# Patient Record
Sex: MALE | Race: BLACK OR AFRICAN AMERICAN | NOT HISPANIC OR LATINO | Employment: OTHER | URBAN - METROPOLITAN AREA
[De-identification: names, ages, dates, MRNs, and addresses within clinical notes are randomized per-mention and may not be internally consistent; named-entity substitution may affect disease eponyms.]

---

## 2017-08-09 ENCOUNTER — OFFICE VISIT (OUTPATIENT)
Dept: FAMILY MEDICINE CLINIC | Facility: CLINIC | Age: 62
End: 2017-08-09

## 2017-08-09 ENCOUNTER — TELEPHONE (OUTPATIENT)
Dept: FAMILY MEDICINE CLINIC | Facility: CLINIC | Age: 62
End: 2017-08-09

## 2017-08-09 VITALS
HEART RATE: 110 BPM | BODY MASS INDEX: 25.41 KG/M2 | SYSTOLIC BLOOD PRESSURE: 122 MMHG | TEMPERATURE: 98 F | OXYGEN SATURATION: 90 % | DIASTOLIC BLOOD PRESSURE: 78 MMHG | HEIGHT: 63 IN | WEIGHT: 143.4 LBS

## 2017-08-09 DIAGNOSIS — M54.50 CHRONIC BILATERAL LOW BACK PAIN WITHOUT SCIATICA: ICD-10-CM

## 2017-08-09 DIAGNOSIS — G89.29 CHRONIC NECK PAIN: ICD-10-CM

## 2017-08-09 DIAGNOSIS — N40.0 BENIGN NON-NODULAR PROSTATIC HYPERPLASIA WITHOUT LOWER URINARY TRACT SYMPTOMS: ICD-10-CM

## 2017-08-09 DIAGNOSIS — K27.9 PUD (PEPTIC ULCER DISEASE): ICD-10-CM

## 2017-08-09 DIAGNOSIS — G89.29 CHRONIC BILATERAL LOW BACK PAIN WITHOUT SCIATICA: ICD-10-CM

## 2017-08-09 DIAGNOSIS — L29.8 ITCHING DUE TO DRUG: Primary | ICD-10-CM

## 2017-08-09 DIAGNOSIS — M54.2 CHRONIC NECK PAIN: ICD-10-CM

## 2017-08-09 DIAGNOSIS — T50.905A ITCHING DUE TO DRUG: Primary | ICD-10-CM

## 2017-08-09 PROCEDURE — 96372 THER/PROPH/DIAG INJ SC/IM: CPT | Performed by: NURSE PRACTITIONER

## 2017-08-09 PROCEDURE — 99213 OFFICE O/P EST LOW 20 MIN: CPT | Performed by: NURSE PRACTITIONER

## 2017-08-09 RX ORDER — OMEPRAZOLE 40 MG/1
40 CAPSULE, DELAYED RELEASE ORAL DAILY
COMMUNITY
End: 2017-09-18 | Stop reason: SDUPTHER

## 2017-08-09 RX ORDER — LORATADINE 10 MG/1
10 TABLET ORAL EVERY MORNING
Qty: 30 TABLET | Refills: 0 | Status: SHIPPED | OUTPATIENT
Start: 2017-08-09 | End: 2018-01-01 | Stop reason: HOSPADM

## 2017-08-09 RX ORDER — ASPIRIN 81 MG/1
81 TABLET ORAL DAILY
COMMUNITY
End: 2018-09-05

## 2017-08-09 RX ORDER — SACCHAROMYCES BOULARDII 250 MG
250 CAPSULE ORAL 2 TIMES DAILY
Qty: 60 CAPSULE | Refills: 1 | Status: SHIPPED | OUTPATIENT
Start: 2017-08-09 | End: 2018-02-08 | Stop reason: HOSPADM

## 2017-08-09 RX ORDER — DIPHENHYDRAMINE HCL 25 MG
25 CAPSULE ORAL EVERY 6 HOURS PRN
Qty: 30 CAPSULE | Refills: 0 | Status: SHIPPED | OUTPATIENT
Start: 2017-08-09 | End: 2018-09-05

## 2017-08-09 RX ORDER — TAMSULOSIN HYDROCHLORIDE 0.4 MG/1
1 CAPSULE ORAL NIGHTLY
COMMUNITY
End: 2017-10-16 | Stop reason: SDUPTHER

## 2017-08-09 RX ORDER — METHYLPREDNISOLONE ACETATE 80 MG/ML
80 INJECTION, SUSPENSION INTRA-ARTICULAR; INTRALESIONAL; INTRAMUSCULAR; SOFT TISSUE ONCE
Status: COMPLETED | OUTPATIENT
Start: 2017-08-09 | End: 2017-08-09

## 2017-08-09 RX ORDER — SULFAMETHOXAZOLE AND TRIMETHOPRIM 800; 160 MG/1; MG/1
1 TABLET ORAL 2 TIMES DAILY
COMMUNITY
End: 2017-08-29

## 2017-08-09 RX ADMIN — METHYLPREDNISOLONE ACETATE 80 MG: 80 INJECTION, SUSPENSION INTRA-ARTICULAR; INTRALESIONAL; INTRAMUSCULAR; SOFT TISSUE at 14:50

## 2017-08-09 NOTE — TELEPHONE ENCOUNTER
MED IS BENADRYL, TAKE 1 CAP EVERY 6 HRS AND ALSO SAYS EVERY DAY AT BED TIME. WHICH DIRECTION IS IT?

## 2017-08-09 NOTE — PATIENT INSTRUCTIONS
Drug Allergy  A drug allergy is when the body's disease-fighting system (immune system) reacts badly to a medicine. Drug allergies range from mild to severe, and they can be life-threatening in some cases.  Some allergic reactions occur one week or more after you are exposed to a medicine (delayed reaction). A sudden (acute), severe allergic reaction that affects multiple areas of the body is called an anaphylactic reaction (anaphylaxis). Anaphylaxis can be life-threatening. All allergic reactions to a medicine require medical evaluation, even if an allergic reaction appears to be mild (minor).  CAUSES  Drug allergies happen when the immune system wrongly identifies a medicine as being harmful. When this happens, the body releases proteins (antibodies) and other compounds, such as histamine, into the bloodstream. This causes swelling in certain tissues and loss of blood pressure to important areas, such as the heart and lungs.  Almost any medicine can cause an allergic reaction. Medicines that commonly cause allergic reactions (are common allergens) include:  · Penicillin.  · Sulfa medicines (sulfonamides).  · Medicines that numb certain areas of the body (local anesthetics).  · X-ray dyes that contain iodine.  SYMPTOMS  Mild Allergic Reaction  · Nasal congestion.  · Tingling in the mouth.  · An itchy, red rash.  Severe Allergic Reaction  · Swelling of the eyes, lips, face, or tongue.  · Swelling of the back of the mouth and the throat.  · Wheezing.  · A hoarse voice.  · Itchy, red, swollen areas of skin (hives).  · Dizziness or light-headedness.  · Fainting.  · Anxiety or confusion.  · Abdominal pain.  · Difficulty breathing, speaking, or swallowing.  · Chest tightness.  · Fast or irregular heartbeats (palpitations).  · Vomiting.  · Diarrhea.  DIAGNOSIS  This condition is diagnosed from a physical exam and your history of recent exposure to one or more medicines. You may be referred for follow-up testing by a  "health care provider who specializes in allergies. This testing can confirm the diagnosis of a drug allergy and determine which medicines you are allergic to. Testing may include:  · Skin tests. These may involve:    Injecting a small amount of the possible allergen between layers of your skin (intradermal injection).    Applying patches to your skin.  · Blood tests.  · Drug challenge. For this test, a health care provider gives you a small amount of a medicine in gradual doses while watching for an allergic reaction.  If you are unsure of what caused your allergic reaction, your health care provider may ask you for:  · Information about all medicines that you take on a regular basis, including:    The name of each medicine.    How much (dosage) and how many times you take each medicine per day.    The form of each medicine, such as pill, liquid, or injection.  · The date and time of your reaction.  TREATMENT  There is no cure for allergies. However, an allergic reaction can be treated with:  · Medicines that help:    To reduce pain and swelling (NSAIDs).    To relieve itching and hives (antihistamines).    To reduce swelling (corticosteroids).  · Respiratory inhalers. These are inhaled medicines that help to widen (dilate) the airways in your lungs.  · Injections of medicine that helps to relax the muscles in your airways and tighten your blood vessels (epinephrine).  Severe allergic reactions, such as anaphylaxis, require immediate treatment in a hospital. If you have an anaphylactic reaction, you may need to be hospitalized for observation. You may be prescribed rescue medicines, such as epinephrine. Epinephrine comes in many forms, including what is commonly called an auto-injector \"pen\" (pre-filled automatic epinephrine injection device).   HOME CARE INSTRUCTIONS  If You Have a Severe Allergy  · Always keep an auto-injector pen or your anaphylaxis kit near you. These can be lifesaving if you have a severe " reaction. Use your auto-injector pen or anaphylaxis kit as told by your health care provider.  · Make sure that you, the members of your household, and your employer know:    How to use an anaphylaxis kit.    How to use an auto-injector pen to give you an epinephrine injection.  · Replace your epinephrine immediately after you use your auto-injector pen, in case you have another reaction.  · Wear a medical alert bracelet or necklace that states your drug allergy, if told by your health care provider.  General Instructions  · Avoid medicines that you are allergic to.  · Take over-the-counter and prescription medicines only as told by your health care provider.  · If you were given medicines to treat your reaction, do not drive until your health care provider approves.  · If you have hives or a rash:    Use an over-the-counter antihistamine as told by your health care provider.    Apply cold, wet cloths (cold compresses) to your skin or take baths or showers in cool water. Avoid hot water.  · If you had tests done, it is your responsibility to get your test results. Ask your health care provider when your results will be ready.  · Tell any health care providers who care for you that you have a drug allergy.  · Keep all follow-up visits as told by your health care provider. This is important.  SEEK MEDICAL CARE IF:  · You think that you are having an allergic reaction. Symptoms of an allergic reaction usually start within 30 minutes after you are exposed to a medicine.  · You have symptoms that last more than 2 days after your reaction.  · Your symptoms get worse.  · You develop new symptoms.  SEEK IMMEDIATE MEDICAL CARE IF:  · You needed to use epinephrine.    An epinephrine injection helps to manage life-threatening allergic reactions, but you still need to go to the emergency room even if epinephrine seems to work. This is important because anaphylaxis may happen again within 72 hours (rebound anaphylaxis).    If  you used epinephrine to treat anaphylaxis outside of the hospital, you need additional medical care. This may include more doses of epinephrine.  · You develop symptoms of a severe allergic reaction.  These symptoms may represent a serious problem that is an emergency. Do not wait to see if the symptoms will go away. Use your auto-injector pen or anaphylaxis kit as you have been instructed, and get medical help right away. Call your local emergency services (911 in the [place] Do not drive yourself to the hospital.     This information is not intended to replace advice given to you by your health care provider. Make sure you discuss any questions you have with your health care provider.     Document Released: 12/18/2006 Document Revised: 04/10/2017 Document Reviewed: 07/19/2016  [employer] Interactive Patient Education ©2017 [employer]

## 2017-08-09 NOTE — PROGRESS NOTES
Chief Complaint   Patient presents with   • Facial Swelling       Subjective   Sadi Stone is a 62 y.o. male    Facial Swelling   This is a new problem. The current episode started yesterday (yesterday started with swelling and itching in right hand, bilat feet, and now with swelling of lips and  genalized itching, ). The problem occurs constantly. The problem has been gradually worsening. Associated symptoms include a change in bowel habit (constipation since on antibiotics) and fatigue. Pertinent negatives include no fever, joint swelling, myalgias, numbness, rash (with itching), vomiting or weakness. Exacerbated by: took Cipro for 3 wks, then switched to Bactrim  DS for 3 wks, Stopped yesterday, and then itching developed. Treatments tried: Benadryl last night and this AM, not help. The treatment provided no relief.       The following portions of the patient's history were reviewed and updated as appropriate: allergies, current medications, past social history and problem list    No Known Allergies  Past Medical History:   Diagnosis Date   • Arthritis    • Glaucoma    • Peptic ulceration       Past Surgical History:   Procedure Laterality Date   • KIDNEY SURGERY Left     removed a spot    • KNEE SURGERY     • SHOULDER SURGERY       Social History     Social History   • Marital status:      Spouse name: N/A   • Number of children: N/A   • Years of education: N/A     Occupational History   • Not on file.     Social History Main Topics   • Smoking status: Never Smoker   • Smokeless tobacco: Never Used   • Alcohol use No   • Drug use: No   • Sexual activity: Not Currently     Other Topics Concern   • Not on file     Social History Narrative   • No narrative on file        Current Outpatient Prescriptions:   •  aspirin 81 MG EC tablet, Take 81 mg by mouth Daily., Disp: , Rfl:   •  omeprazole (priLOSEC) 40 MG capsule, Take 40 mg by mouth Daily., Disp: , Rfl:   •  Prenatal Vit-Fe Fumarate-FA (M-VIT) tablet,  Take 1 tablet by mouth Daily., Disp: , Rfl:   •  sulfamethoxazole-trimethoprim (BACTRIM DS,SEPTRA DS) 800-160 MG per tablet, Take 1 tablet by mouth 2 (Two) Times a Day., Disp: , Rfl:   •  tamsulosin (FLOMAX) 0.4 MG capsule 24 hr capsule, Take 1 capsule by mouth Every Night., Disp: , Rfl:   •  diphenhydrAMINE (BENADRYL) 25 mg capsule, Take 1 capsule by mouth Every 6 (Six) Hours As Needed for Itching (qhs for itching)., Disp: 30 capsule, Rfl: 0  •  loratadine (CLARITIN) 10 MG tablet, Take 1 tablet by mouth Every Morning., Disp: 30 tablet, Rfl: 0  •  saccharomyces boulardii (FLORASTOR) 250 MG capsule, Take 1 capsule by mouth 2 (Two) Times a Day., Disp: 60 capsule, Rfl: 1    Current Facility-Administered Medications:   •  methylPREDNISolone acetate (DEPO-medrol) injection 80 mg, 80 mg, Intramuscular, Once, BENI Hernandez     Review of Systems   Constitutional: Positive for fatigue. Negative for fever.   HENT: Positive for facial swelling.    Gastrointestinal: Positive for change in bowel habit (constipation since on antibiotics). Negative for vomiting.   Musculoskeletal: Negative for joint swelling and myalgias.   Skin: Negative for rash (with itching).   Neurological: Negative for weakness and numbness.       Objective     Vitals:    08/09/17 1303   BP: 122/78   Pulse: 110   Temp: 98 °F (36.7 °C)   SpO2: 90%       No results found for this or any previous visit.    Physical Exam   Constitutional: He is oriented to person, place, and time. He appears well-developed and well-nourished.   Cardiovascular: Normal rate, regular rhythm and normal heart sounds.    Pulmonary/Chest: Effort normal and breath sounds normal.   Neurological: He is alert and oriented to person, place, and time.   Skin: Skin is warm and dry. Rash (red raised areas on arms and legs, patch like, ) noted.   Psychiatric: He has a normal mood and affect. His behavior is normal.   Vitals reviewed.      Assessment/Plan   Problem List Items  Addressed This Visit        Digestive    PUD (peptic ulcer disease)    Relevant Medications    omeprazole (priLOSEC) 40 MG capsule       Nervous and Auditory    Itching due to drug - Primary    Relevant Medications    diphenhydrAMINE (BENADRYL) 25 mg capsule    loratadine (CLARITIN) 10 MG tablet    saccharomyces boulardii (FLORASTOR) 250 MG capsule    methylPREDNISolone acetate (DEPO-medrol) injection 80 mg (Start on 8/9/2017  2:45 PM)    Chronic neck pain       Genitourinary    Benign non-nodular prostatic hyperplasia without lower urinary tract symptoms       Other    Chronic bilateral low back pain without sciatica      stop Bactrim.     Counseling provided on diet and nutrition.

## 2017-08-29 ENCOUNTER — OFFICE VISIT (OUTPATIENT)
Dept: FAMILY MEDICINE CLINIC | Facility: CLINIC | Age: 62
End: 2017-08-29

## 2017-08-29 ENCOUNTER — LAB (OUTPATIENT)
Dept: LAB | Facility: HOSPITAL | Age: 62
End: 2017-08-29

## 2017-08-29 VITALS
HEART RATE: 60 BPM | WEIGHT: 148 LBS | HEIGHT: 63 IN | BODY MASS INDEX: 26.22 KG/M2 | TEMPERATURE: 97.2 F | OXYGEN SATURATION: 99 % | SYSTOLIC BLOOD PRESSURE: 130 MMHG | DIASTOLIC BLOOD PRESSURE: 84 MMHG

## 2017-08-29 DIAGNOSIS — Z00.00 WELLNESS EXAMINATION: Primary | ICD-10-CM

## 2017-08-29 DIAGNOSIS — Z02.4 ENCOUNTER FOR DEPARTMENT OF TRANSPORTATION (DOT) EXAMINATION FOR DRIVING LICENSE RENEWAL: ICD-10-CM

## 2017-08-29 DIAGNOSIS — N40.0 BENIGN NON-NODULAR PROSTATIC HYPERPLASIA WITHOUT LOWER URINARY TRACT SYMPTOMS: ICD-10-CM

## 2017-08-29 DIAGNOSIS — Z00.00 WELLNESS EXAMINATION: ICD-10-CM

## 2017-08-29 DIAGNOSIS — G47.33 OSA (OBSTRUCTIVE SLEEP APNEA): ICD-10-CM

## 2017-08-29 DIAGNOSIS — M54.50 CHRONIC BILATERAL LOW BACK PAIN WITHOUT SCIATICA: ICD-10-CM

## 2017-08-29 DIAGNOSIS — G89.29 CHRONIC BILATERAL LOW BACK PAIN WITHOUT SCIATICA: ICD-10-CM

## 2017-08-29 LAB
25(OH)D3 SERPL-MCNC: 21.7 NG/ML
ALBUMIN SERPL-MCNC: 3.7 G/DL (ref 3.2–4.8)
ALBUMIN/GLOB SERPL: 1 G/DL (ref 1.5–2.5)
ALP SERPL-CCNC: 103 U/L (ref 25–100)
ALT SERPL W P-5'-P-CCNC: 23 U/L (ref 7–40)
ANION GAP SERPL CALCULATED.3IONS-SCNC: 3 MMOL/L (ref 3–11)
ARTICHOKE IGE QN: 30 MG/DL (ref 0–130)
AST SERPL-CCNC: 36 U/L (ref 0–33)
BILIRUB BLD-MCNC: NEGATIVE MG/DL
BILIRUB SERPL-MCNC: 1.2 MG/DL (ref 0.3–1.2)
BUN BLD-MCNC: 20 MG/DL (ref 9–23)
BUN/CREAT SERPL: 15.4 (ref 7–25)
CALCIUM SPEC-SCNC: 9.1 MG/DL (ref 8.7–10.4)
CHLORIDE SERPL-SCNC: 107 MMOL/L (ref 99–109)
CHOLEST SERPL-MCNC: 133 MG/DL (ref 0–200)
CLARITY, POC: CLEAR
CO2 SERPL-SCNC: 31 MMOL/L (ref 20–31)
COLOR UR: YELLOW
CREAT BLD-MCNC: 1.3 MG/DL (ref 0.6–1.3)
GFR SERPL CREATININE-BSD FRML MDRD: 67 ML/MIN/1.73
GLOBULIN UR ELPH-MCNC: 3.6 GM/DL
GLUCOSE BLD-MCNC: 86 MG/DL (ref 70–100)
GLUCOSE UR STRIP-MCNC: NEGATIVE MG/DL
HCV AB SER DONR QL: NORMAL
HDLC SERPL-MCNC: 70 MG/DL (ref 40–60)
KETONES UR QL: NEGATIVE
LEUKOCYTE EST, POC: NEGATIVE
NITRITE UR-MCNC: NEGATIVE MG/ML
PH UR: 6.5 [PH] (ref 5–8)
POTASSIUM BLD-SCNC: 4.5 MMOL/L (ref 3.5–5.5)
PROT SERPL-MCNC: 7.3 G/DL (ref 5.7–8.2)
PROT UR STRIP-MCNC: ABNORMAL MG/DL
PSA SERPL-MCNC: 4.41 NG/ML (ref 0–4)
RBC # UR STRIP: NEGATIVE /UL
SODIUM BLD-SCNC: 141 MMOL/L (ref 132–146)
SP GR UR: 1.02 (ref 1–1.03)
TRIGL SERPL-MCNC: 63 MG/DL (ref 0–150)
UROBILINOGEN UR QL: NORMAL

## 2017-08-29 PROCEDURE — 80061 LIPID PANEL: CPT | Performed by: NURSE PRACTITIONER

## 2017-08-29 PROCEDURE — 82306 VITAMIN D 25 HYDROXY: CPT | Performed by: NURSE PRACTITIONER

## 2017-08-29 PROCEDURE — 84153 ASSAY OF PSA TOTAL: CPT | Performed by: NURSE PRACTITIONER

## 2017-08-29 PROCEDURE — 81003 URINALYSIS AUTO W/O SCOPE: CPT | Performed by: NURSE PRACTITIONER

## 2017-08-29 PROCEDURE — 99396 PREV VISIT EST AGE 40-64: CPT | Performed by: NURSE PRACTITIONER

## 2017-08-29 PROCEDURE — 80053 COMPREHEN METABOLIC PANEL: CPT | Performed by: NURSE PRACTITIONER

## 2017-08-29 PROCEDURE — 36415 COLL VENOUS BLD VENIPUNCTURE: CPT

## 2017-08-29 PROCEDURE — 86803 HEPATITIS C AB TEST: CPT | Performed by: NURSE PRACTITIONER

## 2017-08-29 NOTE — PATIENT INSTRUCTIONS
Back Exercises  If you have pain in your back, do these exercises 2-3 times each day or as told by your doctor. When the pain goes away, do the exercises once each day, but repeat the steps more times for each exercise (do more repetitions). If you do not have pain in your back, do these exercises once each day or as told by your doctor.  EXERCISES  Single Knee to Chest  Do these steps 3-5 times in a row for each leg:  Lie on your back on a firm bed or the floor with your legs stretched out.  Bring one knee to your chest.  Hold your knee to your chest by grabbing your knee or thigh.  Pull on your knee until you feel a gentle stretch in your lower back.  Keep doing the stretch for 10-30 seconds.  Slowly let go of your leg and straighten it.  Pelvic Tilt  Do these steps 5-10 times in a row:  Lie on your back on a firm bed or the floor with your legs stretched out.  Bend your knees so they point up to the ceiling. Your feet should be flat on the floor.  Tighten your lower belly (abdomen) muscles to press your lower back against the floor. This will make your tailbone point up to the ceiling instead of pointing down to your feet or the floor.  Stay in this position for 5-10 seconds while you gently tighten your muscles and breathe evenly.  Cat-Cow  Do these steps until your lower back bends more easily:  Get on your hands and knees on a firm surface. Keep your hands under your shoulders, and keep your knees under your hips. You may put padding under your knees.  Let your head hang down, and make your tailbone point down to the floor so your lower back is round like the back of a cat.  Stay in this position for 5 seconds.  Slowly lift your head and make your tailbone point up to the ceiling so your back hangs low (sags) like the back of a cow.  Stay in this position for 5 seconds.  Press-Ups  Do these steps 5-10 times in a row:  Lie on your belly (face-down) on the floor.  Place your hands near your head, about  shoulder-width apart.  While you keep your back relaxed and keep your hips on the floor, slowly straighten your arms to raise the top half of your body and lift your shoulders. Do not use your back muscles. To make yourself more comfortable, you may change where you place your hands.  Stay in this position for 5 seconds.  Slowly return to lying flat on the floor.  Bridges  Do these steps 10 times in a row:  Lie on your back on a firm surface.  Bend your knees so they point up to the ceiling. Your feet should be flat on the floor.  Tighten your butt muscles and lift your butt off of the floor until your waist is almost as high as your knees. If you do not feel the muscles working in your butt and the back of your thighs, slide your feet 1-2 inches farther away from your butt.  Stay in this position for 3-5 seconds.  Slowly lower your butt to the floor, and let your butt muscles relax.  If this exercise is too easy, try doing it with your arms crossed over your chest.  Belly Crunches  Do these steps 5-10 times in a row:  Lie on your back on a firm bed or the floor with your legs stretched out.  Bend your knees so they point up to the ceiling. Your feet should be flat on the floor.  Cross your arms over your chest.  Tip your chin a little bit toward your chest but do not bend your neck.  Tighten your belly muscles and slowly raise your chest just enough to lift your shoulder blades a tiny bit off of the floor.  Slowly lower your chest and your head to the floor.  Back Lifts  Do these steps 5-10 times in a row:  Lie on your belly (face-down) with your arms at your sides, and rest your forehead on the floor.  Tighten the muscles in your legs and your butt.  Slowly lift your chest off of the floor while you keep your hips on the floor. Keep the back of your head in line with the curve in your back. Look at the floor while you do this.  Stay in this position for 3-5 seconds.  Slowly lower your chest and your face to the  floor.  GET HELP IF:  Your back pain gets a lot worse when you do an exercise.  Your back pain does not lessen 2 hours after you exercise.  If you have any of these problems, stop doing the exercises. Do not do them again unless your doctor says it is okay.  GET HELP RIGHT AWAY IF:  You have sudden, very bad back pain. If this happens, stop doing the exercises. Do not do them again unless your doctor says it is okay.     This information is not intended to replace advice given to you by your health care provider. Make sure you discuss any questions you have with your health care provider.     Document Released: 01/20/2012 Document Revised: 04/10/2017 Document Reviewed: 02/11/2016  [employer] Interactive Patient Education ©2017 [employer]  Back Pain, Adult  Back pain is very common in adults. The cause of back pain is rarely dangerous and the pain often gets better over time. The cause of your back pain may not be known. Some common causes of back pain include:  · Strain of the muscles or ligaments supporting the spine.  · Wear and tear (degeneration) of the spinal disks.  · Arthritis.  · Direct injury to the back.  For many people, back pain may return. Since back pain is rarely dangerous, most people can learn to manage this condition on their own.  HOME CARE INSTRUCTIONS  Watch your back pain for any changes. The following actions may help to lessen any discomfort you are feeling:  · Remain active. It is stressful on your back to sit or  one place for long periods of time. Do not sit, drive, or  one place for more than 30 minutes at a time. Take short walks on even surfaces as soon as you are able. Try to increase the length of time you walk each day.  · Exercise regularly as directed by your health care provider. Exercise helps your back heal faster. It also helps avoid future injury by keeping your muscles strong and flexible.  · Do not stay in bed. Resting more than 1-2 days can delay your  recovery.  · Pay attention to your body when you bend and lift. The most comfortable positions are those that put less stress on your recovering back. Always use proper lifting techniques, including:    Bending your knees.    Keeping the load close to your body.    Avoiding twisting.  · Find a comfortable position to sleep. Use a firm mattress and lie on your side with your knees slightly bent. If you lie on your back, put a pillow under your knees.  · Avoid feeling anxious or stressed. Stress increases muscle tension and can worsen back pain. It is important to recognize when you are anxious or stressed and learn ways to manage it, such as with exercise.  · Take medicines only as directed by your health care provider. Over-the-counter medicines to reduce pain and inflammation are often the most helpful. Your health care provider may prescribe muscle relaxant drugs. These medicines help dull your pain so you can more quickly return to your normal activities and healthy exercise.  · Apply ice to the injured area:    Put ice in a plastic bag.    Place a towel between your skin and the bag.    Leave the ice on for 20 minutes, 2-3 times a day for the first 2-3 days. After that, ice and heat may be alternated to reduce pain and spasms.  · Maintain a healthy weight. Excess weight puts extra stress on your back and makes it difficult to maintain good posture.  SEEK MEDICAL CARE IF:  · You have pain that is not relieved with rest or medicine.  · You have increasing pain going down into the legs or buttocks.  · You have pain that does not improve in one week.  · You have night pain.  · You lose weight.  · You have a fever or chills.  SEEK IMMEDIATE MEDICAL CARE IF:   · You develop new bowel or bladder control problems.  · You have unusual weakness or numbness in your arms or legs.  · You develop nausea or vomiting.  · You develop abdominal pain.  · You feel faint.     This information is not intended to replace advice given  to you by your health care provider. Make sure you discuss any questions you have with your health care provider.     Document Released: 12/18/2006 Document Revised: 01/08/2016 Document Reviewed: 04/21/2015  [email or web address] Interactive Patient Education ©2017 [email or web address]  Insomnia  Insomnia is a sleep disorder that makes it difficult to fall asleep or to stay asleep. Insomnia can cause tiredness (fatigue), low energy, difficulty concentrating, mood swings, and poor performance at work or school.   There are three different ways to classify insomnia:   · Difficulty falling asleep.  · Difficulty staying asleep.  · Waking up too early in the morning.  Any type of insomnia can be long-term (chronic) or short-term (acute). Both are common. Short-term insomnia usually lasts for three months or less. Chronic insomnia occurs at least three times a week for longer than three months.  CAUSES   Insomnia may be caused by another condition, situation, or substance, such as:  · Anxiety.  · Certain medicines.  · Gastroesophageal reflux disease (GERD) or other gastrointestinal conditions.  · Asthma or other breathing conditions.  · Restless legs syndrome, sleep apnea, or other sleep disorders.  · Chronic pain.  · Menopause. This may include hot flashes.  · Stroke.  · Abuse of alcohol, tobacco, or illegal drugs.  · Depression.  · Caffeine.    · Neurological disorders, such as Alzheimer disease.  · An overactive thyroid (hyperthyroidism).  The cause of insomnia may not be known.  RISK FACTORS  Risk factors for insomnia include:  · Gender. Women are more commonly affected than men.  · Age. Insomnia is more common as you get older.  · Stress. This may involve your professional or personal life.  · Income. Insomnia is more common in people with lower income.  · Lack of exercise.    · Irregular work schedule or night shifts.  · Traveling between different time zones.  SIGNS AND SYMPTOMS  If you have insomnia, trouble falling asleep or trouble  staying asleep is the main symptom. This may lead to other symptoms, such as:  · Feeling fatigued.  · Feeling nervous about going to sleep.  · Not feeling rested in the morning.  · Having trouble concentrating.  · Feeling irritable, anxious, or depressed.  TREATMENT   Treatment for insomnia depends on the cause. If your insomnia is caused by an underlying condition, treatment will focus on addressing the condition. Treatment may also include:   · Medicines to help you sleep.  · Counseling or therapy.  · Lifestyle adjustments.  HOME CARE INSTRUCTIONS   · Take medicines only as directed by your health care provider.  · Keep regular sleeping and waking hours. Avoid naps.  · Keep a sleep diary to help you and your health care provider figure out what could be causing your insomnia. Include:      When you sleep.    When you wake up during the night.    How well you sleep.      How rested you feel the next day.    Any side effects of medicines you are taking.    What you eat and drink.    · Make your bedroom a comfortable place where it is easy to fall asleep:    Put up shades or special blackout curtains to block light from outside.    Use a white noise machine to block noise.    Keep the temperature cool.    · Exercise regularly as directed by your health care provider. Avoid exercising right before bedtime.  · Use relaxation techniques to manage stress. Ask your health care provider to suggest some techniques that may work well for you. These may include:    Breathing exercises.    Routines to release muscle tension.    Visualizing peaceful scenes.  · Cut back on alcohol, caffeinated beverages, and cigarettes, especially close to bedtime. These can disrupt your sleep.  · Do not overeat or eat spicy foods right before bedtime. This can lead to digestive discomfort that can make it hard for you to sleep.  · Limit screen use before bedtime. This includes:    Watching TV.    Using your smartphone, tablet, and  computer.  · Stick to a routine. This can help you fall asleep faster. Try to do a quiet activity, brush your teeth, and go to bed at the same time each night.  · Get out of bed if you are still awake after 15 minutes of trying to sleep. Keep the lights down, but try reading or doing a quiet activity. When you feel sleepy, go back to bed.  · Make sure that you drive carefully. Avoid driving if you feel very sleepy.  · Keep all follow-up appointments as directed by your health care provider. This is important.  SEEK MEDICAL CARE IF:   · You are tired throughout the day or have trouble in your daily routine due to sleepiness.  · You continue to have sleep problems or your sleep problems get worse.  SEEK IMMEDIATE MEDICAL CARE IF:   · You have serious thoughts about hurting yourself or someone else.     This information is not intended to replace advice given to you by your health care provider. Make sure you discuss any questions you have with your health care provider.     Document Released: 12/15/2001 Document Revised: 09/07/2016 Document Reviewed: 09/18/2015  [employer] Interactive Patient Education ©2017 [employer]

## 2017-08-29 NOTE — PROGRESS NOTES
Patient Care Team:  Maco Ordoñez MD as PCP - General  Maco Ordoñez MD as PCP - Family Medicine     Chief complaint: Patient is in today for a physical     Patient is a 62 y.o. male who presents for his yearly physical exam.     Pain   This is a new problem. The current episode started more than 1 month ago (1-2 months). The problem occurs intermittently (seems to hurt with weather change). Associated symptoms include arthralgias (hurts when lift mower up.) and chest pain (left ant CP, constant for 2-3 days then resolved, sharp. ). Pertinent negatives include no abdominal pain, anorexia, change in bowel habit, chills, congestion, coughing, diaphoresis, fatigue, fever, headaches, joint swelling, myalgias, nausea, neck pain, numbness, rash, sore throat, swollen glands, urinary symptoms, vertigo, visual change, vomiting or weakness. The symptoms are aggravated by exertion. He has tried NSAIDs (asa, helps some, Advil helps better, Tylenol slow to help) for the symptoms. The treatment provided moderate relief.   Insomnia   This is a chronic problem. The current episode started more than 1 year ago (I am too busy to go to sleep early, My wife goes to bed at 9 pm, PT usually goes to bed at 12 mn. gets up at 4 am. Does have episode of falling asleep watching TV and has fallen asleep at long traffic lights. ). The problem occurs constantly. The problem has been unchanged. Associated symptoms include arthralgias (hurts when lift mower up.) and chest pain (left ant CP, constant for 2-3 days then resolved, sharp. ). Pertinent negatives include no abdominal pain, anorexia, change in bowel habit, chills, congestion, coughing, diaphoresis, fatigue, fever, headaches, joint swelling, myalgias, nausea, neck pain, numbness, rash, sore throat, swollen glands, urinary symptoms, vertigo, visual change, vomiting or weakness. Nothing (seems to be able to sleep, just keeps self busy and not go to sleep until after 11 pm news.)  aggravates the symptoms. He has tried nothing for the symptoms. The treatment provided no relief.      Weight- Patient feels appetite decreased, WT usually 155-160. 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 Negative for chills, diaphoresis, fatigue and fever. Unexpected weight change: wt up 5 lbs.   HENT: Negative for congestion, ear pain, hearing loss, rhinorrhea, sinus pressure and sore throat.    Eyes: Negative for itching and visual disturbance.   Respiratory: Negative for cough, shortness of breath and wheezing.    Cardiovascular: Positive for chest pain (left ant CP, constant for 2-3 days then resolved, sharp. ). Negative for palpitations and leg swelling.   Gastrointestinal: Negative for abdominal pain, anorexia, change in bowel habit, constipation, diarrhea, nausea and vomiting.   Endocrine: Negative for cold intolerance and heat intolerance.   Genitourinary: Positive for difficulty urinating (weak stream, frequency). Negative for dysuria and hematuria.   Musculoskeletal: Positive for arthralgias (hurts when lift mower up.) and back pain. Negative for joint swelling, myalgias and neck pain.   Skin: Negative for rash and wound.   Allergic/Immunologic: Negative for environmental allergies and food allergies.   Neurological: Negative for dizziness, vertigo, weakness, numbness and headaches.   Hematological: Negative for adenopathy. Does not bruise/bleed easily.   Psychiatric/Behavioral: Positive for sleep disturbance. The patient has insomnia. The patient is not nervous/anxious.       History  Past Medical History:   Diagnosis Date   • Arthritis    • Glaucoma    • Peptic ulceration       Past Surgical History:   Procedure Laterality Date   • KIDNEY SURGERY Left     removed a spot    • KNEE SURGERY     • SHOULDER SURGERY        Allergies   Allergen Reactions   • Bactrim  [Sulfamethoxazole-Trimethoprim] Anaphylaxis      Family History   Problem Relation Age of Onset   • Cancer Mother    • Cancer Father    • Cancer Sister    • No Known Problems Brother    • No Known Problems Daughter    • No Known Problems Brother    • No Known Problems Sister      Social History     Social History   • Marital status:      Spouse name: N/A   • Number of children: N/A   • Years of education: N/A     Occupational History   • Not on file.     Social History Main Topics   • Smoking status: Never Smoker   • Smokeless tobacco: Never Used   • Alcohol use No   • Drug use: No   • Sexual activity: Not Currently     Other Topics Concern   • Not on file     Social History Narrative        Current Outpatient Prescriptions:   •  aspirin 81 MG EC tablet, Take 81 mg by mouth Daily., Disp: , Rfl:   •  loratadine (CLARITIN) 10 MG tablet, Take 1 tablet by mouth Every Morning., Disp: 30 tablet, Rfl: 0  •  Multiple Vitamins-Minerals (MULTIVITAMIN ADULT PO), Take  by mouth., Disp: , Rfl:   •  omeprazole (priLOSEC) 40 MG capsule, Take 40 mg by mouth Daily., Disp: , Rfl:   •  saccharomyces boulardii (FLORASTOR) 250 MG capsule, Take 1 capsule by mouth 2 (Two) Times a Day., Disp: 60 capsule, Rfl: 1  •  tamsulosin (FLOMAX) 0.4 MG capsule 24 hr capsule, Take 1 capsule by mouth Every Night., Disp: , Rfl:   •  diphenhydrAMINE (BENADRYL) 25 mg capsule, Take 1 capsule by mouth Every 6 (Six) Hours As Needed for Itching (qhs for itching)., Disp: 30 capsule, Rfl: 0       Immunizations   N/A   Prescribed/Refused   Date     Td/Tdap  (Booster Q 10 yrs)   [x]           Prescribed    []     Refused        []           Flu  (Yearly)   [x]        Prescribed    []     Refused        []           Pneumovax  (1 yr after Prevnar)   [x]        Prescribed    []     Refused        []           Prevnar 13  (1 yr after Pneumo)   [x]        Prescribed    []     Refused        []           Hep B     [x]        Prescribed    []     Refused         []           Zostavax  (Age 60 and older)   [x]        Prescribed    []     Refused        []           Immunization History   Administered Date(s) Administered   • Influenza Whole 10/01/2016   • Pneumococcal Polysaccharide 2015   • Tdap 10/01/2016     Health Maintenance   Topic Date Due   • INFLUENZA VACCINE  2017   • TDAP/TD VACCINES (2 - Td) 10/01/2026   • COLONOSCOPY  2026   • HEPATITIS C SCREENING  Completed   • ZOSTER VACCINE  Completed        Diabetes  [] Yes  [x] No   N/A      Date     Eye Exam     []            []   Complete         Date:  Where:       Foot Exam     []         []   Complete          Obesity Counseling     []       []   Complete     No results found for: HGBA1C, MICROALBUR    Additional Testing      Date     Colorectal Screening       [x]   N/A   []   Complete         Date:    Where:       Pap      [x]   N/A   []   Complete   Date:    Where:       Mammogram        [x]   N/A   []   Complete Date:    Where:     PSA  (Over age 50)    []   N/A   [x]   Complete   Date:    Where:     US Aorta  (For male smokers, age 65)     [x]   N/A   []   Complete   Date:    Where:     CT for Smoker  (Age 55-75, 30 pk yr)    [x]   N/A   []   Complete   Date:    Where:     Bone Density/DEXA      [x]   N/A   []   Complete   Date:    Follow-up:     Hep. C  ( [id number])      [x]   N/A   []   Complete   Date:    Where:     Results for orders placed or performed in visit on 17   POCT urinalysis dipstick, automated   Result Value Ref Range    Color Yellow Yellow, Straw, Dark Yellow, Renae    Clarity, UA Clear Clear    Glucose, UA Negative Negative, 1000 mg/dL (3+) mg/dL    Bilirubin Negative Negative    Ketones, UA Negative Negative    Specific Gravity  1.020 1.005 - 1.030    Blood, UA Negative Negative    pH, Urine 6.5 5.0 - 8.0    Protein, POC Trace (A) Negative mg/dL    Urobilinogen, UA Normal Normal    Leukocytes Negative Negative    Nitrite, UA Negative Negative            /84 (BP  "Location: Left arm, Patient Position: Sitting, Cuff Size: Large Adult)  Pulse 60  Temp 97.2 °F (36.2 °C) (Temporal Artery )   Ht 63\" (160 cm)  Wt 148 lb (67.1 kg)  SpO2 99%  BMI 26.22 kg/m2      Physical Exam   Constitutional: He is oriented to person, place, and time. He appears well-developed and well-nourished. No distress.   HENT:   Head: Normocephalic and atraumatic.   Right Ear: Hearing, tympanic membrane, external ear and ear canal normal.   Left Ear: Hearing, tympanic membrane, external ear and ear canal normal.   Nose: Nose normal. Right sinus exhibits no maxillary sinus tenderness and no frontal sinus tenderness. Left sinus exhibits no maxillary sinus tenderness and no frontal sinus tenderness.   Mouth/Throat: Oropharynx is clear and moist.   Eyes: Conjunctivae and EOM are normal. Pupils are equal, round, and reactive to light. Right eye exhibits no discharge. Left eye exhibits no discharge. No scleral icterus.   Vision 20/25 in both eyes and each eye   Neck: Normal range of motion. Neck supple. No JVD (no bruits) present. No tracheal deviation present. No thyromegaly present.   Cardiovascular: Normal rate, regular rhythm and intact distal pulses.  Exam reveals no gallop and no friction rub.    No murmur heard.  Pulmonary/Chest: Effort normal and breath sounds normal. No respiratory distress. He has no wheezes. He has no rales. He exhibits no tenderness. Right breast exhibits no inverted nipple, no mass, no nipple discharge, no skin change and no tenderness. Left breast exhibits no inverted nipple, no mass, no nipple discharge, no skin change and no tenderness. Breasts are symmetrical.   Abdominal: Soft. Normal appearance and bowel sounds are normal. He exhibits no distension and no mass. There is no hepatosplenomegaly. There is no tenderness. There is no rebound and no guarding. No hernia.   Genitourinary:   Genitourinary Comments: Tender left groin area from recent surgery, no hernia noted "   Musculoskeletal: Normal range of motion. He exhibits no edema, tenderness or deformity.   Lymphadenopathy:     He has no cervical adenopathy.   Neurological: He is alert and oriented to person, place, and time. He has normal reflexes. He displays normal reflexes.   Skin: Skin is warm and dry. No rash noted. He is not diaphoretic. No erythema. No pallor.   Psychiatric: He has a normal mood and affect. His behavior is normal. Judgment and thought content normal.   Nursing note and vitals reviewed.     Pt advised to eat a heart healthy diet and get regular aerobic exercise.Plan of care discussed with pt. They verbalized understanding and agreement.         Counseling provided on insomnia and Back pain.    Diagnoses and all orders for this visit:    Wellness examination  -     Comprehensive Metabolic Panel; Future  -     Hepatitis C Antibody; Future  -     Lipid Panel; Future  -     Vitamin D 25 Hydroxy; Future  -     PSA; Future    Benign non-nodular prostatic hyperplasia without lower urinary tract symptoms  -     PSA; Future    Chronic bilateral low back pain without sciatica    Encounter for Department of Transportation (DOT) examination for driving license renewal  -     POCT urinalysis dipstick, automated    BHAVANI (obstructive sleep apnea)  -     Ambulatory Referral to Sleep Medicine    Other orders  -     Multiple Vitamins-Minerals (MULTIVITAMIN ADULT PO); Take  by mouth.     Filed out DOT forms. Patient certified for 2 years.    Back pain- do exercises given.    Patient was encouraged to keep me informed of any acute changes, lack of improvement, or any new concerning symptoms.Pt advised to eat a heart healthy diet and get regular aerobic exercise.Plan of care discussed with pt. They verbalized understanding and agreement.     Jh Danielle, APRN   8/29/2017   11:59 AM

## 2017-09-01 ENCOUNTER — TELEPHONE (OUTPATIENT)
Dept: FAMILY MEDICINE CLINIC | Facility: CLINIC | Age: 62
End: 2017-09-01

## 2017-09-01 NOTE — TELEPHONE ENCOUNTER
----- Message from BENI Hernandez sent at 9/1/2017  3:04 PM EDT -----  PSA is barely elevated. Lets repeat in 3 months. Some of the LFTs are elevated slightly, will repeat these also.        Called and left patient a message to call the office back in regards to his recent labs. Also tried calling the patient's cell phone but got disconnected because of phone issues.

## 2017-09-05 ENCOUNTER — TELEPHONE (OUTPATIENT)
Dept: FAMILY MEDICINE CLINIC | Facility: CLINIC | Age: 62
End: 2017-09-05

## 2017-09-05 NOTE — TELEPHONE ENCOUNTER
----- Message from BENI Hernandez sent at 9/1/2017  3:04 PM EDT -----  PSA is barely elevated. Lets repeat in 3 months. Some of the LFTs are elevated slightly, will repeat these also.        Called and spoke with patient and informed him that his PSA is barely elevated and some of his LFTs were slightly elevated, told him that we need to repeat his labs in 3 months. Per Jh. Patient understood.

## 2017-09-18 RX ORDER — OMEPRAZOLE 40 MG/1
40 CAPSULE, DELAYED RELEASE ORAL DAILY
Qty: 30 CAPSULE | Refills: 2 | Status: SHIPPED | OUTPATIENT
Start: 2017-09-18 | End: 2018-01-04 | Stop reason: SDUPTHER

## 2017-10-06 ENCOUNTER — OFFICE VISIT (OUTPATIENT)
Dept: FAMILY MEDICINE CLINIC | Facility: CLINIC | Age: 62
End: 2017-10-06

## 2017-10-06 ENCOUNTER — LAB (OUTPATIENT)
Dept: LAB | Facility: HOSPITAL | Age: 62
End: 2017-10-06

## 2017-10-06 ENCOUNTER — HOSPITAL ENCOUNTER (OUTPATIENT)
Dept: CARDIOLOGY | Facility: HOSPITAL | Age: 62
Discharge: HOME | End: 2017-10-06
Admitting: NURSE PRACTITIONER

## 2017-10-06 VITALS
SYSTOLIC BLOOD PRESSURE: 140 MMHG | DIASTOLIC BLOOD PRESSURE: 80 MMHG | BODY MASS INDEX: 25.16 KG/M2 | HEIGHT: 63 IN | HEART RATE: 68 BPM | TEMPERATURE: 97.2 F | OXYGEN SATURATION: 97 % | WEIGHT: 142 LBS

## 2017-10-06 DIAGNOSIS — R53.83 FATIGUE, UNSPECIFIED TYPE: ICD-10-CM

## 2017-10-06 DIAGNOSIS — R79.89 ELEVATED LFTS: ICD-10-CM

## 2017-10-06 DIAGNOSIS — M79.89 LEFT LEG SWELLING: ICD-10-CM

## 2017-10-06 DIAGNOSIS — R97.20 ELEVATED PSA MEASUREMENT: Primary | ICD-10-CM

## 2017-10-06 DIAGNOSIS — R97.20 ELEVATED PSA MEASUREMENT: ICD-10-CM

## 2017-10-06 LAB
ALBUMIN SERPL-MCNC: 3.8 G/DL (ref 3.2–4.8)
ALBUMIN/GLOB SERPL: 1 G/DL (ref 1.5–2.5)
ALP SERPL-CCNC: 98 U/L (ref 25–100)
ALT SERPL W P-5'-P-CCNC: 16 U/L (ref 7–40)
ANION GAP SERPL CALCULATED.3IONS-SCNC: 1 MMOL/L (ref 3–11)
AST SERPL-CCNC: 35 U/L (ref 0–33)
BH CV LOWER VASCULAR LEFT COMMON FEMORAL AUGMENT: NORMAL
BH CV LOWER VASCULAR LEFT COMMON FEMORAL COMPETENT: NORMAL
BH CV LOWER VASCULAR LEFT COMMON FEMORAL COMPRESS: NORMAL
BH CV LOWER VASCULAR LEFT COMMON FEMORAL PHASIC: NORMAL
BH CV LOWER VASCULAR LEFT COMMON FEMORAL SPONT: NORMAL
BH CV LOWER VASCULAR LEFT DISTAL FEMORAL COMPRESS: NORMAL
BH CV LOWER VASCULAR LEFT GASTRONEMIUS COMPRESS: NORMAL
BH CV LOWER VASCULAR LEFT GREATER SAPH AK COMPRESS: NORMAL
BH CV LOWER VASCULAR LEFT GREATER SAPH BK COMPRESS: NORMAL
BH CV LOWER VASCULAR LEFT MID FEMORAL AUGMENT: NORMAL
BH CV LOWER VASCULAR LEFT MID FEMORAL COMPETENT: NORMAL
BH CV LOWER VASCULAR LEFT MID FEMORAL COMPRESS: NORMAL
BH CV LOWER VASCULAR LEFT MID FEMORAL PHASIC: NORMAL
BH CV LOWER VASCULAR LEFT MID FEMORAL SPONT: NORMAL
BH CV LOWER VASCULAR LEFT PERONEAL COMPRESS: NORMAL
BH CV LOWER VASCULAR LEFT POPLITEAL AUGMENT: NORMAL
BH CV LOWER VASCULAR LEFT POPLITEAL COMPETENT: NORMAL
BH CV LOWER VASCULAR LEFT POPLITEAL COMPRESS: NORMAL
BH CV LOWER VASCULAR LEFT POPLITEAL PHASIC: NORMAL
BH CV LOWER VASCULAR LEFT POPLITEAL SPONT: NORMAL
BH CV LOWER VASCULAR LEFT POSTERIOR TIBIAL COMPRESS: NORMAL
BH CV LOWER VASCULAR LEFT PROXIMAL FEMORAL COMPRESS: NORMAL
BH CV LOWER VASCULAR LEFT SAPHENOFEMORAL JUNCTION AUGMENT: NORMAL
BH CV LOWER VASCULAR LEFT SAPHENOFEMORAL JUNCTION COMPETENT: NORMAL
BH CV LOWER VASCULAR LEFT SAPHENOFEMORAL JUNCTION COMPRESS: NORMAL
BH CV LOWER VASCULAR LEFT SAPHENOFEMORAL JUNCTION PHASIC: NORMAL
BH CV LOWER VASCULAR LEFT SAPHENOFEMORAL JUNCTION SPONT: NORMAL
BH CV LOWER VASCULAR RIGHT COMMON FEMORAL AUGMENT: NORMAL
BH CV LOWER VASCULAR RIGHT COMMON FEMORAL COMPETENT: NORMAL
BH CV LOWER VASCULAR RIGHT COMMON FEMORAL COMPRESS: NORMAL
BH CV LOWER VASCULAR RIGHT COMMON FEMORAL PHASIC: NORMAL
BH CV LOWER VASCULAR RIGHT COMMON FEMORAL SPONT: NORMAL
BILIRUB SERPL-MCNC: 0.8 MG/DL (ref 0.3–1.2)
BUN BLD-MCNC: 23 MG/DL (ref 9–23)
BUN/CREAT SERPL: 15.3 (ref 7–25)
CALCIUM SPEC-SCNC: 9.3 MG/DL (ref 8.7–10.4)
CHLORIDE SERPL-SCNC: 104 MMOL/L (ref 99–109)
CO2 SERPL-SCNC: 33 MMOL/L (ref 20–31)
CREAT BLD-MCNC: 1.5 MG/DL (ref 0.6–1.3)
GFR SERPL CREATININE-BSD FRML MDRD: 57 ML/MIN/1.73
GLOBULIN UR ELPH-MCNC: 4 GM/DL
GLUCOSE BLD-MCNC: 86 MG/DL (ref 70–100)
POTASSIUM BLD-SCNC: 4.9 MMOL/L (ref 3.5–5.5)
PROT SERPL-MCNC: 7.8 G/DL (ref 5.7–8.2)
PSA SERPL-MCNC: 4.85 NG/ML (ref 0–4)
SODIUM BLD-SCNC: 138 MMOL/L (ref 132–146)
TSH SERPL DL<=0.05 MIU/L-ACNC: 2.15 MIU/ML (ref 0.35–5.35)

## 2017-10-06 PROCEDURE — 36415 COLL VENOUS BLD VENIPUNCTURE: CPT

## 2017-10-06 PROCEDURE — 84153 ASSAY OF PSA TOTAL: CPT | Performed by: NURSE PRACTITIONER

## 2017-10-06 PROCEDURE — 80053 COMPREHEN METABOLIC PANEL: CPT | Performed by: NURSE PRACTITIONER

## 2017-10-06 PROCEDURE — 93971 EXTREMITY STUDY: CPT

## 2017-10-06 PROCEDURE — 99214 OFFICE O/P EST MOD 30 MIN: CPT | Performed by: NURSE PRACTITIONER

## 2017-10-06 PROCEDURE — 84443 ASSAY THYROID STIM HORMONE: CPT | Performed by: NURSE PRACTITIONER

## 2017-10-06 PROCEDURE — 93971 EXTREMITY STUDY: CPT | Performed by: INTERNAL MEDICINE

## 2017-10-06 NOTE — PATIENT INSTRUCTIONS
Fatigue  Fatigue is feeling tired all of the time, a lack of energy, or a lack of motivation. Occasional or mild fatigue is often a normal response to activity or life in general. However, long-lasting (chronic) or extreme fatigue may indicate an underlying medical condition.  HOME CARE INSTRUCTIONS   Watch your fatigue for any changes. The following actions may help to lessen any discomfort you are feeling:  · Talk to your health care provider about how much sleep you need each night. Try to get the required amount every night.  · Take medicines only as directed by your health care provider.  · Eat a healthy and nutritious diet. Ask your health care provider if you need help changing your diet.  · Drink enough fluid to keep your urine clear or pale yellow.  · Practice ways of relaxing, such as yoga, meditation, massage therapy, or acupuncture.  · Exercise regularly.    · Change situations that cause you stress. Try to keep your work and personal routine reasonable.  · Do not abuse illegal drugs.  · Limit alcohol intake to no more than 1 drink per day for nonpregnant women and 2 drinks per day for men. One drink equals 12 ounces of beer, 5 ounces of wine, or 1½ ounces of hard liquor.  · Take a multivitamin, if directed by your health care provider.  SEEK MEDICAL CARE IF:   · Your fatigue does not get better.  · You have a fever.    · You have unintentional weight loss or gain.  · You have headaches.    · You have difficulty:      Falling asleep.    Sleeping throughout the night.  · You feel angry, guilty, anxious, or sad.     · You are unable to have a bowel movement (constipation).    · You skin is dry.     · Your legs or another part of your body is swollen.    SEEK IMMEDIATE MEDICAL CARE IF:   · You feel confused.    · Your vision is blurry.  · You feel faint or pass out.    · You have a severe headache.    · You have severe abdominal, pelvic, or back pain.    · You have chest pain, shortness of breath, or an  irregular or fast heartbeat.    · You are unable to urinate or you urinate less than normal.    · You develop abnormal bleeding, such as bleeding from the rectum, vagina, nose, lungs, or nipples.  · You vomit blood.     · You have thoughts about harming yourself or committing suicide.    · You are worried that you might harm someone else.       This information is not intended to replace advice given to you by your health care provider. Make sure you discuss any questions you have with your health care provider.     Document Released: 10/14/2008 Document Revised: 04/10/2017 Document Reviewed: 04/21/2015  [employer] Interactive Patient Education ©2017 [employer]

## 2017-10-09 ENCOUNTER — TELEPHONE (OUTPATIENT)
Dept: FAMILY MEDICINE CLINIC | Facility: CLINIC | Age: 62
End: 2017-10-09

## 2017-10-09 DIAGNOSIS — R97.20 ELEVATED PSA: Primary | ICD-10-CM

## 2017-10-09 NOTE — TELEPHONE ENCOUNTER
Spoke with patient and let him know that is PSA is more elevated than last time, explained to him that his labs showed that he has stage 3 CKD and that the US of lower ext showed incidental enlarged lymph system, R/T elevated PSA. Told him that we would like to refer him to Urology. Per Jh. Patient understood. Patient then stated that he has an appointment with Dr. Lam this coming Wednesday.

## 2017-10-16 ENCOUNTER — OFFICE VISIT (OUTPATIENT)
Dept: FAMILY MEDICINE CLINIC | Facility: CLINIC | Age: 62
End: 2017-10-16

## 2017-10-16 VITALS
TEMPERATURE: 97 F | HEIGHT: 63 IN | BODY MASS INDEX: 25.69 KG/M2 | WEIGHT: 145 LBS | HEART RATE: 91 BPM | OXYGEN SATURATION: 99 % | SYSTOLIC BLOOD PRESSURE: 120 MMHG | DIASTOLIC BLOOD PRESSURE: 84 MMHG

## 2017-10-16 DIAGNOSIS — M79.89 LEFT LEG SWELLING: ICD-10-CM

## 2017-10-16 DIAGNOSIS — R97.20 ELEVATED PSA MEASUREMENT: Primary | ICD-10-CM

## 2017-10-16 PROCEDURE — 99214 OFFICE O/P EST MOD 30 MIN: CPT | Performed by: NURSE PRACTITIONER

## 2017-10-16 RX ORDER — TAMSULOSIN HYDROCHLORIDE 0.4 MG/1
1 CAPSULE ORAL NIGHTLY
Qty: 30 CAPSULE | Refills: 5 | Status: SHIPPED | OUTPATIENT
Start: 2017-10-16 | End: 2018-03-14 | Stop reason: SDUPTHER

## 2017-10-16 NOTE — PATIENT INSTRUCTIONS
Prostate-Specific Antigen Test  WHY AM I HAVING THIS TEST?  The prostate-specific antigen (PSA) test is performed to determine how much PSA you have in your blood. PSA is a type of protein that is normally present in the prostate gland. Certain conditions can cause PSA blood levels to increase, such as:  · Infection in the prostate (prostatitis).  · Enlargement of the prostate (hypertrophy).  · Prostate cancer.  Because PSA levels increase greatly from prostate cancer, this test can be used to confirm a diagnosis of prostate cancer. It may also be used to monitor treatment for prostate cancer and to watch for a return of prostate cancer after treatment has finished.   This test has a very high false-positive rate. Therefore, routine PSA screening for all men is no longer recommended. A false-positive result is incorrect because it indicates a condition or finding is present when it is not.   WHAT KIND OF SAMPLE IS TAKEN?   A blood sample is required for this test. It is usually collected by inserting a needle into a vein or by sticking a finger with a small needle.  HOW DO I PREPARE FOR THE TEST?  There is no preparation required for this test. However, there are factors that can affect the results of a PSA test. To get the most accurate results:  · Avoid having a rectal exam within several hours before having your blood drawn for this test.    · Avoid having any procedures performed on the prostate gland within 6 weeks of having this test.    · Avoid ejaculating within 24 hours of having this test.    · Tell your health care provider if you had a recent urinary tract infection (UTI).  · Tell your health care provider if you are taking medicines to assist with hair growth, such as finasteride.  · Tell your health care provider if you have been exposed to a medicine called diethylstilbestrol.  Let your health care provider know if any of these factors apply to you. You may be asked to reschedule the test.  WHAT ARE  THE REFERENCE RANGES?  Reference ranges are established after testing a large group of people. Reference ranges may vary among different people, labs, and hospitals. It is your responsibility to obtain your test results. Ask the lab or department performing the test when and how you will get your results.  · Low: 0-2.5 ng/mL.  · Slightly to moderately elevated: 2.6-10.0 ng/mL.  · Moderately elevated: 10.0-19.9 ng/mL.  · Significantly elevated: 20 ng/mL or greater.  WHAT DO THE RESULTS MEAN?  PSA test results greater than 4 ng/mL are found in the majority of men with prostate cancer. If your test result is above this level, this can indicate an increased risk for prostate cancer. Increased PSA levels can also indicate other health conditions.  Talk with your health care provider to discuss your results, treatment options, and if necessary, the need for more tests. Talk with your health care provider if you have any questions about your results.     This information is not intended to replace advice given to you by your health care provider. Make sure you discuss any questions you have with your health care provider.     Document Released: 01/20/2006 Document Revised: 09/07/2016 Document Reviewed: 05/13/2015  [employer] Interactive Patient Education ©2017 [employer]

## 2017-10-16 NOTE — PROGRESS NOTES
Chief Complaint   Patient presents with   • Elevated PSA   • Leg Swelling     Left leg swelling X 3 weeks       Subjective   Sadi Stone is a 62 y.o. male    History of Present Illness  Elevated PSA- has seen Urology Dr Lam, They recommend Prostate BX. Nocturia worse since out of Flomax.     Left leg swelling- Still with left leg swelling, some pain with walking. Pt has been lifting a lot of wt. Worse with lifting.     Allergies   Allergen Reactions   • Bactrim [Sulfamethoxazole-Trimethoprim] Anaphylaxis     Past Medical History:   Diagnosis Date   • Arthritis    • Glaucoma    • Peptic ulceration       Past Surgical History:   Procedure Laterality Date   • KIDNEY SURGERY Left     removed a spot    • KNEE SURGERY     • SHOULDER SURGERY       Social History     Social History   • Marital status:      Spouse name: N/A   • Number of children: N/A   • Years of education: N/A     Occupational History   • Not on file.     Social History Main Topics   • Smoking status: Never Smoker   • Smokeless tobacco: Never Used   • Alcohol use No   • Drug use: No   • Sexual activity: Not Currently     Other Topics Concern   • Not on file     Social History Narrative        Current Outpatient Prescriptions:   •  aspirin 81 MG EC tablet, Take 81 mg by mouth Daily., Disp: , Rfl:   •  diphenhydrAMINE (BENADRYL) 25 mg capsule, Take 1 capsule by mouth Every 6 (Six) Hours As Needed for Itching (qhs for itching)., Disp: 30 capsule, Rfl: 0  •  loratadine (CLARITIN) 10 MG tablet, Take 1 tablet by mouth Every Morning., Disp: 30 tablet, Rfl: 0  •  Multiple Vitamins-Minerals (MULTIVITAMIN ADULT PO), Take  by mouth., Disp: , Rfl:   •  omeprazole (priLOSEC) 40 MG capsule, Take 1 capsule by mouth Daily., Disp: 30 capsule, Rfl: 2  •  saccharomyces boulardii (FLORASTOR) 250 MG capsule, Take 1 capsule by mouth 2 (Two) Times a Day., Disp: 60 capsule, Rfl: 1  •  tamsulosin (FLOMAX) 0.4 MG capsule 24 hr capsule, Take 1 capsule by mouth Every  Night., Disp: 30 capsule, Rfl: 5     Review of Systems   Constitutional: Negative for fatigue and unexpected weight change.   Respiratory: Negative for cough, chest tightness and shortness of breath.    Cardiovascular: Positive for leg swelling (left). Negative for chest pain and palpitations.   Gastrointestinal: Negative for nausea.   Genitourinary: Positive for difficulty urinating (nocturia). Negative for dysuria.   Musculoskeletal: Negative for arthralgias and back pain.   Skin: Negative for color change and rash.   Neurological: Negative for dizziness, syncope, weakness and headaches.   Psychiatric/Behavioral: Negative for sleep disturbance. The patient is not nervous/anxious.        Objective     Vitals:    10/16/17 1506   BP: 120/84   Pulse: 91   Temp: 97 °F (36.1 °C)   SpO2: 99%       Results for orders placed or performed in visit on 10/06/17   Comprehensive Metabolic Panel   Result Value Ref Range    Glucose 86 70 - 100 mg/dL    BUN 23 9 - 23 mg/dL    Creatinine 1.50 (H) 0.60 - 1.30 mg/dL    Sodium 138 132 - 146 mmol/L    Potassium 4.9 3.5 - 5.5 mmol/L    Chloride 104 99 - 109 mmol/L    CO2 33.0 (H) 20.0 - 31.0 mmol/L    Calcium 9.3 8.7 - 10.4 mg/dL    Total Protein 7.8 5.7 - 8.2 g/dL    Albumin 3.80 3.20 - 4.80 g/dL    ALT (SGPT) 16 7 - 40 U/L    AST (SGOT) 35 (H) 0 - 33 U/L    Alkaline Phosphatase 98 25 - 100 U/L    Total Bilirubin 0.8 0.3 - 1.2 mg/dL    eGFR  African Amer 57 (L) >60 mL/min/1.73    Globulin 4.0 gm/dL    A/G Ratio 1.0 (L) 1.5 - 2.5 g/dL    BUN/Creatinine Ratio 15.3 7.0 - 25.0    Anion Gap 1.0 (L) 3.0 - 11.0 mmol/L   PSA   Result Value Ref Range    PSA 4.850 (H) 0.000 - 4.000 ng/mL   TSH   Result Value Ref Range    TSH 2.150 0.350 - 5.350 mIU/mL       Physical Exam   Constitutional: He is oriented to person, place, and time. He appears well-developed and well-nourished.   Cardiovascular: Normal rate, regular rhythm and normal heart sounds.    Pulmonary/Chest: Effort normal and breath  sounds normal.   Musculoskeletal: Normal range of motion. He exhibits edema (left leg upper, some).   Neurological: He is alert and oriented to person, place, and time.   Skin: Skin is warm and dry.   Psychiatric: He has a normal mood and affect. His behavior is normal.   Vitals reviewed.      Assessment/Plan   Problem List Items Addressed This Visit        Other    Elevated PSA measurement - Primary    Relevant Medications    tamsulosin (FLOMAX) 0.4 MG capsule 24 hr capsule    Other Relevant Orders    MRI Prostate Without Contrast    Ambulatory Referral to Urology    Left leg swelling        Discussed increased PSA and further options. Pt wants 2nd opinion on prostate Bx and MRI of prostate. Will order.     Leg swelling- DO NOT LIFT ANYTHING MORE THAN 15 LBS. Keep legs elevated.     Patient was encouraged to keep me informed of any acute changes, lack of improvement, or any new concerning symptoms.Plan of care discussed with pt. They verbalized understanding and agreement.     Counseling provided on Elevated PSA.    Jh Danielle, APRN   10/16/2017   4:47 PM

## 2017-10-17 DIAGNOSIS — R97.20 ELEVATED PSA MEASUREMENT: ICD-10-CM

## 2017-10-17 DIAGNOSIS — Z00.00 WELLNESS EXAMINATION: Primary | ICD-10-CM

## 2017-10-18 DIAGNOSIS — R97.20 ELEVATED PSA MEASUREMENT: Primary | ICD-10-CM

## 2017-10-19 DIAGNOSIS — R97.20 ELEVATED PSA MEASUREMENT: Primary | ICD-10-CM

## 2017-10-26 ENCOUNTER — OFFICE VISIT (OUTPATIENT)
Dept: FAMILY MEDICINE CLINIC | Facility: CLINIC | Age: 62
End: 2017-10-26

## 2017-10-26 VITALS
DIASTOLIC BLOOD PRESSURE: 80 MMHG | TEMPERATURE: 97.5 F | HEIGHT: 63 IN | OXYGEN SATURATION: 99 % | BODY MASS INDEX: 25.76 KG/M2 | SYSTOLIC BLOOD PRESSURE: 124 MMHG | HEART RATE: 68 BPM | WEIGHT: 145.4 LBS

## 2017-10-26 DIAGNOSIS — M79.89 LEFT LEG SWELLING: Primary | ICD-10-CM

## 2017-10-26 DIAGNOSIS — R10.32 LEFT LOWER QUADRANT ABDOMINAL PAIN OF UNKNOWN ETIOLOGY: ICD-10-CM

## 2017-10-26 DIAGNOSIS — R35.1 BENIGN PROSTATIC HYPERPLASIA WITH NOCTURIA: ICD-10-CM

## 2017-10-26 DIAGNOSIS — N40.1 BENIGN PROSTATIC HYPERPLASIA WITH NOCTURIA: ICD-10-CM

## 2017-10-26 PROCEDURE — 99214 OFFICE O/P EST MOD 30 MIN: CPT | Performed by: NURSE PRACTITIONER

## 2017-10-26 RX ORDER — DUTASTERIDE 0.5 MG/1
0.5 CAPSULE, LIQUID FILLED ORAL DAILY
Qty: 30 CAPSULE | Refills: 5 | Status: SHIPPED | OUTPATIENT
Start: 2017-10-26 | End: 2018-03-14 | Stop reason: SDUPTHER

## 2017-10-26 RX ORDER — BRIMONIDINE TARTRATE 0.1 %
DROPS OPHTHALMIC (EYE)
COMMUNITY
Start: 2017-10-21

## 2017-10-26 NOTE — PROGRESS NOTES
Chief Complaint   Patient presents with   • Leg Swelling     Left leg swelling and hurting       Subjective   Sadi Stone is a 62 y.o. male    Leg Swelling   This is a chronic problem. Episode onset: 5-6 wks. The problem occurs constantly (always swollen, but worse when up on leg alot.  Goes down some at night, never all the way). The problem has been gradually worsening. Associated symptoms include abdominal pain (left lower around to back-ache-come and go, no change with BM, ), fatigue (some), joint swelling, urinary symptoms (decreased urine) and weakness (occasionally). Pertinent negatives include no change in bowel habit, chills, coughing, diaphoresis, fever, headaches, nausea, rash, sore throat or vomiting. Associated symptoms comments: With decreased appetite. The symptoms are aggravated by standing, walking and twisting. He has tried nothing for the symptoms. The treatment provided mild (withm rest) relief.   Pt has mild elevated PSA- saw urology, recommended biopsy, PT does not want at this time. Had US left leg and showed no DVT-10/6/17- reviewed.       Allergies   Allergen Reactions   • Bactrim [Sulfamethoxazole-Trimethoprim] Anaphylaxis     Past Medical History:   Diagnosis Date   • Arthritis    • Glaucoma    • Peptic ulceration       Past Surgical History:   Procedure Laterality Date   • KIDNEY SURGERY Left     removed a spot    • KNEE SURGERY     • SHOULDER SURGERY       Social History     Social History   • Marital status:      Spouse name: N/A   • Number of children: N/A   • Years of education: N/A     Occupational History   • Not on file.     Social History Main Topics   • Smoking status: Never Smoker   • Smokeless tobacco: Never Used   • Alcohol use No   • Drug use: No   • Sexual activity: Not Currently     Other Topics Concern   • Not on file     Social History Narrative        Current Outpatient Prescriptions:   •  ALPHAGAN P 0.1 % solution ophthalmic solution, , Disp: , Rfl:   •   aspirin 81 MG EC tablet, Take 81 mg by mouth Daily., Disp: , Rfl:   •  diphenhydrAMINE (BENADRYL) 25 mg capsule, Take 1 capsule by mouth Every 6 (Six) Hours As Needed for Itching (qhs for itching)., Disp: 30 capsule, Rfl: 0  •  loratadine (CLARITIN) 10 MG tablet, Take 1 tablet by mouth Every Morning., Disp: 30 tablet, Rfl: 0  •  Multiple Vitamins-Minerals (MULTIVITAMIN ADULT PO), Take  by mouth., Disp: , Rfl:   •  omeprazole (priLOSEC) 40 MG capsule, Take 1 capsule by mouth Daily., Disp: 30 capsule, Rfl: 2  •  saccharomyces boulardii (FLORASTOR) 250 MG capsule, Take 1 capsule by mouth 2 (Two) Times a Day., Disp: 60 capsule, Rfl: 1  •  tamsulosin (FLOMAX) 0.4 MG capsule 24 hr capsule, Take 1 capsule by mouth Every Night., Disp: 30 capsule, Rfl: 5  •  dutasteride (AVODART) 0.5 MG capsule, Take 1 capsule by mouth Daily., Disp: 30 capsule, Rfl: 5     Review of Systems   Constitutional: Positive for fatigue (some). Negative for chills, diaphoresis and fever.   HENT: Negative for sore throat.    Respiratory: Negative for cough.    Gastrointestinal: Positive for abdominal pain (left lower around to back-ache-come and go, no change with BM, ). Negative for change in bowel habit, constipation, diarrhea, nausea and vomiting.   Genitourinary: Positive for decreased urine volume (some difficulty getting stream going at last.) and difficulty urinating (Nocturia 3-4 per night). Negative for dysuria.   Musculoskeletal: Positive for back pain and joint swelling.   Skin: Negative for rash.   Neurological: Positive for weakness (occasionally). Negative for headaches.   Psychiatric/Behavioral: Negative for sleep disturbance. The patient is not nervous/anxious.        Objective     Vitals:    10/26/17 0927   BP: 124/80   Pulse: 68   Temp: 97.5 °F (36.4 °C)   SpO2: 99%       Results for orders placed or performed in visit on 10/06/17   Comprehensive Metabolic Panel   Result Value Ref Range    Glucose 86 70 - 100 mg/dL    BUN 23 9 - 23  mg/dL    Creatinine 1.50 (H) 0.60 - 1.30 mg/dL    Sodium 138 132 - 146 mmol/L    Potassium 4.9 3.5 - 5.5 mmol/L    Chloride 104 99 - 109 mmol/L    CO2 33.0 (H) 20.0 - 31.0 mmol/L    Calcium 9.3 8.7 - 10.4 mg/dL    Total Protein 7.8 5.7 - 8.2 g/dL    Albumin 3.80 3.20 - 4.80 g/dL    ALT (SGPT) 16 7 - 40 U/L    AST (SGOT) 35 (H) 0 - 33 U/L    Alkaline Phosphatase 98 25 - 100 U/L    Total Bilirubin 0.8 0.3 - 1.2 mg/dL    eGFR  African Amer 57 (L) >60 mL/min/1.73    Globulin 4.0 gm/dL    A/G Ratio 1.0 (L) 1.5 - 2.5 g/dL    BUN/Creatinine Ratio 15.3 7.0 - 25.0    Anion Gap 1.0 (L) 3.0 - 11.0 mmol/L   PSA   Result Value Ref Range    PSA 4.850 (H) 0.000 - 4.000 ng/mL   TSH   Result Value Ref Range    TSH 2.150 0.350 - 5.350 mIU/mL       Physical Exam   Constitutional: He is oriented to person, place, and time. He appears well-developed and well-nourished.   Eyes: Conjunctivae are normal.   Cardiovascular: Normal rate, regular rhythm and normal heart sounds.    Pulmonary/Chest: Effort normal and breath sounds normal.   Abdominal: Soft. There is tenderness (left lower quad, into hip, and left low back).   Musculoskeletal: Normal range of motion.   Left thigh 20 inches, left thigh 17.5 inches   Neurological: He is alert and oriented to person, place, and time.   Skin: Skin is warm and dry.   Psychiatric: He has a normal mood and affect. His behavior is normal.   Vitals reviewed.      Assessment/Plan   Problem List Items Addressed This Visit        Genitourinary    Benign prostatic hyperplasia with nocturia    Relevant Medications    dutasteride (AVODART) 0.5 MG capsule       Other    Left leg swelling - Primary    Relevant Orders    MRI Hip Left Without Contrast    MRI Femur Left Without Contrast    MRI Pelvis Without Contrast      Other Visit Diagnoses     Left lower quadrant abdominal pain of unknown etiology        Relevant Orders    MRI Hip Left Without Contrast    MRI Femur Left Without Contrast    MRI Pelvis Without  Contrast      Will get MRIs if insurance will cover, if not, will send to ER or oncology, very concerned about possible cancer.  Patient was encouraged to keep me informed of any acute changes, lack of improvement, or any new concerning symptoms.Plan of care discussed with pt. They verbalized understanding and agreement.     Counseling provided on Prostate Cancer.    Jh Danielle, APRN   10/26/2017   10:37 AM

## 2017-10-26 NOTE — PATIENT INSTRUCTIONS
Prostate Cancer   Prostate cancer is the abnormal growth of cells in your prostate gland. Your prostate gland is involved in the production of semen. It is located below your bladder and in front of your rectum. A normal prostate gland is the size of a walnut and surrounds the tube that carries urine from the bladder (urethra).   RISK FACTORS  · Age older than 65 years.  ·  race.  · Obesity.  · Family history of prostate cancer.  · Family history of breast cancer.  SIGNS AND SYMPTOMS   · Frequent urination.  · Weak or interrupted flow of urine.  · Difficulty starting or stopping urination.  · Inability to urinate.  · Painful or burning urination.  · Painful ejaculation.  · Blood in urine or semen.  · Persistent pain or discomfort in the lower back, lower abdomen, hips, or upper thighs.  · Difficulty getting an erection.  · Difficulty emptying your bladder completely.  DIAGNOSIS   Prostate cancer can be diagnosed by a digital rectal exam, prostate-specific antigen (PSA) blood test, transrectal ultrasonography, and then a biopsy to test a tissue sample. Usually 8-12 samples are taken. The tissue is sent to a specialist who looks at tissues and cells (pathologist).  If cancer is diagnosed, the next step is to stage the cancer. This means it is put in a category based on how far the cancer has spread. This is important for helping your health care providers plan appropriate treatment.  The different stages of prostate cancer are as follows:  · Stage I. Cancer is found in the prostate only. It cannot be felt during a digital rectal exam and is not visible by imaging. It is usually found accidentally, such as during surgery for other prostate problems.  · Stage II. Cancer is more advanced than in stage I but has not spread outside the prostate.  · Stage III. Cancer has spread beyond the outer layer of the prostate to nearby tissues. Cancer may be found in the seminal vesicles.  · Stage IV. Cancer has  spread to lymph nodes or to other parts of the body. The cancer may have spread to the bladder, rectum, bones, liver, or lungs. Prostate cancer often spreads to the bones.  Imaging scans, such as a bone scan, CT, PET, or MRI, are done to help in the staging process.  TREATMENT   Treatments such as surgery, medicines, and radiation may be recommended based on the stage of the cancer and other factors. Once cancer of the prostate has been diagnosed, your health care provider will discuss your treatment with you. Your health care provider will help you decide on the best course of treatment. Treatment often depends on your age, health, and other risk factors. The more common methods of treatment are:  · Observation for early stage prostate cancer.  · Open surgery. This involves a surgery to remove the prostate.  · Laparoscopic prostatectomy to remove the prostate and lymph nodes.  · Robotic prostatectomy to remove the prostate and lymph nodes.  · External beam radiation, which aims beams of radiation from outside the body at the prostate.  · Internal radiation (brachytherapy), which uses radioactive needles,  pellets (seeds), wires, or catheters implanted directly into the prostate gland.  · High-intensity, focused ultrasonography to destroy cancer cells.  · Cryosurgery to freeze and destroy prostate cancer cells.  · Chemotherapy medicines to stop the growth of cancer cells either by killing them or by stopping them from multiplying.  · Hormone treatment. Medicines that stop your body from producing testosterone, or medicines that block testosterone from reaching cancer cells.  · Orchiectomy. This is surgery to remove your testicles.  HOME CARE INSTRUCTIONS  · Take medicines only as directed by your health care provider.  · Maintain a healthy diet.  · Get plenty of sleep.  · Consider joining a support group. This may help you learn to cope with the stress of having prostate cancer.  · Seek advice to help you  manage treatment side effects.  · Keep all follow-up visits as directed by your health care provider.  · Inform your cancer specialist if you are admitted to the hospital.  · Continue sexual expression. Consider touching, holding, hugging, and caressing as ways to continue sharing sexuality with your partner.  SEEK MEDICAL CARE IF:  · You have trouble urinating.  · You have blood in your urine.  · You have trouble getting an erection.  · You have pain in your hips, back, or chest.  SEEK IMMEDIATE MEDICAL CARE IF:  · You have weakness or numbness in your legs.  · You have involuntary loss of urine or stool (incontinence).     This information is not intended to replace advice given to you by your health care provider. Make sure you discuss any questions you have with your health care provider.     Document Released: 12/18/2006 Document Revised: 04/10/2017 Document Reviewed: 06/06/2014  [employer] Patient Education ©2017 [employer]

## 2017-10-31 ENCOUNTER — TELEPHONE (OUTPATIENT)
Dept: FAMILY MEDICINE CLINIC | Facility: CLINIC | Age: 62
End: 2017-10-31

## 2017-11-01 ENCOUNTER — TELEPHONE (OUTPATIENT)
Dept: FAMILY MEDICINE CLINIC | Facility: CLINIC | Age: 62
End: 2017-11-01

## 2017-11-01 NOTE — TELEPHONE ENCOUNTER
Called and spoke with patient about insurance denial letter for his MRI that we received. Explained to him that insurance is not wanting to approve his MRI and that the only options we have is either to get a prostate biopsy or he could ask his Urologist to see if his office can get an MRI approved. Told the patient that we need to figure out what is going on as soon as possible and not to wait around on this. Per Jh. Patient understood.

## 2017-11-03 ENCOUNTER — OFFICE VISIT (OUTPATIENT)
Dept: FAMILY MEDICINE CLINIC | Facility: CLINIC | Age: 62
End: 2017-11-03

## 2017-11-03 VITALS
HEIGHT: 63 IN | DIASTOLIC BLOOD PRESSURE: 80 MMHG | HEART RATE: 71 BPM | TEMPERATURE: 97.5 F | BODY MASS INDEX: 25.87 KG/M2 | WEIGHT: 146 LBS | OXYGEN SATURATION: 98 % | SYSTOLIC BLOOD PRESSURE: 132 MMHG

## 2017-11-03 DIAGNOSIS — M79.89 LEFT LEG SWELLING: Primary | ICD-10-CM

## 2017-11-03 DIAGNOSIS — C64.2 MALIGNANT NEOPLASM OF LEFT KIDNEY (HCC): ICD-10-CM

## 2017-11-03 PROCEDURE — 99213 OFFICE O/P EST LOW 20 MIN: CPT | Performed by: NURSE PRACTITIONER

## 2017-11-03 NOTE — PROGRESS NOTES
"Chief Complaint   Patient presents with   • Leg Swelling     Follow-up for left leg swelling   • Abdominal Pain     Left side       Subjective   Sadi Stone is a 62 y.o. male    History of Present Illness  Leg Swelling   This is a chronic problem. Episode onset: 5-6 wks. The problem occurs constantly (always swollen, but worse when up on leg alot.  Goes down some at night, never all the way). The problem has been gradually worsening. Associated symptoms include abdominal pain (left lower around to back-ache-come and go, no change with BM, ), fatigue (some), joint swelling, urinary symptoms (decreased urine) and weakness (occasionally). Pertinent negatives include no change in bowel habit, chills, coughing, diaphoresis, fever, headaches, nausea, rash, sore throat or vomiting. Associated symptoms comments: With decreased appetite. The symptoms are aggravated by standing, walking and twisting. He has tried nothing for the symptoms. The treatment provided mild (withm rest) relief.   Pt has mild elevated PSA- saw urology, recommended biopsy, PT does not want at this time. Had US left leg and showed no DVT-10/6/17- reviewed. Pt was told has prostate CA. By urology.     Obtained more records- Pt with history of left renal CA, had partial nephrectomy 12/2016, Dr Sellers. Has had c/o swelling and pain in left groin region, since at least Mar 2017. Saw a Dr leonid Wright, at [facility], for a 2nd opinion about renal CA.  Dr Wright stated at that time that his \"prostate felt rather suspicious for prostate cancer\" at that time.        Allergies   Allergen Reactions   • Bactrim [Sulfamethoxazole-Trimethoprim] Anaphylaxis     Past Medical History:   Diagnosis Date   • Arthritis    • Glaucoma    • Peptic ulceration       Past Surgical History:   Procedure Laterality Date   • KIDNEY SURGERY Left     removed a spot    • KNEE SURGERY     • SHOULDER SURGERY       Social History     Social History   • Marital status:      " Spouse name: N/A   • Number of children: N/A   • Years of education: N/A     Occupational History   • Not on file.     Social History Main Topics   • Smoking status: Never Smoker   • Smokeless tobacco: Never Used   • Alcohol use No   • Drug use: No   • Sexual activity: Not Currently     Other Topics Concern   • Not on file     Social History Narrative        Current Outpatient Prescriptions:   •  ALPHAGAN P 0.1 % solution ophthalmic solution, , Disp: , Rfl:   •  aspirin 81 MG EC tablet, Take 81 mg by mouth Daily., Disp: , Rfl:   •  diphenhydrAMINE (BENADRYL) 25 mg capsule, Take 1 capsule by mouth Every 6 (Six) Hours As Needed for Itching (qhs for itching)., Disp: 30 capsule, Rfl: 0  •  dutasteride (AVODART) 0.5 MG capsule, Take 1 capsule by mouth Daily., Disp: 30 capsule, Rfl: 5  •  loratadine (CLARITIN) 10 MG tablet, Take 1 tablet by mouth Every Morning., Disp: 30 tablet, Rfl: 0  •  Multiple Vitamins-Minerals (MULTIVITAMIN ADULT PO), Take  by mouth., Disp: , Rfl:   •  omeprazole (priLOSEC) 40 MG capsule, Take 1 capsule by mouth Daily., Disp: 30 capsule, Rfl: 2  •  saccharomyces boulardii (FLORASTOR) 250 MG capsule, Take 1 capsule by mouth 2 (Two) Times a Day., Disp: 60 capsule, Rfl: 1  •  tamsulosin (FLOMAX) 0.4 MG capsule 24 hr capsule, Take 1 capsule by mouth Every Night., Disp: 30 capsule, Rfl: 5     Review of Systems   Constitutional: Negative for appetite change, chills, fatigue and fever.   Respiratory: Negative for cough, shortness of breath and wheezing.    Cardiovascular: Negative for chest pain and palpitations.   Gastrointestinal: Negative for abdominal pain, constipation, diarrhea, nausea and vomiting.   Genitourinary: Positive for difficulty urinating. Negative for dysuria.   Musculoskeletal: Positive for joint swelling (left upper leg).   Neurological: Negative for dizziness and headaches.   Psychiatric/Behavioral: Negative for sleep disturbance. The patient is not nervous/anxious.        Objective  "    Vitals:    11/03/17 1004   BP: 132/80   Pulse: 71   Temp: 97.5 °F (36.4 °C)   SpO2: 98%       Results for orders placed or performed in visit on 10/06/17   Comprehensive Metabolic Panel   Result Value Ref Range    Glucose 86 70 - 100 mg/dL    BUN 23 9 - 23 mg/dL    Creatinine 1.50 (H) 0.60 - 1.30 mg/dL    Sodium 138 132 - 146 mmol/L    Potassium 4.9 3.5 - 5.5 mmol/L    Chloride 104 99 - 109 mmol/L    CO2 33.0 (H) 20.0 - 31.0 mmol/L    Calcium 9.3 8.7 - 10.4 mg/dL    Total Protein 7.8 5.7 - 8.2 g/dL    Albumin 3.80 3.20 - 4.80 g/dL    ALT (SGPT) 16 7 - 40 U/L    AST (SGOT) 35 (H) 0 - 33 U/L    Alkaline Phosphatase 98 25 - 100 U/L    Total Bilirubin 0.8 0.3 - 1.2 mg/dL    eGFR  African Amer 57 (L) >60 mL/min/1.73    Globulin 4.0 gm/dL    A/G Ratio 1.0 (L) 1.5 - 2.5 g/dL    BUN/Creatinine Ratio 15.3 7.0 - 25.0    Anion Gap 1.0 (L) 3.0 - 11.0 mmol/L   PSA   Result Value Ref Range    PSA 4.850 (H) 0.000 - 4.000 ng/mL   TSH   Result Value Ref Range    TSH 2.150 0.350 - 5.350 mIU/mL       Physical Exam   Constitutional: He is oriented to person, place, and time. He appears well-developed and well-nourished.   Eyes: Conjunctivae are normal.   Cardiovascular: Normal rate, regular rhythm and normal heart sounds.    Pulmonary/Chest: Effort normal and breath sounds normal.   Musculoskeletal: He exhibits edema (left leg).   Neurological: He is alert and oriented to person, place, and time.   Skin: Skin is warm and dry.   Psychiatric: He has a normal mood and affect. His behavior is normal.   Vitals reviewed.      Assessment/Plan   Problem List Items Addressed This Visit        Genitourinary    Malignant neoplasm of left kidney       Other    Left leg swelling - Primary      Called and got appt for PT to see Urology today, Pt with history of renal CA, elevated PSA, former urologist with comment \"Prostate feels like cancer\"  And swelling left leg/thigh. Very concerned about worsening cancer. Have tried to get MRIs of pelvis, " left thigh and Insurance company would not approve.     Counseling provided on Discussed potential worsening cancer with PT and granddaughter.    Jh Danielle, APRN   11/4/2017   12:56 PM

## 2017-11-04 PROBLEM — C64.9 RENAL CANCER (HCC): Status: ACTIVE | Noted: 2017-11-04

## 2017-11-04 PROBLEM — C64.2 MALIGNANT NEOPLASM OF LEFT KIDNEY (HCC): Status: ACTIVE | Noted: 2017-11-04

## 2017-11-06 ENCOUNTER — OFFICE VISIT (OUTPATIENT)
Dept: FAMILY MEDICINE CLINIC | Facility: CLINIC | Age: 62
End: 2017-11-06

## 2017-11-06 VITALS
WEIGHT: 144.2 LBS | SYSTOLIC BLOOD PRESSURE: 130 MMHG | TEMPERATURE: 98.9 F | HEIGHT: 63 IN | HEART RATE: 76 BPM | DIASTOLIC BLOOD PRESSURE: 68 MMHG | BODY MASS INDEX: 25.55 KG/M2 | OXYGEN SATURATION: 97 %

## 2017-11-06 DIAGNOSIS — M79.89 LEFT LEG SWELLING: Primary | ICD-10-CM

## 2017-11-06 DIAGNOSIS — R97.20 ELEVATED PSA MEASUREMENT: ICD-10-CM

## 2017-11-06 DIAGNOSIS — Z23 NEED FOR IMMUNIZATION AGAINST INFLUENZA: ICD-10-CM

## 2017-11-06 PROCEDURE — 99213 OFFICE O/P EST LOW 20 MIN: CPT | Performed by: NURSE PRACTITIONER

## 2017-11-06 PROCEDURE — 90686 IIV4 VACC NO PRSV 0.5 ML IM: CPT | Performed by: NURSE PRACTITIONER

## 2017-11-06 PROCEDURE — 90471 IMMUNIZATION ADMIN: CPT | Performed by: NURSE PRACTITIONER

## 2017-11-06 RX ORDER — HYDROCODONE BITARTRATE AND ACETAMINOPHEN 5; 325 MG/1; MG/1
TABLET ORAL
COMMUNITY
Start: 2017-11-04 | End: 2018-02-08

## 2017-11-06 NOTE — PROGRESS NOTES
"Chief Complaint   Patient presents with   • Leg Swelling       Subjective   Sadi Stone is a 62 y.o. male    History of Present Illness  Leg Swelling   This is a chronic problem. Episode onset: 5-6 wks. The problem occurs constantly (always swollen, but worse when up on leg alot.  Goes down some at night, never all the way). The problem has been gradually worsening. Associated symptoms include abdominal pain (left lower around to back-ache-come and go, no change with BM, ), fatigue (some), joint swelling, urinary symptoms (decreased urine) and weakness (occasionally). Pertinent negatives include no change in bowel habit, chills, coughing, diaphoresis, fever, headaches, nausea, rash, sore throat or vomiting. Associated symptoms comments: With decreased appetite. The symptoms are aggravated by standing, walking and twisting. He has tried nothing for the symptoms. The treatment provided mild (withm rest) relief.   Pt has mild elevated PSA- saw urology, recommended biopsy, PT does not want at this time. Had US left leg and showed no DVT-10/6/17- reviewed. Pt was told has prostate CA. By urology.      Obtained more records- Pt with history of left renal CA, had partial nephrectomy 12/2016, Dr Sellers. Has had c/o swelling and pain in left groin region, since at least Mar 2017. Saw a Dr Miguel Angel Wright, at [facility], for a 2nd opinion about renal CA.  Dr. rWight stated at that time that his \"prostate felt rather suspicious for prostate cancer\" at that time.     PT went to ER at [facility] per direction of Urology, and was told had \"a blockage in hip region and needs GABO hose\". Getting records, Has pain in left groin region, sharp pain- 5-10/10. If sleeps on left side, pain better, Worse when sleeping on right side. Takes Aleve for pain, does help some. Refuses stronger pain meds.     Allergies   Allergen Reactions   • Bactrim [Sulfamethoxazole-Trimethoprim] Anaphylaxis     Past Medical History:   Diagnosis Date   • Arthritis  "   • Glaucoma    • Peptic ulceration       Past Surgical History:   Procedure Laterality Date   • KIDNEY SURGERY Left     removed a spot    • KNEE SURGERY     • SHOULDER SURGERY       Social History     Social History   • Marital status:      Spouse name: N/A   • Number of children: N/A   • Years of education: N/A     Occupational History   • Not on file.     Social History Main Topics   • Smoking status: Never Smoker   • Smokeless tobacco: Never Used   • Alcohol use No   • Drug use: No   • Sexual activity: Not Currently     Other Topics Concern   • Not on file     Social History Narrative        Current Outpatient Prescriptions:   •  ALPHAGAN P 0.1 % solution ophthalmic solution, , Disp: , Rfl:   •  aspirin 81 MG EC tablet, Take 81 mg by mouth Daily., Disp: , Rfl:   •  diphenhydrAMINE (BENADRYL) 25 mg capsule, Take 1 capsule by mouth Every 6 (Six) Hours As Needed for Itching (qhs for itching)., Disp: 30 capsule, Rfl: 0  •  dutasteride (AVODART) 0.5 MG capsule, Take 1 capsule by mouth Daily., Disp: 30 capsule, Rfl: 5  •  loratadine (CLARITIN) 10 MG tablet, Take 1 tablet by mouth Every Morning., Disp: 30 tablet, Rfl: 0  •  Multiple Vitamins-Minerals (MULTIVITAMIN ADULT PO), Take  by mouth., Disp: , Rfl:   •  omeprazole (priLOSEC) 40 MG capsule, Take 1 capsule by mouth Daily., Disp: 30 capsule, Rfl: 2  •  saccharomyces boulardii (FLORASTOR) 250 MG capsule, Take 1 capsule by mouth 2 (Two) Times a Day., Disp: 60 capsule, Rfl: 1  •  tamsulosin (FLOMAX) 0.4 MG capsule 24 hr capsule, Take 1 capsule by mouth Every Night., Disp: 30 capsule, Rfl: 5  •  HYDROcodone-acetaminophen (NORCO) 5-325 MG per tablet, , Disp: , Rfl:      Review of Systems  Constitutional: Negative for appetite change, chills, fatigue and fever.   Respiratory: Negative for cough, shortness of breath and wheezing.    Cardiovascular: Negative for chest pain and palpitations.   Gastrointestinal: Negative for abdominal pain, constipation, diarrhea,  nausea and vomiting.   Genitourinary: Positive for difficulty urinating. Negative for dysuria.   Musculoskeletal: Positive for joint swelling (left upper leg-seems worse).   Neurological: Negative for dizziness and headaches.   Psychiatric/Behavioral: Negative for sleep disturbance. The patient is not nervous/anxious.       Objective     Vitals:    11/06/17 1344   BP: 130/68   Pulse: 76   Temp: 98.9 °F (37.2 °C)   SpO2: 97%       Results for orders placed or performed in visit on 10/06/17   Comprehensive Metabolic Panel   Result Value Ref Range    Glucose 86 70 - 100 mg/dL    BUN 23 9 - 23 mg/dL    Creatinine 1.50 (H) 0.60 - 1.30 mg/dL    Sodium 138 132 - 146 mmol/L    Potassium 4.9 3.5 - 5.5 mmol/L    Chloride 104 99 - 109 mmol/L    CO2 33.0 (H) 20.0 - 31.0 mmol/L    Calcium 9.3 8.7 - 10.4 mg/dL    Total Protein 7.8 5.7 - 8.2 g/dL    Albumin 3.80 3.20 - 4.80 g/dL    ALT (SGPT) 16 7 - 40 U/L    AST (SGOT) 35 (H) 0 - 33 U/L    Alkaline Phosphatase 98 25 - 100 U/L    Total Bilirubin 0.8 0.3 - 1.2 mg/dL    eGFR  African Amer 57 (L) >60 mL/min/1.73    Globulin 4.0 gm/dL    A/G Ratio 1.0 (L) 1.5 - 2.5 g/dL    BUN/Creatinine Ratio 15.3 7.0 - 25.0    Anion Gap 1.0 (L) 3.0 - 11.0 mmol/L   PSA   Result Value Ref Range    PSA 4.850 (H) 0.000 - 4.000 ng/mL   TSH   Result Value Ref Range    TSH 2.150 0.350 - 5.350 mIU/mL       Physical Exam  Constitutional: He is oriented to person, place, and time. He appears well-developed and well-nourished.   Eyes: Conjunctivae are normal.   Cardiovascular: Normal rate, regular rhythm and normal heart sounds.    Pulmonary/Chest: Effort normal and breath sounds normal.   Abdominal: Soft. There is tenderness (left lower quad, into hip, and left low back).   Musculoskeletal: Normal range of motion.   Left thigh 21.5 inches, Right thigh 18 inches   Neurological: He is alert and oriented to person, place, and time.   Skin: Skin is warm and dry.   Psychiatric: He has a normal mood and affect. His  behavior is normal.   Vitals reviewed.    Assessment/Plan   Problem List Items Addressed This Visit        Other    Elevated PSA measurement    Left leg swelling - Primary    Relevant Orders    Stocking GABO Knee Long LG LF      Other Visit Diagnoses     Need for immunization against influenza        Relevant Orders    Flu Vaccine Quad PF 3YR+ (Completed)      PSA- Pt seeing urology today. Discussed importance of keeping appt with Urology.     Ordered GABO hose for leg.    Counseling provided on Edema.    Jh Danielle, APRN   11/9/2017   3:15 PM

## 2017-11-06 NOTE — PATIENT INSTRUCTIONS
Prostate Cancer   Prostate cancer is the abnormal growth of cells in your prostate gland. Your prostate gland is involved in the production of semen. It is located below your bladder and in front of your rectum. A normal prostate gland is the size of a walnut and surrounds the tube that carries urine from the bladder (urethra).   RISK FACTORS  · Age older than 65 years.  ·  race.  · Obesity.  · Family history of prostate cancer.  · Family history of breast cancer.  SIGNS AND SYMPTOMS   · Frequent urination.  · Weak or interrupted flow of urine.  · Difficulty starting or stopping urination.  · Inability to urinate.  · Painful or burning urination.  · Painful ejaculation.  · Blood in urine or semen.  · Persistent pain or discomfort in the lower back, lower abdomen, hips, or upper thighs.  · Difficulty getting an erection.  · Difficulty emptying your bladder completely.  DIAGNOSIS   Prostate cancer can be diagnosed by a digital rectal exam, prostate-specific antigen (PSA) blood test, transrectal ultrasonography, and then a biopsy to test a tissue sample. Usually 8-12 samples are taken. The tissue is sent to a specialist who looks at tissues and cells (pathologist).  If cancer is diagnosed, the next step is to stage the cancer. This means it is put in a category based on how far the cancer has spread. This is important for helping your health care providers plan appropriate treatment.  The different stages of prostate cancer are as follows:  · Stage I. Cancer is found in the prostate only. It cannot be felt during a digital rectal exam and is not visible by imaging. It is usually found accidentally, such as during surgery for other prostate problems.  · Stage II. Cancer is more advanced than in stage I but has not spread outside the prostate.  · Stage III. Cancer has spread beyond the outer layer of the prostate to nearby tissues. Cancer may be found in the seminal vesicles.  · Stage IV. Cancer has  spread to lymph nodes or to other parts of the body. The cancer may have spread to the bladder, rectum, bones, liver, or lungs. Prostate cancer often spreads to the bones.  Imaging scans, such as a bone scan, CT, PET, or MRI, are done to help in the staging process.  TREATMENT   Treatments such as surgery, medicines, and radiation may be recommended based on the stage of the cancer and other factors. Once cancer of the prostate has been diagnosed, your health care provider will discuss your treatment with you. Your health care provider will help you decide on the best course of treatment. Treatment often depends on your age, health, and other risk factors. The more common methods of treatment are:  · Observation for early stage prostate cancer.  · Open surgery. This involves a surgery to remove the prostate.  · Laparoscopic prostatectomy to remove the prostate and lymph nodes.  · Robotic prostatectomy to remove the prostate and lymph nodes.  · External beam radiation, which aims beams of radiation from outside the body at the prostate.  · Internal radiation (brachytherapy), which uses radioactive needles,  pellets (seeds), wires, or catheters implanted directly into the prostate gland.  · High-intensity, focused ultrasonography to destroy cancer cells.  · Cryosurgery to freeze and destroy prostate cancer cells.  · Chemotherapy medicines to stop the growth of cancer cells either by killing them or by stopping them from multiplying.  · Hormone treatment. Medicines that stop your body from producing testosterone, or medicines that block testosterone from reaching cancer cells.  · Orchiectomy. This is surgery to remove your testicles.  HOME CARE INSTRUCTIONS  · Take medicines only as directed by your health care provider.  · Maintain a healthy diet.  · Get plenty of sleep.  · Consider joining a support group. This may help you learn to cope with the stress of having prostate cancer.  · Seek advice to help you  manage treatment side effects.  · Keep all follow-up visits as directed by your health care provider.  · Inform your cancer specialist if you are admitted to the hospital.  · Continue sexual expression. Consider touching, holding, hugging, and caressing as ways to continue sharing sexuality with your partner.  SEEK MEDICAL CARE IF:  · You have trouble urinating.  · You have blood in your urine.  · You have trouble getting an erection.  · You have pain in your hips, back, or chest.  SEEK IMMEDIATE MEDICAL CARE IF:  · You have weakness or numbness in your legs.  · You have involuntary loss of urine or stool (incontinence).     This information is not intended to replace advice given to you by your health care provider. Make sure you discuss any questions you have with your health care provider.     Document Released: 12/18/2006 Document Revised: 04/10/2017 Document Reviewed: 06/06/2014  "[employer] Interactive Patient Education ©2017 "[employer] Inc[employer]  Edema  Edema is an abnormal buildup of fluids in your body tissues. Edema is somewhat dependent on gravity to pull the fluid to the lowest place in your body. That makes the condition more common in the legs and thighs (lower extremities). Painless swelling of the feet and ankles is common and becomes more likely as you get older. It is also common in looser tissues, like around your eyes.   When the affected area is squeezed, the fluid may move out of that spot and leave a dent for a few moments. This dent is called pitting.   CAUSES   There are many possible causes of edema. Eating too much salt and being on your feet or sitting for a long time can cause edema in your legs and ankles. Hot weather may make edema worse. Common medical causes of edema include:  · Heart failure.  · Liver disease.  · Kidney disease.  · Weak blood vessels in your legs.  · Cancer.  · An injury.  · Pregnancy.  · Some medications.  · Obesity.   SYMPTOMS   Edema is usually painless. Your skin may look  swollen or shiny.   DIAGNOSIS   Your health care provider may be able to diagnose edema by asking about your medical history and doing a physical exam. You may need to have tests such as X-rays, an electrocardiogram, or blood tests to check for medical conditions that may cause edema.   TREATMENT   Edema treatment depends on the cause. If you have heart, liver, or kidney disease, you need the treatment appropriate for these conditions. General treatment may include:  · Elevation of the affected body part above the level of your heart.  · Compression of the affected body part. Pressure from elastic bandages or support stockings squeezes the tissues and forces fluid back into the blood vessels. This keeps fluid from entering the tissues.  · Restriction of fluid and salt intake.  · Use of a water pill (diuretic). These medications are appropriate only for some types of edema. They pull fluid out of your body and make you urinate more often. This gets rid of fluid and reduces swelling, but diuretics can have side effects. Only use diuretics as directed by your health care provider.  HOME CARE INSTRUCTIONS   · Keep the affected body part above the level of your heart when you are lying down.    · Do not sit still or stand for prolonged periods.    · Do not put anything directly under your knees when lying down.  · Do not wear constricting clothing or garters on your upper legs.    · Exercise your legs to work the fluid back into your blood vessels. This may help the swelling go down.    · Wear elastic bandages or support stockings to reduce ankle swelling as directed by your health care provider.    · Eat a low-salt diet to reduce fluid if your health care provider recommends it.    · Only take medicines as directed by your health care provider.   SEEK MEDICAL CARE IF:   · Your edema is not responding to treatment.  · You have heart, liver, or kidney disease and notice symptoms of edema.  · You have edema in your legs  that does not improve after elevating them.    · You have sudden and unexplained weight gain.  SEEK IMMEDIATE MEDICAL CARE IF:   · You develop shortness of breath or chest pain.    · You cannot breathe when you lie down.  · You develop pain, redness, or warmth in the swollen areas.    · You have heart, liver, or kidney disease and suddenly get edema.  · You have a fever and your symptoms suddenly get worse.  MAKE SURE YOU:   · Understand these instructions.  · Will watch your condition.  · Will get help right away if you are not doing well or get worse.     This information is not intended to replace advice given to you by your health care provider. Make sure you discuss any questions you have with your health care provider.     Document Released: 12/18/2006 Document Revised: 04/10/2017 Document Reviewed: 10/10/2014  [employer] Interactive Patient Education ©2017 [employer]

## 2017-12-13 ENCOUNTER — OFFICE VISIT (OUTPATIENT)
Dept: FAMILY MEDICINE CLINIC | Facility: CLINIC | Age: 62
End: 2017-12-13

## 2017-12-13 VITALS
HEART RATE: 75 BPM | BODY MASS INDEX: 24.91 KG/M2 | WEIGHT: 140.6 LBS | DIASTOLIC BLOOD PRESSURE: 72 MMHG | OXYGEN SATURATION: 98 % | HEIGHT: 63 IN | TEMPERATURE: 97.4 F | SYSTOLIC BLOOD PRESSURE: 120 MMHG

## 2017-12-13 DIAGNOSIS — J06.9 ACUTE UPPER RESPIRATORY INFECTION: Primary | ICD-10-CM

## 2017-12-13 DIAGNOSIS — C77.5 PROSTATE CANCER METASTATIC TO INTRAPELVIC LYMPH NODE (HCC): ICD-10-CM

## 2017-12-13 DIAGNOSIS — C61 PROSTATE CANCER METASTATIC TO INTRAPELVIC LYMPH NODE (HCC): ICD-10-CM

## 2017-12-13 PROBLEM — N40.1 BENIGN PROSTATIC HYPERPLASIA WITH NOCTURIA: Status: RESOLVED | Noted: 2017-08-09 | Resolved: 2017-12-13

## 2017-12-13 PROBLEM — R35.1 BENIGN PROSTATIC HYPERPLASIA WITH NOCTURIA: Status: RESOLVED | Noted: 2017-08-09 | Resolved: 2017-12-13

## 2017-12-13 LAB
EXPIRATION DATE: NORMAL
FLUAV AG NPH QL: NEGATIVE
FLUBV AG NPH QL: NEGATIVE
INTERNAL CONTROL: NORMAL
Lab: NORMAL

## 2017-12-13 PROCEDURE — 87804 INFLUENZA ASSAY W/OPTIC: CPT | Performed by: NURSE PRACTITIONER

## 2017-12-13 PROCEDURE — 99213 OFFICE O/P EST LOW 20 MIN: CPT | Performed by: NURSE PRACTITIONER

## 2017-12-13 RX ORDER — MEGESTROL ACETATE 125 MG/ML
SUSPENSION ORAL
COMMUNITY
Start: 2017-12-12 | End: 2018-02-08

## 2017-12-13 RX ORDER — BROMPHENIRAMINE MALEATE, PSEUDOEPHEDRINE HYDROCHLORIDE, AND DEXTROMETHORPHAN HYDROBROMIDE 2; 30; 10 MG/5ML; MG/5ML; MG/5ML
5 SYRUP ORAL 4 TIMES DAILY PRN
Qty: 120 ML | Refills: 1 | Status: SHIPPED | OUTPATIENT
Start: 2017-12-13 | End: 2018-02-08

## 2017-12-13 RX ORDER — AMOXICILLIN AND CLAVULANATE POTASSIUM 875; 125 MG/1; MG/1
1 TABLET, FILM COATED ORAL 2 TIMES DAILY
Qty: 14 TABLET | Refills: 0 | Status: SHIPPED | OUTPATIENT
Start: 2017-12-13 | End: 2018-02-08

## 2017-12-13 NOTE — PATIENT INSTRUCTIONS
Prostate Cancer   Prostate cancer is the abnormal growth of cells in your prostate gland. Your prostate gland is involved in the production of semen. It is located below your bladder and in front of your rectum. A normal prostate gland is the size of a walnut and surrounds the tube that carries urine from the bladder (urethra).   RISK FACTORS  · Age older than 65 years.  ·  race.  · Obesity.  · Family history of prostate cancer.  · Family history of breast cancer.  SIGNS AND SYMPTOMS   · Frequent urination.  · Weak or interrupted flow of urine.  · Difficulty starting or stopping urination.  · Inability to urinate.  · Painful or burning urination.  · Painful ejaculation.  · Blood in urine or semen.  · Persistent pain or discomfort in the lower back, lower abdomen, hips, or upper thighs.  · Difficulty getting an erection.  · Difficulty emptying your bladder completely.  DIAGNOSIS   Prostate cancer can be diagnosed by a digital rectal exam, prostate-specific antigen (PSA) blood test, transrectal ultrasonography, and then a biopsy to test a tissue sample. Usually 8-12 samples are taken. The tissue is sent to a specialist who looks at tissues and cells (pathologist).  If cancer is diagnosed, the next step is to stage the cancer. This means it is put in a category based on how far the cancer has spread. This is important for helping your health care providers plan appropriate treatment.  The different stages of prostate cancer are as follows:  · Stage I. Cancer is found in the prostate only. It cannot be felt during a digital rectal exam and is not visible by imaging. It is usually found accidentally, such as during surgery for other prostate problems.  · Stage II. Cancer is more advanced than in stage I but has not spread outside the prostate.  · Stage III. Cancer has spread beyond the outer layer of the prostate to nearby tissues. Cancer may be found in the seminal vesicles.  · Stage IV. Cancer has  spread to lymph nodes or to other parts of the body. The cancer may have spread to the bladder, rectum, bones, liver, or lungs. Prostate cancer often spreads to the bones.  Imaging scans, such as a bone scan, CT, PET, or MRI, are done to help in the staging process.  TREATMENT   Treatments such as surgery, medicines, and radiation may be recommended based on the stage of the cancer and other factors. Once cancer of the prostate has been diagnosed, your health care provider will discuss your treatment with you. Your health care provider will help you decide on the best course of treatment. Treatment often depends on your age, health, and other risk factors. The more common methods of treatment are:  · Observation for early stage prostate cancer.  · Open surgery. This involves a surgery to remove the prostate.  · Laparoscopic prostatectomy to remove the prostate and lymph nodes.  · Robotic prostatectomy to remove the prostate and lymph nodes.  · External beam radiation, which aims beams of radiation from outside the body at the prostate.  · Internal radiation (brachytherapy), which uses radioactive needles,  pellets (seeds), wires, or catheters implanted directly into the prostate gland.  · High-intensity, focused ultrasonography to destroy cancer cells.  · Cryosurgery to freeze and destroy prostate cancer cells.  · Chemotherapy medicines to stop the growth of cancer cells either by killing them or by stopping them from multiplying.  · Hormone treatment. Medicines that stop your body from producing testosterone, or medicines that block testosterone from reaching cancer cells.  · Orchiectomy. This is surgery to remove your testicles.  HOME CARE INSTRUCTIONS  · Take medicines only as directed by your health care provider.  · Maintain a healthy diet.  · Get plenty of sleep.  · Consider joining a support group. This may help you learn to cope with the stress of having prostate cancer.  · Seek advice to help you  manage treatment side effects.  · Keep all follow-up visits as directed by your health care provider.  · Inform your cancer specialist if you are admitted to the hospital.  · Continue sexual expression. Consider touching, holding, hugging, and caressing as ways to continue sharing sexuality with your partner.  SEEK MEDICAL CARE IF:  · You have trouble urinating.  · You have blood in your urine.  · You have trouble getting an erection.  · You have pain in your hips, back, or chest.  SEEK IMMEDIATE MEDICAL CARE IF:  · You have weakness or numbness in your legs.  · You have involuntary loss of urine or stool (incontinence).     This information is not intended to replace advice given to you by your health care provider. Make sure you discuss any questions you have with your health care provider.     Document Released: 12/18/2006 Document Revised: 04/10/2017 Document Reviewed: 06/06/2014  [employer] Interactive Patient Education ©2017 [employer]

## 2017-12-13 NOTE — PROGRESS NOTES
Chief Complaint   Patient presents with   • URI       Subjective   Sadi Stone is a 62 y.o. male    URI    This is a new problem. Episode onset: 3-4 days, has recent dx with prostate CA which has spread. The problem has been unchanged. There has been no fever. Associated symptoms include congestion, coughing and a sore throat. Pertinent negatives include no abdominal pain, chest pain, diarrhea, dysuria, ear pain, headaches, nausea, plugged ear sensation, rhinorrhea, sinus pain, swollen glands or wheezing. Treatments tried: tried cough syrup, halls drops, OJ, ASA, not help. The treatment provided no relief.   Did take daughter to [facility], was exposed to similar, SX are worse at night.      Allergies   Allergen Reactions   • Bactrim [Sulfamethoxazole-Trimethoprim] Anaphylaxis     Past Medical History:   Diagnosis Date   • Arthritis    • Glaucoma    • Peptic ulceration       Past Surgical History:   Procedure Laterality Date   • KIDNEY SURGERY Left     removed a spot    • KNEE SURGERY     • SHOULDER SURGERY       Social History     Social History   • Marital status:      Spouse name: N/A   • Number of children: N/A   • Years of education: N/A     Occupational History   • Not on file.     Social History Main Topics   • Smoking status: Never Smoker   • Smokeless tobacco: Never Used   • Alcohol use No   • Drug use: No   • Sexual activity: Not Currently     Other Topics Concern   • Not on file     Social History Narrative        Current Outpatient Prescriptions:   •  ALPHAGAN P 0.1 % solution ophthalmic solution, , Disp: , Rfl:   •  aspirin 81 MG EC tablet, Take 81 mg by mouth Daily., Disp: , Rfl:   •  diphenhydrAMINE (BENADRYL) 25 mg capsule, Take 1 capsule by mouth Every 6 (Six) Hours As Needed for Itching (qhs for itching)., Disp: 30 capsule, Rfl: 0  •  dutasteride (AVODART) 0.5 MG capsule, Take 1 capsule by mouth Daily., Disp: 30 capsule, Rfl: 5  •  HYDROcodone-acetaminophen (NORCO) 5-325 MG per tablet, , Disp:  , Rfl:   •  loratadine (CLARITIN) 10 MG tablet, Take 1 tablet by mouth Every Morning., Disp: 30 tablet, Rfl: 0  •  megestrol (MEGACE ES) 625 MG/5ML suspension, , Disp: , Rfl:   •  Multiple Vitamins-Minerals (MULTIVITAMIN ADULT PO), Take  by mouth., Disp: , Rfl:   •  omeprazole (priLOSEC) 40 MG capsule, Take 1 capsule by mouth Daily., Disp: 30 capsule, Rfl: 2  •  saccharomyces boulardii (FLORASTOR) 250 MG capsule, Take 1 capsule by mouth 2 (Two) Times a Day., Disp: 60 capsule, Rfl: 1  •  tamsulosin (FLOMAX) 0.4 MG capsule 24 hr capsule, Take 1 capsule by mouth Every Night., Disp: 30 capsule, Rfl: 5  •  amoxicillin-clavulanate (AUGMENTIN) 875-125 MG per tablet, Take 1 tablet by mouth 2 (Two) Times a Day., Disp: 14 tablet, Rfl: 0  •  brompheniramine-pseudoephedrine-DM 30-2-10 MG/5ML syrup, Take 5 mL by mouth 4 (Four) Times a Day As Needed for Cough., Disp: 120 mL, Rfl: 1     Review of Systems   Constitutional: Negative for chills and fever.   HENT: Positive for congestion and sore throat. Negative for ear pain, rhinorrhea and sinus pain.    Respiratory: Positive for cough. Negative for wheezing.    Cardiovascular: Negative for chest pain.   Gastrointestinal: Negative for abdominal pain, diarrhea and nausea.   Genitourinary: Negative for difficulty urinating and dysuria.   Neurological: Negative for headaches.   Psychiatric/Behavioral: Positive for sleep disturbance.       Objective     Vitals:    12/13/17 1535   BP: 120/72   Pulse: 75   Temp: 97.4 °F (36.3 °C)   SpO2: 98%       Results for orders placed or performed in visit on 12/13/17   POCT Influenza A/B   Result Value Ref Range    Rapid Influenza A Ag NEGATIVE     Rapid Influenza B Ag NEGATIVE     Internal Control Passed Passed    Lot Number [id number]     Expiration Date 02/01/2019        Physical Exam   Constitutional: He is oriented to person, place, and time. He appears well-developed and well-nourished.   HENT:   Head: Normocephalic and atraumatic.   Right Ear:  Hearing and external ear normal. A middle ear effusion is present.   Left Ear: Hearing and external ear normal. A middle ear effusion is present.   Nose: Mucosal edema and rhinorrhea present. Right sinus exhibits no maxillary sinus tenderness and no frontal sinus tenderness. Left sinus exhibits no maxillary sinus tenderness and no frontal sinus tenderness.   Mouth/Throat: Uvula is midline and mucous membranes are normal. Posterior oropharyngeal erythema present.   Cardiovascular: Normal rate, regular rhythm and normal heart sounds.    Pulmonary/Chest: Effort normal and breath sounds normal.   Neurological: He is alert and oriented to person, place, and time.   Skin: Skin is warm and dry.   Psychiatric: He has a normal mood and affect. His behavior is normal.   Vitals reviewed.      Assessment/Plan   Problem List Items Addressed This Visit        Immune and Lymphatic    Prostate cancer metastatic to intrapelvic lymph node      Other Visit Diagnoses     Acute upper respiratory infection    -  Primary    Relevant Medications    amoxicillin-clavulanate (AUGMENTIN) 875-125 MG per tablet    brompheniramine-pseudoephedrine-DM 30-2-10 MG/5ML syrup    Other Relevant Orders    POCT Influenza A/B (Completed)      Due to recent CA of prostate, will treat.   Patient was encouraged to keep me informed of any acute changes, lack of improvement, or any new concerning symptoms.Plan of care discussed with pt. They verbalized understanding and agreement. Patient to take medications as directed. Make sure to take all of them. Return if no improvement or worsens in 5-7 days.     Counseling provided on Prostate cancer.    Jh Danielle, APRN   12/13/2017   4:33 PM

## 2017-12-23 ENCOUNTER — HOSPITAL ENCOUNTER (EMERGENCY)
Facility: HOSPITAL | Age: 62
Discharge: HOME | End: 2017-12-23

## 2017-12-23 VITALS
SYSTOLIC BLOOD PRESSURE: 153 MMHG | HEIGHT: 68 IN | RESPIRATION RATE: 18 BRPM | BODY MASS INDEX: 21.67 KG/M2 | OXYGEN SATURATION: 98 % | WEIGHT: 143 LBS | DIASTOLIC BLOOD PRESSURE: 78 MMHG | HEART RATE: 97 BPM | TEMPERATURE: 98 F

## 2017-12-28 ENCOUNTER — TELEPHONE (OUTPATIENT)
Dept: FAMILY MEDICINE CLINIC | Facility: CLINIC | Age: 62
End: 2017-12-28

## 2017-12-29 DIAGNOSIS — M79.89 LEFT LEG SWELLING: Primary | ICD-10-CM

## 2018-01-01 ENCOUNTER — LAB (OUTPATIENT)
Dept: LAB | Facility: HOSPITAL | Age: 63
End: 2018-01-01

## 2018-01-01 ENCOUNTER — SPECIALTY PHARMACY (OUTPATIENT)
Dept: ONCOLOGY | Facility: HOSPITAL | Age: 63
End: 2018-01-01

## 2018-01-01 ENCOUNTER — OFFICE VISIT (OUTPATIENT)
Dept: ONCOLOGY | Facility: CLINIC | Age: 63
End: 2018-01-01

## 2018-01-01 ENCOUNTER — OFFICE VISIT (OUTPATIENT)
Dept: FAMILY MEDICINE CLINIC | Facility: CLINIC | Age: 63
End: 2018-01-01

## 2018-01-01 ENCOUNTER — DOCUMENTATION (OUTPATIENT)
Dept: ONCOLOGY | Facility: CLINIC | Age: 63
End: 2018-01-01

## 2018-01-01 ENCOUNTER — APPOINTMENT (OUTPATIENT)
Dept: ONCOLOGY | Facility: HOSPITAL | Age: 63
End: 2018-01-01

## 2018-01-01 ENCOUNTER — LAB REQUISITION (OUTPATIENT)
Dept: LAB | Facility: HOSPITAL | Age: 63
End: 2018-01-01

## 2018-01-01 ENCOUNTER — HOSPITAL ENCOUNTER (OUTPATIENT)
Dept: NUCLEAR MEDICINE | Facility: HOSPITAL | Age: 63
Discharge: HOME | End: 2018-10-12
Attending: INTERNAL MEDICINE

## 2018-01-01 ENCOUNTER — TELEPHONE (OUTPATIENT)
Dept: FAMILY MEDICINE CLINIC | Facility: CLINIC | Age: 63
End: 2018-01-01

## 2018-01-01 ENCOUNTER — HOSPITAL ENCOUNTER (OUTPATIENT)
Dept: CT IMAGING | Facility: HOSPITAL | Age: 63
Discharge: HOME | End: 2018-10-12
Attending: INTERNAL MEDICINE | Admitting: INTERNAL MEDICINE

## 2018-01-01 VITALS
TEMPERATURE: 98.9 F | HEART RATE: 84 BPM | OXYGEN SATURATION: 96 % | HEIGHT: 68 IN | SYSTOLIC BLOOD PRESSURE: 136 MMHG | WEIGHT: 170.4 LBS | DIASTOLIC BLOOD PRESSURE: 84 MMHG | BODY MASS INDEX: 25.82 KG/M2

## 2018-01-01 VITALS
WEIGHT: 155 LBS | OXYGEN SATURATION: 98 % | RESPIRATION RATE: 16 BRPM | SYSTOLIC BLOOD PRESSURE: 116 MMHG | DIASTOLIC BLOOD PRESSURE: 62 MMHG | TEMPERATURE: 97 F | BODY MASS INDEX: 23.49 KG/M2 | HEIGHT: 68 IN | HEART RATE: 80 BPM

## 2018-01-01 VITALS
SYSTOLIC BLOOD PRESSURE: 138 MMHG | BODY MASS INDEX: 24.86 KG/M2 | TEMPERATURE: 98.5 F | RESPIRATION RATE: 18 BRPM | WEIGHT: 164 LBS | HEART RATE: 101 BPM | HEIGHT: 68 IN | DIASTOLIC BLOOD PRESSURE: 87 MMHG

## 2018-01-01 DIAGNOSIS — Z00.00 ROUTINE GENERAL MEDICAL EXAMINATION AT A HEALTH CARE FACILITY: ICD-10-CM

## 2018-01-01 DIAGNOSIS — C61 MALIGNANT TUMOR OF PROSTATE (HCC): ICD-10-CM

## 2018-01-01 DIAGNOSIS — C61 MALIGNANT TUMOR OF PROSTATE (HCC): Primary | ICD-10-CM

## 2018-01-01 DIAGNOSIS — J06.9 ACUTE UPPER RESPIRATORY INFECTION: Primary | ICD-10-CM

## 2018-01-01 LAB
ALBUMIN SERPL-MCNC: 3.67 G/DL (ref 3.2–4.8)
ALBUMIN SERPL-MCNC: 3.83 G/DL (ref 3.2–4.8)
ALBUMIN/GLOB SERPL: 1.4 G/DL (ref 1.5–2.5)
ALBUMIN/GLOB SERPL: 1.4 G/DL (ref 1.5–2.5)
ALP SERPL-CCNC: 104 U/L (ref 25–100)
ALP SERPL-CCNC: 123 U/L (ref 25–100)
ALT SERPL W P-5'-P-CCNC: 21 U/L (ref 7–40)
ALT SERPL W P-5'-P-CCNC: 22 U/L (ref 7–40)
ANION GAP SERPL CALCULATED.3IONS-SCNC: 4 MMOL/L (ref 3–11)
ANION GAP SERPL CALCULATED.3IONS-SCNC: 6 MMOL/L (ref 3–11)
AST SERPL-CCNC: 23 U/L (ref 0–33)
AST SERPL-CCNC: 27 U/L (ref 0–33)
BACTERIA SPEC AEROBE CULT: NORMAL
BACTERIA UR CULT: NORMAL
BACTERIA UR CULT: NORMAL
BACTERIA UR QL AUTO: ABNORMAL /HPF
BASOPHILS # BLD AUTO: 0.03 10*3/MM3 (ref 0–0.2)
BASOPHILS NFR BLD AUTO: 0.4 % (ref 0–1)
BILIRUB SERPL-MCNC: 0.7 MG/DL (ref 0.3–1.2)
BILIRUB SERPL-MCNC: 0.8 MG/DL (ref 0.3–1.2)
BILIRUB UR QL STRIP: NEGATIVE
BUN BLD-MCNC: 26 MG/DL (ref 9–23)
BUN BLD-MCNC: 28 MG/DL (ref 9–23)
BUN/CREAT SERPL: 20.5 (ref 7–25)
BUN/CREAT SERPL: 21.1 (ref 7–25)
CALCIUM SPEC-SCNC: 9.1 MG/DL (ref 8.7–10.4)
CALCIUM SPEC-SCNC: 9.2 MG/DL (ref 8.7–10.4)
CHLORIDE SERPL-SCNC: 105 MMOL/L (ref 99–109)
CHLORIDE SERPL-SCNC: 106 MMOL/L (ref 99–109)
CLARITY UR: ABNORMAL
CO2 SERPL-SCNC: 30 MMOL/L (ref 20–31)
CO2 SERPL-SCNC: 30 MMOL/L (ref 20–31)
COLOR UR: YELLOW
CREAT BLD-MCNC: 1.27 MG/DL (ref 0.6–1.3)
CREAT BLD-MCNC: 1.33 MG/DL (ref 0.6–1.3)
CREAT BLDA-MCNC: 1.3 MG/DL (ref 0.6–1.3)
DEPRECATED RDW RBC AUTO: 62.8 FL (ref 37–54)
EOSINOPHIL # BLD AUTO: 0.04 10*3/MM3 (ref 0–0.3)
EOSINOPHIL NFR BLD AUTO: 0.5 % (ref 0–3)
ERYTHROCYTE [DISTWIDTH] IN BLOOD BY AUTOMATED COUNT: 14.8 % (ref 11.3–14.5)
ERYTHROCYTE [DISTWIDTH] IN BLOOD BY AUTOMATED COUNT: 18.5 % (ref 11.3–14.5)
GFR SERPL CREATININE-BSD FRML MDRD: 65 ML/MIN/1.73
GFR SERPL CREATININE-BSD FRML MDRD: 69 ML/MIN/1.73
GLOBULIN UR ELPH-MCNC: 2.6 GM/DL
GLOBULIN UR ELPH-MCNC: 2.7 GM/DL
GLUCOSE BLD-MCNC: 103 MG/DL (ref 70–100)
GLUCOSE BLD-MCNC: 124 MG/DL (ref 70–100)
GLUCOSE UR STRIP-MCNC: NEGATIVE MG/DL
HCT VFR BLD AUTO: 33.2 % (ref 38.9–50.9)
HCT VFR BLD AUTO: 36.3 % (ref 38.9–50.9)
HGB BLD-MCNC: 10.2 G/DL (ref 13.1–17.5)
HGB BLD-MCNC: 12 G/DL (ref 13.1–17.5)
HGB UR QL STRIP.AUTO: ABNORMAL
HYALINE CASTS UR QL AUTO: ABNORMAL /LPF
IMM GRANULOCYTES # BLD: 0.03 10*3/MM3 (ref 0–0.03)
IMM GRANULOCYTES NFR BLD: 0.4 % (ref 0–0.6)
KETONES UR QL STRIP: NEGATIVE
LEUKOCYTE ESTERASE UR QL STRIP.AUTO: ABNORMAL
LYMPHOCYTES # BLD AUTO: 2.6 10*3/MM3 (ref 0.6–4.8)
LYMPHOCYTES # BLD AUTO: 2.8 10*3/MM3 (ref 0.6–4.8)
LYMPHOCYTES NFR BLD AUTO: 23 % (ref 24–44)
LYMPHOCYTES NFR BLD AUTO: 31.7 % (ref 24–44)
MCH RBC QN AUTO: 28.7 PG (ref 27–31)
MCH RBC QN AUTO: 31.4 PG (ref 27–31)
MCHC RBC AUTO-ENTMCNC: 30.7 G/DL (ref 32–36)
MCHC RBC AUTO-ENTMCNC: 33.1 G/DL (ref 32–36)
MCV RBC AUTO: 93.3 FL (ref 80–99)
MCV RBC AUTO: 94.8 FL (ref 80–99)
MONOCYTES # BLD AUTO: 0.3 10*3/MM3 (ref 0–1)
MONOCYTES # BLD AUTO: 0.5 10*3/MM3 (ref 0–1)
MONOCYTES NFR BLD AUTO: 2.7 % (ref 0–12)
MONOCYTES NFR BLD AUTO: 6.1 % (ref 0–12)
NEUTROPHILS # BLD AUTO: 4.99 10*3/MM3 (ref 1.5–8.3)
NEUTROPHILS # BLD AUTO: 9 10*3/MM3 (ref 1.5–8.3)
NEUTROPHILS NFR BLD AUTO: 60.9 % (ref 41–71)
NEUTROPHILS NFR BLD AUTO: 74.3 % (ref 41–71)
NITRITE UR QL STRIP: NEGATIVE
PH UR STRIP.AUTO: 6 [PH] (ref 5–8)
PLATELET # BLD AUTO: 225 10*3/MM3 (ref 150–450)
PLATELET # BLD AUTO: 268 10*3/MM3 (ref 150–450)
PMV BLD AUTO: 7.2 FL (ref 6–12)
PMV BLD AUTO: 9.4 FL (ref 6–12)
POTASSIUM BLD-SCNC: 4.1 MMOL/L (ref 3.5–5.5)
POTASSIUM BLD-SCNC: 4.4 MMOL/L (ref 3.5–5.5)
PROT SERPL-MCNC: 6.3 G/DL (ref 5.7–8.2)
PROT SERPL-MCNC: 6.5 G/DL (ref 5.7–8.2)
PROT UR QL STRIP: ABNORMAL
PSA SERPL-MCNC: 0.7 NG/ML (ref 0–4)
PSA SERPL-MCNC: 1.77 NG/ML (ref 0–4)
RBC # BLD AUTO: 3.56 10*6/MM3 (ref 4.2–5.76)
RBC # BLD AUTO: 3.83 10*6/MM3 (ref 4.2–5.76)
RBC # UR: ABNORMAL /HPF
REF LAB TEST METHOD: ABNORMAL
SODIUM BLD-SCNC: 140 MMOL/L (ref 132–146)
SODIUM BLD-SCNC: 141 MMOL/L (ref 132–146)
SP GR UR STRIP: 1.01 (ref 1–1.03)
SQUAMOUS #/AREA URNS HPF: ABNORMAL /HPF
UROBILINOGEN UR QL STRIP: ABNORMAL
WBC NRBC COR # BLD: 12.1 10*3/MM3 (ref 3.5–10.8)
WBC NRBC COR # BLD: 8.19 10*3/MM3 (ref 3.5–10.8)
WBC UR QL AUTO: ABNORMAL /HPF

## 2018-01-01 PROCEDURE — G0103 PSA SCREENING: HCPCS | Performed by: INTERNAL MEDICINE

## 2018-01-01 PROCEDURE — 99214 OFFICE O/P EST MOD 30 MIN: CPT | Performed by: INTERNAL MEDICINE

## 2018-01-01 PROCEDURE — 25010000002 IOPAMIDOL 61 % SOLUTION: Performed by: INTERNAL MEDICINE

## 2018-01-01 PROCEDURE — 80053 COMPREHEN METABOLIC PANEL: CPT

## 2018-01-01 PROCEDURE — 99213 OFFICE O/P EST LOW 20 MIN: CPT | Performed by: NURSE PRACTITIONER

## 2018-01-01 PROCEDURE — 80053 COMPREHEN METABOLIC PANEL: CPT | Performed by: INTERNAL MEDICINE

## 2018-01-01 PROCEDURE — 81001 URINALYSIS AUTO W/SCOPE: CPT

## 2018-01-01 PROCEDURE — 71260 CT THORAX DX C+: CPT

## 2018-01-01 PROCEDURE — 82565 ASSAY OF CREATININE: CPT

## 2018-01-01 PROCEDURE — 74177 CT ABD & PELVIS W/CONTRAST: CPT

## 2018-01-01 PROCEDURE — 87086 URINE CULTURE/COLONY COUNT: CPT

## 2018-01-01 PROCEDURE — 99215 OFFICE O/P EST HI 40 MIN: CPT | Performed by: INTERNAL MEDICINE

## 2018-01-01 PROCEDURE — 78306 BONE IMAGING WHOLE BODY: CPT

## 2018-01-01 PROCEDURE — 36415 COLL VENOUS BLD VENIPUNCTURE: CPT

## 2018-01-01 PROCEDURE — 85025 COMPLETE CBC W/AUTO DIFF WBC: CPT

## 2018-01-01 PROCEDURE — 0 TECHNETIUM MEDRONATE KIT: Performed by: INTERNAL MEDICINE

## 2018-01-01 PROCEDURE — 85025 COMPLETE CBC W/AUTO DIFF WBC: CPT | Performed by: INTERNAL MEDICINE

## 2018-01-01 PROCEDURE — 84153 ASSAY OF PSA TOTAL: CPT

## 2018-01-01 PROCEDURE — A9503 TC99M MEDRONATE: HCPCS | Performed by: INTERNAL MEDICINE

## 2018-01-01 RX ORDER — TEMAZEPAM 15 MG/1
30 CAPSULE ORAL NIGHTLY PRN
Qty: 60 CAPSULE | Refills: 5 | OUTPATIENT
Start: 2018-01-01 | End: 2019-01-01

## 2018-01-01 RX ORDER — OMEPRAZOLE 40 MG/1
40 CAPSULE, DELAYED RELEASE ORAL DAILY
Qty: 90 CAPSULE | Refills: 1 | OUTPATIENT
Start: 2018-01-01 | End: 2019-01-01 | Stop reason: SDUPTHER

## 2018-01-01 RX ORDER — BROMPHENIRAMINE MALEATE, PSEUDOEPHEDRINE HYDROCHLORIDE, AND DEXTROMETHORPHAN HYDROBROMIDE 2; 30; 10 MG/5ML; MG/5ML; MG/5ML
5 SYRUP ORAL 4 TIMES DAILY PRN
Qty: 120 ML | Refills: 1 | Status: SHIPPED | OUTPATIENT
Start: 2018-01-01 | End: 2019-01-01

## 2018-01-01 RX ORDER — TC 99M MEDRONATE 20 MG/10ML
25.1 INJECTION, POWDER, LYOPHILIZED, FOR SOLUTION INTRAVENOUS
Status: COMPLETED | OUTPATIENT
Start: 2018-01-01 | End: 2018-01-01

## 2018-01-01 RX ORDER — PREDNISONE 1 MG/1
5 TABLET ORAL 2 TIMES DAILY
Qty: 60 TABLET | Refills: 11 | Status: SHIPPED | OUTPATIENT
Start: 2018-01-01 | End: 2019-01-01

## 2018-01-01 RX ORDER — AMOXICILLIN 500 MG/1
500 CAPSULE ORAL 3 TIMES DAILY
Qty: 30 CAPSULE | Refills: 0 | Status: SHIPPED | OUTPATIENT
Start: 2018-01-01 | End: 2019-01-01

## 2018-01-01 RX ORDER — ONDANSETRON HYDROCHLORIDE 8 MG/1
8 TABLET, FILM COATED ORAL 3 TIMES DAILY PRN
Qty: 30 TABLET | Refills: 5 | Status: SHIPPED | OUTPATIENT
Start: 2018-01-01 | End: 2019-01-01

## 2018-01-01 RX ORDER — ABIRATERONE 500 MG/1
1000 TABLET ORAL DAILY
Qty: 60 TABLET | Refills: 3 | Status: SHIPPED | OUTPATIENT
Start: 2018-01-01 | End: 2019-01-01

## 2018-01-01 RX ORDER — BRIMONIDINE TARTRATE 0.15 %
DROPS OPHTHALMIC (EYE)
COMMUNITY
Start: 2018-01-01

## 2018-01-01 RX ADMIN — IOPAMIDOL 90 ML: 612 INJECTION, SOLUTION INTRAVENOUS at 13:09

## 2018-01-01 RX ADMIN — Medication 25.1 MILLICURIE: at 10:26

## 2018-01-04 ENCOUNTER — TELEPHONE (OUTPATIENT)
Dept: FAMILY MEDICINE CLINIC | Facility: CLINIC | Age: 63
End: 2018-01-04

## 2018-01-04 RX ORDER — OMEPRAZOLE 40 MG/1
40 CAPSULE, DELAYED RELEASE ORAL DAILY
Qty: 30 CAPSULE | Refills: 2 | Status: SHIPPED | OUTPATIENT
Start: 2018-01-04 | End: 2018-04-18 | Stop reason: SDUPTHER

## 2018-01-04 NOTE — TELEPHONE ENCOUNTER
E-Rx Omeprazole 40 mg capsules, take 1 capsule po qd #30, 2 refills to [facility] on [facility].

## 2018-02-08 ENCOUNTER — OFFICE VISIT (OUTPATIENT)
Dept: FAMILY MEDICINE CLINIC | Facility: CLINIC | Age: 63
End: 2018-02-08

## 2018-02-08 VITALS
SYSTOLIC BLOOD PRESSURE: 122 MMHG | DIASTOLIC BLOOD PRESSURE: 74 MMHG | BODY MASS INDEX: 22.43 KG/M2 | HEART RATE: 94 BPM | OXYGEN SATURATION: 97 % | HEIGHT: 68 IN | TEMPERATURE: 98.7 F | WEIGHT: 148 LBS

## 2018-02-08 DIAGNOSIS — C61 PROSTATE CANCER METASTATIC TO INTRAPELVIC LYMPH NODE (HCC): ICD-10-CM

## 2018-02-08 DIAGNOSIS — R59.9 SWOLLEN LYMPH NODES: Primary | ICD-10-CM

## 2018-02-08 DIAGNOSIS — C77.5 PROSTATE CANCER METASTATIC TO INTRAPELVIC LYMPH NODE (HCC): ICD-10-CM

## 2018-02-08 PROCEDURE — 99214 OFFICE O/P EST MOD 30 MIN: CPT | Performed by: NURSE PRACTITIONER

## 2018-02-08 RX ORDER — METHOCARBAMOL 500 MG/1
TABLET, FILM COATED ORAL
COMMUNITY
Start: 2017-12-24 | End: 2018-02-08 | Stop reason: HOSPADM

## 2018-02-08 RX ORDER — HYDROCODONE BITARTRATE AND ACETAMINOPHEN 7.5; 325 MG/1; MG/1
TABLET ORAL
COMMUNITY
Start: 2017-12-24 | End: 2018-02-08

## 2018-02-08 NOTE — PROGRESS NOTES
Chief Complaint   Patient presents with   • Swollen Glands     left gland, SX 4 days       Subjective   Sadi Stone is a 62 y.o. male    Swollen Glands   This is a new problem. Episode onset: 4 days. The problem occurs constantly. The problem has been unchanged. Associated symptoms include abdominal pain (epigastric pain with certian foods, Omeprazole not helping. Pt is on ASA 81 mg QD and has a history of PUD), chills, coughing and swollen glands. Pertinent negatives include no anorexia, arthralgias, change in bowel habit, chest pain, congestion, diaphoresis, fatigue, fever, headaches, joint swelling, myalgias, nausea, neck pain, numbness, rash, sore throat, urinary symptoms, vertigo, visual change, vomiting or weakness. Associated symptoms comments: Cavity on left lower jaw. Exacerbated by: Pt has HX of Prostate Cancer, Has had Radiation. He has tried nothing for the symptoms. The treatment provided no relief.   Pt having a CT scan for his Prostate CA tomorrow.       Allergies   Allergen Reactions   • Bactrim [Sulfamethoxazole-Trimethoprim] Anaphylaxis     Past Medical History:   Diagnosis Date   • Arthritis    • Glaucoma    • Peptic ulceration       Past Surgical History:   Procedure Laterality Date   • KIDNEY SURGERY Left     removed a spot    • KNEE SURGERY     • SHOULDER SURGERY       Social History     Social History   • Marital status:      Spouse name: N/A   • Number of children: N/A   • Years of education: N/A     Occupational History   • Not on file.     Social History Main Topics   • Smoking status: Never Smoker   • Smokeless tobacco: Never Used   • Alcohol use No   • Drug use: No   • Sexual activity: Not Currently     Other Topics Concern   • Not on file     Social History Narrative        Current Outpatient Prescriptions:   •  ALPHAGAN P 0.1 % solution ophthalmic solution, , Disp: , Rfl:   •  aspirin 81 MG EC tablet, Take 81 mg by mouth Daily., Disp: , Rfl:   •  diphenhydrAMINE (BENADRYL)  25 mg capsule, Take 1 capsule by mouth Every 6 (Six) Hours As Needed for Itching (qhs for itching)., Disp: 30 capsule, Rfl: 0  •  dutasteride (AVODART) 0.5 MG capsule, Take 1 capsule by mouth Daily., Disp: 30 capsule, Rfl: 5  •  loratadine (CLARITIN) 10 MG tablet, Take 1 tablet by mouth Every Morning., Disp: 30 tablet, Rfl: 0  •  Multiple Vitamins-Minerals (MULTIVITAMIN ADULT PO), Take  by mouth., Disp: , Rfl:   •  omeprazole (priLOSEC) 40 MG capsule, Take 1 capsule by mouth Daily., Disp: 30 capsule, Rfl: 2  •  tamsulosin (FLOMAX) 0.4 MG capsule 24 hr capsule, Take 1 capsule by mouth Every Night., Disp: 30 capsule, Rfl: 5     Review of Systems   Constitutional: Positive for chills. Negative for diaphoresis, fatigue and fever.   HENT: Negative for congestion and sore throat.    Respiratory: Positive for cough.    Cardiovascular: Negative for chest pain.   Gastrointestinal: Positive for abdominal pain (epigastric pain with certian foods, Omeprazole not helping. Pt is on ASA 81 mg QD and has a history of PUD). Negative for anorexia, change in bowel habit, nausea and vomiting.   Genitourinary: Negative for difficulty urinating and dysuria.   Musculoskeletal: Negative for arthralgias, joint swelling, myalgias and neck pain.   Skin: Negative for rash.   Neurological: Negative for vertigo, weakness, numbness and headaches.   Psychiatric/Behavioral: Positive for sleep disturbance.       Objective     Vitals:    02/08/18 0900   BP: 122/74   Pulse: 94   Temp: 98.7 °F (37.1 °C)   SpO2: 97%       Results for orders placed or performed in visit on 12/13/17   POCT Influenza A/B   Result Value Ref Range    Rapid Influenza A Ag NEGATIVE     Rapid Influenza B Ag NEGATIVE     Internal Control Passed Passed    Lot Number [id number]     Expiration Date 02/01/2019        Physical Exam   Constitutional: He appears well-developed and well-nourished.   HENT:   Head: Normocephalic and atraumatic.   Right Ear: Hearing and external ear normal.    Left Ear: Hearing and external ear normal.   Nose: Nose normal. Right sinus exhibits no maxillary sinus tenderness and no frontal sinus tenderness. Left sinus exhibits no maxillary sinus tenderness and no frontal sinus tenderness.   Mouth/Throat: Uvula is midline, oropharynx is clear and moist and mucous membranes are normal.   Cardiovascular: Normal rate, regular rhythm and normal heart sounds.    Pulmonary/Chest: Effort normal and breath sounds normal.   Vitals reviewed.      Assessment/Plan   Problem List Items Addressed This Visit        Immune and Lymphatic    Prostate cancer metastatic to intrapelvic lymph node    Relevant Orders    CT chest w contrast    CT soft tissue neck w contrast      Other Visit Diagnoses     Swollen lymph nodes    -  Primary    Relevant Orders    CT chest w contrast    CT soft tissue neck w contrast      Discussed case with Dr Sellers, Urology at [facility]. He agrees to get CT of chest and neck.     FU 1-2 wks or as needed    Counseling provided on      .    Jh Danielle, BENI   2/8/2018   10:36 AM

## 2018-02-27 ENCOUNTER — OFFICE VISIT (OUTPATIENT)
Dept: FAMILY MEDICINE CLINIC | Facility: CLINIC | Age: 63
End: 2018-02-27

## 2018-02-27 VITALS
OXYGEN SATURATION: 98 % | WEIGHT: 144 LBS | DIASTOLIC BLOOD PRESSURE: 86 MMHG | HEART RATE: 77 BPM | BODY MASS INDEX: 21.9 KG/M2 | SYSTOLIC BLOOD PRESSURE: 132 MMHG

## 2018-02-27 DIAGNOSIS — N13.30 HYDRONEPHROSIS OF LEFT KIDNEY: ICD-10-CM

## 2018-02-27 DIAGNOSIS — I88.9 LYMPHADENITIS: ICD-10-CM

## 2018-02-27 DIAGNOSIS — C77.5 PROSTATE CANCER METASTATIC TO INTRAPELVIC LYMPH NODE (HCC): Primary | ICD-10-CM

## 2018-02-27 DIAGNOSIS — C61 PROSTATE CANCER METASTATIC TO INTRAPELVIC LYMPH NODE (HCC): Primary | ICD-10-CM

## 2018-02-27 PROCEDURE — 99214 OFFICE O/P EST MOD 30 MIN: CPT | Performed by: NURSE PRACTITIONER

## 2018-02-27 NOTE — PROGRESS NOTES
"Chief Complaint   Patient presents with   • Follow-up for swollen lymph nodes       Subjective   Sadi Stone is a 62 y.o. male    History of Present Illness  Pt with swollen lymph node and CT showed a \"necrotic\" lymph node. And the CT of [facility] showed blocked left ureter with hydronephroses. PT reports he was told \"they could put a stent or he could wait\". (Getting records from both offices). Left leg still swollen.     Allergies   Allergen Reactions   • Bactrim [Sulfamethoxazole-Trimethoprim] Anaphylaxis     Past Medical History:   Diagnosis Date   • Arthritis    • Glaucoma    • Peptic ulceration       Past Surgical History:   Procedure Laterality Date   • KIDNEY SURGERY Left     removed a spot    • KNEE SURGERY     • SHOULDER SURGERY       Social History     Social History   • Marital status:      Spouse name: N/A   • Number of children: N/A   • Years of education: N/A     Occupational History   • Not on file.     Social History Main Topics   • Smoking status: Never Smoker   • Smokeless tobacco: Never Used   • Alcohol use No   • Drug use: No   • Sexual activity: Not Currently     Other Topics Concern   • Not on file     Social History Narrative        Current Outpatient Prescriptions:   •  ALPHAGAN P 0.1 % solution ophthalmic solution, , Disp: , Rfl:   •  loratadine (CLARITIN) 10 MG tablet, Take 1 tablet by mouth Every Morning., Disp: 30 tablet, Rfl: 0  •  Multiple Vitamins-Minerals (MULTIVITAMIN ADULT PO), Take  by mouth., Disp: , Rfl:   •  omeprazole (priLOSEC) 40 MG capsule, Take 1 capsule by mouth Daily., Disp: 30 capsule, Rfl: 2  •  tamsulosin (FLOMAX) 0.4 MG capsule 24 hr capsule, Take 1 capsule by mouth Every Night., Disp: 30 capsule, Rfl: 5  •  aspirin 81 MG EC tablet, Take 81 mg by mouth Daily., Disp: , Rfl:   •  diphenhydrAMINE (BENADRYL) 25 mg capsule, Take 1 capsule by mouth Every 6 (Six) Hours As Needed for Itching (qhs for itching)., Disp: 30 capsule, Rfl: 0  •  dutasteride (AVODART) 0.5 MG " capsule, Take 1 capsule by mouth Daily., Disp: 30 capsule, Rfl: 5     Review of Systems   Constitutional: Negative for chills, fatigue and unexpected weight change.   Respiratory: Negative for cough, chest tightness and shortness of breath.    Cardiovascular: Negative for chest pain, palpitations and leg swelling.   Gastrointestinal: Negative for diarrhea, nausea and vomiting.   Genitourinary: Negative for difficulty urinating and dysuria.   Skin: Negative for color change and rash.   Neurological: Negative for dizziness, syncope, weakness and headaches.   Psychiatric/Behavioral: Negative for sleep disturbance.       Objective     Vitals:    02/27/18 0921   BP: 132/86   Pulse: 77   SpO2: 98%       Results for orders placed or performed in visit on 12/13/17   POCT Influenza A/B   Result Value Ref Range    Rapid Influenza A Ag NEGATIVE     Rapid Influenza B Ag NEGATIVE     Internal Control Passed Passed    Lot Number [id number]     Expiration Date 02/01/2019        Physical Exam   Constitutional: He is oriented to person, place, and time. He appears well-developed and well-nourished.   Cardiovascular: Normal rate, regular rhythm and normal heart sounds.    Pulmonary/Chest: Effort normal and breath sounds normal.   Musculoskeletal: He exhibits edema (left lower extremity).   Neurological: He is alert and oriented to person, place, and time.   Skin: Skin is warm and dry.   Psychiatric: He has a normal mood and affect. His behavior is normal.   Vitals reviewed.      Assessment/Plan   Problem List Items Addressed This Visit        Immune and Lymphatic    Prostate cancer metastatic to intrapelvic lymph node - Primary    Lymphadenitis       Genitourinary    Hydronephrosis of left kidney      After discussing case with Dr Christie, I recommended PT proceed with stenting of ureter of left kidney to attempt to preserve renal function. Cont to see Rad Onc for prostate CA.   Cont to follow with ENT for Lymphadenitis.    Patient was  strongly encouraged to keep me informed of any acute changes, lack of improvement, or any new concerning symptoms.Plan of care discussed with pt. They verbalized understanding and agreement.     Counseling provided on Prostate CA.    Jh Danielle, APRN   2/28/2018   7:53 AM

## 2018-02-28 PROBLEM — I88.9 LYMPHADENITIS: Status: ACTIVE | Noted: 2018-02-28

## 2018-02-28 PROBLEM — N13.30 HYDRONEPHROSIS OF LEFT KIDNEY: Status: ACTIVE | Noted: 2018-02-28

## 2018-02-28 NOTE — PATIENT INSTRUCTIONS
Prostate Cancer  The prostate is a walnut-sized gland that is involved in the production of semen. It is located below a man's bladder, in front of the rectum. Prostate cancer is the abnormal growth of cells in the prostate gland.  What are the causes?  The exact cause of this condition is not known.  What increases the risk?  This condition is more likely to develop in men who:  · Are older than age 65.  · Are -American.  · Are obese.  · Have a family history of prostate cancer.  · Have a family history of breast cancer.  What are the signs or symptoms?  Symptoms of this condition include:  · A need to urinate often.  · Weak or interrupted flow of urine.  · Trouble starting or stopping urination.  · Inability to urinate.  · Pain or burning during urination.  · Painful ejaculation.  · Blood in urine or semen.  · Persistent pain or discomfort in the lower back, lower abdomen, hips, or upper thighs.  · Trouble getting an erection.  · Trouble emptying the bladder all the way.  How is this diagnosed?  This condition can be diagnosed with:  · A digital rectal exam. For this exam, a health care provider inserts a gloved finger into the rectum to feel the prostate gland.  · A blood test called a prostate-specific antigen (PSA) test.  · An imaging test called transrectal ultrasonography.  · A procedure in which a sample of tissue is taken from the prostate and examined under a microscope (prostate biopsy).  Once the condition is diagnosed, tests will be done to determine how far the cancer has spread. This is called staging the cancer. Staging may involve imaging tests, such as:  · A bone scan.  · A CT scan.  · A PET scan.  · An MRI.  The stages of prostate cancer are as follows:  · Stage I. At this stage, the cancer is found in the prostate only. The cancer is not visible on imaging tests and it is usually found by accident, such as during a prostate surgery.  · Stage II. At this stage, the cancer is more advanced  than it is in stage I, but the cancer has not spread outside the prostate.  · Stage III. At this stage, the cancer has spread beyond the outer layer of the prostate to nearby tissues. The cancer may be found in the seminal vesicles, which are near the bladder and the prostate.  · Stage IV. At this stage, the cancer has spread other parts of the body, such as the lymph nodes, bones, bladder, rectum, liver, or lungs.  How is this treated?  Treatment for this condition depends on several factors, including the stage of the cancer, your age, personal preferences, and your overall health. Talk with your health care provider about treatment options that are recommended for you. Common treatments include:  · Observation for early stage prostate cancer (active surveillance). This involves having exams, blood tests, and in some cases, more biopsies. For some men, this is the only treatment needed.  · Surgery. Types of surgeries include:  ¨ Open surgery. In this surgery, a larger incision is made to remove the prostate.  ¨ A laparoscopic prostatectomy. This is a surgery to remove the prostate and lymph nodes through several, small incisions. It is often referred to as a minimally invasive surgery.  ¨ A robotic prostatectomy. This is a surgery to remove the prostate and lymph nodes with the help of a robotic arm that is controlled by a computer.  ¨ Orchiectomy. This is a surgery to remove the testicles.  ¨ Cryosurgery. This is a surgery to freeze and destroy cancer cells.  · Radiation treatment. Types of radiation treatment include:  ¨ External beam radiation. This type aims beams of radiation from outside the body at the prostate to destroy cancerous cells.  ¨ Brachytherapy. This type uses radioactive needles, seeds, wires, or tubes that are implanted into the prostate gland. Like external beam radiation, brachytherapy destroys cancerous cells. An advantage is that this type of radiation limits the damage to surrounding  tissue and has fewer side effects.  · High-intensity, focused ultrasonography. This treatment destroys cancer cells by delivering high-energy ultrasound waves to the cancerous cells.  · Chemotherapy medicines. This treatment kills cancer cells or stops them from multiplying.  · Hormone treatment. This treatment involves taking medicines that act on one of the male hormones (testosterone):  ¨ By stopping your body from producing testosterone.  ¨ By blocking testosterone from reaching cancer cells.  Follow these instructions at home:  · Take over-the-counter and prescription medicines only as told by your health care provider.  · Maintain a healthy diet.  · Get plenty of sleep.  · Consider joining a support group for men who have prostate cancer. Meeting with a support group may help you learn to cope with the stress of having cancer.  · Keep all follow-up visits as told by your health care provider. This is important.  · If you have to go to the hospital, notify your cancer specialist (oncologist).  · Treatment for prostate cancer may affect sexual function. Continue to have intimate moments with your partner. This may include touching, holding, hugging, and caressing.  Contact a health care provider if:  · You have trouble urinating.  · You have blood in your urine.  · You have pain in your hips, back, or chest.  Get help right away if:  · You have weakness or numbness in your legs.  · You have cannot control urination or your bowel movements (incontinence).  · You have trouble breathing.  · You have sudden chest pain.  · You have chills or a fever.  Summary  · The prostate is a walnut-sized gland that is involved in the production of semen. It is located below a man's bladder, in front of the rectum. Prostate cancer is the abnormal growth of cells in the prostate gland.  · Treatment for this condition depends on several factors, including the stage of the cancer, your age, personal preferences, and your overall  health. Talk with your health care provider about treatment options that are recommended for you.  · Consider joining a support group for men who have prostate cancer. Meeting with a support group may help you learn to cope with the stress of having cancer.  This information is not intended to replace advice given to you by your health care provider. Make sure you discuss any questions you have with your health care provider.  Document Released: 12/18/2006 Document Revised: 08/29/2017 Document Reviewed: 08/28/2017  [employer] Interactive Patient Education © 2017 [employer]

## 2018-03-14 ENCOUNTER — OFFICE VISIT (OUTPATIENT)
Dept: FAMILY MEDICINE CLINIC | Facility: CLINIC | Age: 63
End: 2018-03-14

## 2018-03-14 VITALS
DIASTOLIC BLOOD PRESSURE: 98 MMHG | OXYGEN SATURATION: 98 % | SYSTOLIC BLOOD PRESSURE: 144 MMHG | BODY MASS INDEX: 21.9 KG/M2 | HEART RATE: 75 BPM | WEIGHT: 144 LBS

## 2018-03-14 DIAGNOSIS — C61 PROSTATE CANCER METASTATIC TO INTRAPELVIC LYMPH NODE (HCC): Primary | ICD-10-CM

## 2018-03-14 DIAGNOSIS — N40.1 BENIGN PROSTATIC HYPERPLASIA WITH NOCTURIA: ICD-10-CM

## 2018-03-14 DIAGNOSIS — C77.5 PROSTATE CANCER METASTATIC TO INTRAPELVIC LYMPH NODE (HCC): Primary | ICD-10-CM

## 2018-03-14 DIAGNOSIS — R97.20 ELEVATED PSA MEASUREMENT: ICD-10-CM

## 2018-03-14 DIAGNOSIS — N13.30 HYDRONEPHROSIS OF LEFT KIDNEY: ICD-10-CM

## 2018-03-14 DIAGNOSIS — R35.1 BENIGN PROSTATIC HYPERPLASIA WITH NOCTURIA: ICD-10-CM

## 2018-03-14 PROCEDURE — 99214 OFFICE O/P EST MOD 30 MIN: CPT | Performed by: NURSE PRACTITIONER

## 2018-03-14 RX ORDER — DUTASTERIDE 0.5 MG/1
0.5 CAPSULE, LIQUID FILLED ORAL DAILY
Qty: 30 CAPSULE | Refills: 11 | Status: SHIPPED | OUTPATIENT
Start: 2018-03-14 | End: 2018-01-01 | Stop reason: HOSPADM

## 2018-03-14 RX ORDER — TAMSULOSIN HYDROCHLORIDE 0.4 MG/1
1 CAPSULE ORAL NIGHTLY
Qty: 30 CAPSULE | Refills: 11 | Status: SHIPPED | OUTPATIENT
Start: 2018-03-14 | End: 2018-09-05 | Stop reason: SDUPTHER

## 2018-03-22 ENCOUNTER — HOSPITAL ENCOUNTER (OUTPATIENT)
Dept: GENERAL RADIOLOGY | Facility: HOSPITAL | Age: 63
Discharge: HOME | End: 2018-03-22
Admitting: NURSE PRACTITIONER

## 2018-03-22 ENCOUNTER — OFFICE VISIT (OUTPATIENT)
Dept: FAMILY MEDICINE CLINIC | Facility: CLINIC | Age: 63
End: 2018-03-22

## 2018-03-22 VITALS
WEIGHT: 141.8 LBS | TEMPERATURE: 98.9 F | HEART RATE: 87 BPM | DIASTOLIC BLOOD PRESSURE: 74 MMHG | BODY MASS INDEX: 21.49 KG/M2 | SYSTOLIC BLOOD PRESSURE: 126 MMHG | HEIGHT: 68 IN | OXYGEN SATURATION: 98 %

## 2018-03-22 DIAGNOSIS — R07.9 RIGHT-SIDED CHEST PAIN: Primary | ICD-10-CM

## 2018-03-22 DIAGNOSIS — R07.9 RIGHT-SIDED CHEST PAIN: ICD-10-CM

## 2018-03-22 PROCEDURE — 99214 OFFICE O/P EST MOD 30 MIN: CPT | Performed by: NURSE PRACTITIONER

## 2018-03-22 PROCEDURE — 93000 ELECTROCARDIOGRAM COMPLETE: CPT | Performed by: NURSE PRACTITIONER

## 2018-03-22 PROCEDURE — 71046 X-RAY EXAM CHEST 2 VIEWS: CPT

## 2018-03-22 NOTE — PROGRESS NOTES
"Subjective   Sadi Stone is a 62 y.o. male.   Chief Complaint   Patient presents with   • Chest Pain     right side of chest, hurts when cough, walk, or move around  SX 2-3 days       History of Present Illness   Patient is here with complaint of right side chest pain with movement x 2-3 days. There is no pain at rest, \"only with moving\" walking, even if \"laughing\". States he has not had injury or lifted anything heavy, but on Monday he went to pay license, took 3 flights of stairs up and down twice, was short of breath, had to sit down, was weak, thought he might \"pass out\", next day is when chest pain with movement started. Pain if coughs, laughs, walk or moves  Was diagnosed with prostate cancer a few months ago, had radiation treatments, also has left ureteral obstruction r/t prostate cancer.   The following portions of the patient's history were reviewed and updated as appropriate: allergies, current medications, past family history, past medical history, past social history, past surgical history and problem list.    Review of Systems   Constitutional: Positive for chills (not new occurrence), fatigue and unexpected weight change. Negative for fever.   Respiratory: Positive for cough (occ) and shortness of breath.    Cardiovascular: Positive for chest pain (right side with movement) and leg swelling (chronic left leg). Negative for palpitations.   Neurological: Negative for dizziness, light-headedness and headaches.       Objective   Physical Exam   Constitutional: He is oriented to person, place, and time. No distress.   Thin, cachectic    HENT:   Head: Normocephalic and atraumatic.   Right Ear: External ear normal.   Left Ear: External ear normal.   Mouth/Throat: Oropharynx is clear and moist. No oropharyngeal exudate.   Eyes: Conjunctivae and EOM are normal. Pupils are equal, round, and reactive to light. No scleral icterus.   Cardiovascular: Normal rate, regular rhythm, normal heart sounds and intact " distal pulses.    Pulmonary/Chest: Effort normal and breath sounds normal. No respiratory distress. He has no wheezes. He has no rales. He exhibits no tenderness.   Neurological: He is alert and oriented to person, place, and time.   Skin: Skin is warm and dry. He is not diaphoretic.   Psychiatric: He has a normal mood and affect. His behavior is normal. Judgment and thought content normal.   Vitals reviewed.      Assessment/Plan   Sadi was seen today for chest pain.    Diagnoses and all orders for this visit:    Right-sided chest pain  -     ECG 12 Lead  -     XR Chest PA & Lateral; Future          ECG 12 Lead  Date/Time: 3/22/2018 10:56 AM  Performed by: YESSI MEJIA  Authorized by: YESSI MEJIA   Comparison: compared with previous ECG from 10/14/2014  Similar to previous ECG  Rhythm: sinus rhythm  Other findings: LVH  Clinical impression: normal ECG          Chest xray to evaluate right side chest pain, appears to be musculoskeletal.  I will contact patient regarding test results and provide instructions regarding any necessary changes in plan of care.  Patient was encouraged to keep me informed of any acute changes, lack of improvement, or any new concerning symptoms.Patient voiced understanding of all instructions and denied further questions.

## 2018-03-23 ENCOUNTER — TELEPHONE (OUTPATIENT)
Dept: FAMILY MEDICINE CLINIC | Facility: CLINIC | Age: 63
End: 2018-03-23

## 2018-03-23 NOTE — TELEPHONE ENCOUNTER
----- Message from BENI Uriostegui sent at 3/22/2018  5:02 PM EDT -----  Let patient know his chest xray was normal, follow up if pain continues, he may have pulled a muscle

## 2018-04-06 ENCOUNTER — TELEPHONE (OUTPATIENT)
Dept: FAMILY MEDICINE CLINIC | Facility: CLINIC | Age: 63
End: 2018-04-06

## 2018-04-06 DIAGNOSIS — C77.5 PROSTATE CANCER METASTATIC TO INTRAPELVIC LYMPH NODE (HCC): Primary | ICD-10-CM

## 2018-04-06 DIAGNOSIS — C61 PROSTATE CANCER METASTATIC TO INTRAPELVIC LYMPH NODE (HCC): Primary | ICD-10-CM

## 2018-04-06 NOTE — TELEPHONE ENCOUNTER
TRINI WOULD LIKE A REFERRAL TO [facility] AFTER THE 04/16/2018 DATE PREFERS A Monday.  IF YOU WRITE A REFERRAL THEN WILL GET AN APPT

## 2018-04-18 ENCOUNTER — TELEPHONE (OUTPATIENT)
Dept: FAMILY MEDICINE CLINIC | Facility: CLINIC | Age: 63
End: 2018-04-18

## 2018-04-18 RX ORDER — OMEPRAZOLE 40 MG/1
40 CAPSULE, DELAYED RELEASE ORAL DAILY
Qty: 90 CAPSULE | Refills: 1 | OUTPATIENT
Start: 2018-04-18 | End: 2018-01-01 | Stop reason: SDUPTHER

## 2018-04-18 RX ORDER — OMEPRAZOLE 40 MG/1
40 CAPSULE, DELAYED RELEASE ORAL DAILY
Qty: 30 CAPSULE | Refills: 2 | Status: SHIPPED | OUTPATIENT
Start: 2018-04-18 | End: 2018-04-18 | Stop reason: SDUPTHER

## 2018-04-18 NOTE — TELEPHONE ENCOUNTER
OMEPRAZOLE 40 Mg, TAKES 1 PER DAY, 30 DAY SUPPLY WITH REFILLS     [facility] [address] IN [place]

## 2018-04-18 NOTE — TELEPHONE ENCOUNTER
E-Rx Omeprazole 40 mg capsules, take 1 capsule po qd #30, 2 refills to [facility] [address].

## 2018-04-18 NOTE — TELEPHONE ENCOUNTER
Called pharmacy and changed refill on Omeprazole 40 mg to a 90 day supply with 1 refill per patient's daughter request.

## 2018-04-23 ENCOUNTER — CONSULT (OUTPATIENT)
Dept: ONCOLOGY | Facility: CLINIC | Age: 63
End: 2018-04-23

## 2018-04-23 ENCOUNTER — LAB (OUTPATIENT)
Dept: LAB | Facility: HOSPITAL | Age: 63
End: 2018-04-23

## 2018-04-23 VITALS
DIASTOLIC BLOOD PRESSURE: 62 MMHG | HEART RATE: 61 BPM | SYSTOLIC BLOOD PRESSURE: 132 MMHG | TEMPERATURE: 97.9 F | HEIGHT: 68 IN | RESPIRATION RATE: 14 BRPM | BODY MASS INDEX: 21.98 KG/M2 | WEIGHT: 145 LBS

## 2018-04-23 DIAGNOSIS — C64.2 MALIGNANT NEOPLASM OF LEFT KIDNEY (HCC): ICD-10-CM

## 2018-04-23 DIAGNOSIS — C61 PROSTATE CANCER METASTATIC TO INTRAPELVIC LYMPH NODE (HCC): Primary | ICD-10-CM

## 2018-04-23 DIAGNOSIS — C77.5 PROSTATE CANCER METASTATIC TO INTRAPELVIC LYMPH NODE (HCC): Primary | ICD-10-CM

## 2018-04-23 DIAGNOSIS — C77.5 PROSTATE CANCER METASTATIC TO INTRAPELVIC LYMPH NODE (HCC): ICD-10-CM

## 2018-04-23 DIAGNOSIS — C64.2 MALIGNANT NEOPLASM OF LEFT KIDNEY (HCC): Primary | ICD-10-CM

## 2018-04-23 DIAGNOSIS — C61 PROSTATE CANCER METASTATIC TO INTRAPELVIC LYMPH NODE (HCC): ICD-10-CM

## 2018-04-23 LAB
BACTERIA UR QL AUTO: ABNORMAL /HPF
BILIRUB UR QL STRIP: NEGATIVE
CLARITY UR: ABNORMAL
COLOR UR: YELLOW
GLUCOSE UR STRIP-MCNC: NEGATIVE MG/DL
HGB UR QL STRIP.AUTO: ABNORMAL
HYALINE CASTS UR QL AUTO: ABNORMAL /LPF
KETONES UR QL STRIP: NEGATIVE
LEUKOCYTE ESTERASE UR QL STRIP.AUTO: ABNORMAL
NITRITE UR QL STRIP: NEGATIVE
PH UR STRIP.AUTO: 6 [PH] (ref 5–8)
PROT UR QL STRIP: ABNORMAL
RBC # UR: ABNORMAL /HPF
REF LAB TEST METHOD: ABNORMAL
SP GR UR STRIP: 1.02 (ref 1–1.03)
SQUAMOUS #/AREA URNS HPF: ABNORMAL /HPF
UROBILINOGEN UR QL STRIP: ABNORMAL
WBC UR QL AUTO: ABNORMAL /HPF

## 2018-04-23 PROCEDURE — 87086 URINE CULTURE/COLONY COUNT: CPT

## 2018-04-23 PROCEDURE — 99205 OFFICE O/P NEW HI 60 MIN: CPT | Performed by: INTERNAL MEDICINE

## 2018-04-23 PROCEDURE — 81001 URINALYSIS AUTO W/SCOPE: CPT

## 2018-04-23 RX ORDER — HYDROCODONE BITARTRATE AND ACETAMINOPHEN 5; 325 MG/1; MG/1
1 TABLET ORAL EVERY 6 HOURS PRN
Qty: 30 TABLET | Refills: 0 | Status: SHIPPED | OUTPATIENT
Start: 2018-04-23 | End: 2018-04-30

## 2018-04-23 RX ORDER — CIPROFLOXACIN 500 MG/1
500 TABLET, FILM COATED ORAL 2 TIMES DAILY
Qty: 20 TABLET | Refills: 0 | Status: SHIPPED | OUTPATIENT
Start: 2018-04-23 | End: 2018-05-03

## 2018-04-23 NOTE — PROGRESS NOTES
ID: 63 y.o. year old male from [facility] [id number]    PCP: BENI Hernandez    REFERRING PHYSICIAN:  Mr. 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 Danielle,     It is a pleasure to meet Mr. Stone today.  As you know he is a very pleasant 63-year-old gentleman of -American heritage who presents today for consultation regarding stage Alejandra adenocarcinoma of the prostate.  He presented with some abdominal pain to the ER. He also has Left lower extremity edema.  At which time a CAT scan of his abdomen was performed which showed a large prostate mass with regional adenopathy.  He subsequently underwent a biopsy which showed a 5+5 equal 10 poorly differentiated adenocarcinoma.  His PSA was nonelevated at 4.  He reports he underwent a bone scan and a PET scan which did not show any distant disease.  He subsequently underwent radiotherapy at the [facility] and completed his treatment in March 2018.  He has a stent due to hydronephrosis of the left side.  He recently underwent a left stent replacement and is complaining of vague lower back and groin pain since that procedure.  He presents today as a second opinion going forward.  He otherwise seems to be in reasonable health with no significant medical issues other than his prostate cancer.      Past Medical History:   Diagnosis Date   • Arthritis    • Glaucoma    • Peptic ulceration    • Prostate cancer 02/2018   • Ureteral obstruction, left 02/2018    due to metastatic prostate cancer       Past Surgical History:   Procedure Laterality Date   • KIDNEY SURGERY Left     removed a spot    • KNEE SURGERY     • SHOULDER SURGERY         Social History     Social History   • Marital status:      Social History Main Topics   • Smoking status: Never Smoker   • Smokeless tobacco: Never Used   • Alcohol use No   • Drug use: No   • Sexual activity: Not Currently     Other Topics  Concern   • Not on file       Family History   Problem Relation Age of Onset   • Cancer Mother    • Cancer Father    • Cancer Sister    • No Known Problems Brother    • No Known Problems Daughter    • No Known Problems Brother    • No Known Problems Sister        Review of Systems:    16 point review of systems was performed and reviewed and scanned into the EMR      Current Outpatient Prescriptions:   •  ALPHAGAN P 0.1 % solution ophthalmic solution, , Disp: , Rfl:   •  aspirin 81 MG EC tablet, Take 81 mg by mouth Daily., Disp: , Rfl:   •  ciprofloxacin (CIPRO) 500 MG tablet, Take 1 tablet by mouth 2 (Two) Times a Day for 10 days. 1 tablet, Disp: 20 tablet, Rfl: 0  •  diphenhydrAMINE (BENADRYL) 25 mg capsule, Take 1 capsule by mouth Every 6 (Six) Hours As Needed for Itching (qhs for itching)., Disp: 30 capsule, Rfl: 0  •  dutasteride (AVODART) 0.5 MG capsule, Take 1 capsule by mouth Daily., Disp: 30 capsule, Rfl: 11  •  HYDROcodone-acetaminophen (NORCO) 5-325 MG per tablet, Take 1 tablet by mouth Every 6 (Six) Hours As Needed (pain)., Disp: 30 tablet, Rfl: 0  •  HYDROcodone-acetaminophen (NORCO) 5-325 MG per tablet, Take 1 tablet by mouth Every 6 (Six) Hours As Needed for pain, Disp: 30 tablet, Rfl: 0  •  loratadine (CLARITIN) 10 MG tablet, Take 1 tablet by mouth Every Morning., Disp: 30 tablet, Rfl: 0  •  Multiple Vitamins-Minerals (MULTIVITAMIN ADULT PO), Take  by mouth., Disp: , Rfl:   •  omeprazole (priLOSEC) 40 MG capsule, Take 1 capsule by mouth Daily., Disp: 90 capsule, Rfl: 1  •  tamsulosin (FLOMAX) 0.4 MG capsule 24 hr capsule, Take 1 capsule by mouth Every Night., Disp: 30 capsule, Rfl: 11    Allergies   Allergen Reactions   • Bactrim [Sulfamethoxazole-Trimethoprim] Anaphylaxis       ECOG SCORE: 0    Objective     Vitals:    04/23/18 1116   BP: 132/62   Pulse: 61   Resp: 14   Temp: 97.9 °F (36.6 °C)       Physical Exam     General: well appearing, in no acute distress  HEENT: sclera anicteric, oropharynx  clear, neck is supple  Lymphatics: no cervical, supraclavicular, or axillary adenopathy  Cardiovascular: regular rate and rhythm, no murmurs, rubs or gallops  Lungs: clear to auscultation bilaterally  Abdomen: soft, nontender, nondistended.  No palpable organomegaly  Extremities: +1 edema on the left leg  Skin: no rashes, lesions, bruising, or petechiae  Msk:  Shows no weakness of the large muscle groups  Psych: Mood is stable      Lab Results   Component Value Date    GLUCOSE 86 10/06/2017    BUN 23 10/06/2017    CREATININE 1.50 (H) 10/06/2017     10/06/2017    K 4.9 10/06/2017     10/06/2017    CO2 33.0 (H) 10/06/2017    CALCIUM 9.3 10/06/2017    PROTEINTOT 7.8 10/06/2017    ALBUMIN 3.80 10/06/2017    BILITOT 0.8 10/06/2017    ALKPHOS 98 10/06/2017    AST 35 (H) 10/06/2017    ALT 16 10/06/2017           Lab Results   Component Value Date    PSA 4.850 (H) 10/06/2017    PSA 4.410 (H) 08/29/2017         ASSESSMENT:    63-year-old gentleman with stage Alejandra poorly differentiated adenocarcinoma of the prostate with pelvic adenopathy.  He has completed radiotherapy with Dr. Rosas and is receiving every 6 months of Eligard with Dr. Sellers.  Per his report he has no other sites of disease.  His PSA has dropped below 1 at this time.  There are 2 studies that were recently published that gives us some recommendations.  Both resulted in significant improvement in overall survival.  The option for castration naïve patients to be given 6 cycles of Taxotere upfront or placing them on Zytiga with prednisone upfront along with the Eligard both of which resulted in almost a 1 year survival advantage.  The National Comprehensive Cancer Network gives both regimens a category 1 rating in their guidelines.  With his disease being poorly differentiated with minimal PSA production I think 6 cycles of Taxotere upfront would be a reasonable thought in this situation.  If he does have bony metastases then the addition of  Xgeva in this setting would also be helpful.  I like to discuss his case in tumor board before we decide if he should consider Taxotere or Zytiga upfront.  At this time he is having some issues with lower back pain and groin pain likely secondary to a urinary tract infection after his recent instrumentation.  I will check a UA with reflex culture and also place him on Cipro for 10 days.  I like to see him back in my clinic in 1 week to go over the recommendations of the tumor board and answer any further questions he may have for me.      Thank you for allowing me to participate in the care of this patient.    Yours sincerely,    Jolynn Dao MD  [facility]  Hematology and Oncology        Please note that portions of this note may have been completed with a voice recognition program. Efforts were made to edit the dictations, but occasionally words are mistranscribed.

## 2018-04-25 LAB
BACTERIA SPEC AEROBE CULT: NORMAL
BACTERIA SPEC AEROBE CULT: NORMAL

## 2018-04-30 ENCOUNTER — OFFICE VISIT (OUTPATIENT)
Dept: ONCOLOGY | Facility: CLINIC | Age: 63
End: 2018-04-30

## 2018-04-30 ENCOUNTER — EDUCATION (OUTPATIENT)
Dept: ONCOLOGY | Facility: HOSPITAL | Age: 63
End: 2018-04-30

## 2018-04-30 VITALS
HEIGHT: 68 IN | WEIGHT: 143 LBS | SYSTOLIC BLOOD PRESSURE: 150 MMHG | DIASTOLIC BLOOD PRESSURE: 70 MMHG | TEMPERATURE: 98.1 F | RESPIRATION RATE: 13 BRPM | BODY MASS INDEX: 21.67 KG/M2 | HEART RATE: 94 BPM

## 2018-04-30 DIAGNOSIS — C64.2 MALIGNANT NEOPLASM OF LEFT KIDNEY (HCC): ICD-10-CM

## 2018-04-30 DIAGNOSIS — C77.5 PROSTATE CANCER METASTATIC TO INTRAPELVIC LYMPH NODE (HCC): Primary | ICD-10-CM

## 2018-04-30 DIAGNOSIS — C61 PROSTATE CANCER METASTATIC TO INTRAPELVIC LYMPH NODE (HCC): Primary | ICD-10-CM

## 2018-04-30 PROCEDURE — 99214 OFFICE O/P EST MOD 30 MIN: CPT | Performed by: INTERNAL MEDICINE

## 2018-04-30 RX ORDER — DEXAMETHASONE 4 MG/1
TABLET ORAL
Qty: 12 TABLET | Refills: 5 | Status: SHIPPED | OUTPATIENT
Start: 2018-04-30 | End: 2018-09-05

## 2018-04-30 RX ORDER — SODIUM CHLORIDE 9 MG/ML
250 INJECTION, SOLUTION INTRAVENOUS ONCE
Status: CANCELLED | OUTPATIENT
Start: 2018-05-07

## 2018-04-30 RX ORDER — ONDANSETRON HYDROCHLORIDE 8 MG/1
8 TABLET, FILM COATED ORAL 3 TIMES DAILY PRN
Qty: 30 TABLET | Refills: 5 | Status: SHIPPED | OUTPATIENT
Start: 2018-04-30 | End: 2018-01-01

## 2018-04-30 RX ORDER — HYDROCODONE BITARTRATE AND ACETAMINOPHEN 10; 325 MG/1; MG/1
1 TABLET ORAL EVERY 6 HOURS PRN
Qty: 60 TABLET | Refills: 0 | Status: SHIPPED | OUTPATIENT
Start: 2018-04-30 | End: 2018-09-12 | Stop reason: SDUPTHER

## 2018-04-30 NOTE — PROGRESS NOTES
"     Prostate cancer metastatic to intrapelvic lymph node    12/13/2017 Initial Diagnosis     Prostate cancer metastatic to intrapelvic lymph node       1/30/2018 -  Hormonal Therapy     Received Julia with Dr. Sellers          - 1/30/2018 Radiation     Radiation OncologyTreatment Course:  Sadi Stone Jr. received 45 cGy in 18 fractions with Dr. Rosas at [facility]            REASON FOR VISIT: Metastatic Prostate Cancer    HISTORY OF PRESENT ILLNESS:   63 y.o.  male presents today for Follow-up of his locally advanced prostate cancer.  He is currently completed palliative radiation to the pelvis along with receiving Eligard with Dr. Sellers.  He also had recent stent placed due to hydronephrosis.  Still has significant groin pain in his left side.  It's unclear if this is related to his stent for disease.  I presented him at tumor board where the recommendation was to consider upfront Taxotere in this setting.  The NCCN guidelines gives this a category 1 recommendation in this setting.  Otherwise he seems to be in reasonable health.    Past medical history, social history and family history was reviewed and unchanged from prior visit.    Review of Systems:    Review of Systems - Oncology   A comprehensive 14 point review of systems was performed and was negative except as mentioned.      Medications:  The current medication list was reviewed in the EMR    ALLERGIES:    Allergies   Allergen Reactions   • Bactrim [Sulfamethoxazole-Trimethoprim] Anaphylaxis         Physical Exam    VITAL SIGNS:  /70   Pulse 94   Temp 98.1 °F (36.7 °C) (Oral)   Resp 13   Ht 172.7 cm (68\")   Wt 64.9 kg (143 lb)   BMI 21.74 kg/m²     Wt Readings from Last 3 Encounters:   04/30/18 64.9 kg (143 lb)   04/23/18 65.8 kg (145 lb)   03/22/18 64.3 kg (141 lb 12.8 oz)        Performance Status: 0    General: well appearing, in no acute distress  HEENT: sclera anicteric, oropharynx clear, neck is supple  Lymphatics: no " cervical, supraclavicular, or axillary adenopathy  Cardiovascular: regular rate and rhythm, no murmurs, rubs or gallops  Lungs: clear to auscultation bilaterally  Abdomen: soft, nontender, nondistended.  No palpable organomegaly  Extremities: no lower extremity edema  Skin: no rashes, lesions, bruising, or petechiae  Msk:  Shows no weakness of the large muscle groups  Psych: Mood is stable            Assessment/Plan    1.  Locally advanced metastatic prostate cancer: Continue Richi with Dr. Sellers.  I will get a CAT scan of his chest abdomen and pelvis with a bone scan as baseline tests.  I will also start him on Taxotere next week to complete 6 cycles at 75 mg/m² given every 3 weeks.  We discussed the common side effects to be expected from the medication including the steroid regimen to be given the day before the day of and the day after treatment.    2.  Pelvic pain: Unclear if this is related to his disease or the stents.  I'm going to place him on Norco 10 to help with the pain.      Return to clinic in 4 weeks for cycle #2 of Taxotere.      I spent 25 minutes on the patient's plan and care with over 50% spent counseling the patient regarding the recommendations of the tumor board and treatment going forward.        Jolynn Dao MD  [facility] Hematology and Oncology    4/30/2018         Please note that portions of this note may have been completed with a voice recognition program. Efforts were made to edit the dictations, but occasionally words are mistranscribed.

## 2018-04-30 NOTE — PLAN OF CARE
Outpatient Infusion • [address] • [address] • [place] [id number] • [phone]      CHEMOTHERAPY EDUCATION SHEET    NAME:  Sadi Stone      : 1955           DATE: 18    Booklets Given: Chemotherapy and You [x]  Eating Hints [x]    Sexuality/Fertility Books []     Chemotherapy/Biotherapy Education Sheets: (list all that apply)  Docetaxel                                                                                                                                                                Chemotherapy Regimen:  Docetaxel every 21 days x 6 cycles    TOPICS EDUCATION PROVIDED EDUCATION REINFORCED COMMENTS   ANEMIA:  role of RBC, cause, s/s, ways to manage, role of transfusion [x] [] Discussed risk of anemia and encouraged to stay active.   THROMBOCYTOPENIA:  role of platelet, cause, s/s, ways to prevent bleeding, things to avoid, when to seek help [x] [] Discussed risk of bruising and bleeding.   NEUTROPENIA:  role of WBC, cause, infection precautions, s/s of infection, when to call MD [x] [] Discussed risk of infection and when to notify MD office; disucssed importance of hand washing.  Patient likes to hug and shake everyone's hand - discussed using hand  all the time.   NUTRITION & APPETITE CHANGES:  importance of maintaining healthy diet & weight, ways to manage to improve intake, dietary consult, exercise regimen [x] [] Discussed risk of decreased appetite.  Advised patient would meet a dietician during first treatment.   DIARRHEA:  causes, s/s of dehydration, ways to manage, dietary changes, when to call MD [x] [] Recommended OTC Immodium if needed.   CONSTIPATION:  causes, ways to manage, dietary changes, when to call MD [x] [] Recommended stool softner and Miralax if needed   NAUSEA & VOMITING:  cause, use of antiemetics, dietary changes, when to call MD [x] [] Discussed pre-meds and at home meds-when and how to take.   MOUTH SORES:  causes, oral care, ways to  manage [x] [] Discussed preventive mouthwash with salt/baking soda/water and to notify MD office if sores develop.  Discussed using a soft brissle toothbrush and flossing.   ALOPECIA:  cause, ways to manage, resources [x] []    INFERTILITY & SEXUALITY:  causes, fertility preservation options, sexuality changes, ways to manage, importance of birth control [x] [] Discussed safe sex practices.   NERVOUS SYSTEM CHANGES:  causes, s/s, neuropathies, cognitive changes, ways to manage [x] [] Patient has neuropathy in 2 fingers - discussed monitoring closely.   PAIN:  causes, ways to manage [] [] ????   SKIN & NAIL CHANGES:  cause, s/s, ways to manage [] []    ORGAN TOXICITIES:  cause, s/s, need for diagnostic tests, labs, when to notify MD [x] [] Discussed labs to monitor and wait time associated.   SURVIVORSHIP:  distress, distress assessment, secondary malignancies, early/late effects, follow-up, social issues, social support [] []    HOME CARE:  use of spill kits, storing of PO chemo, how to manage bodily fluids [x] [] Discussed washing clothes with others and recommended wearing gloves if patient vomits to clean.   MISCELLANEOUS:  drug interactions, administration, vesicant, et [x] [] Discussed 1 hour length of infusion and frequency of 21 days.  Discussed taking dexamethasone 8mg BID the day before, day of and day after chemotherapy - dicussed indication.     Referrals:        Notes:   Provided pt with my business card and advised to call with any questions/concerns.

## 2018-05-03 ENCOUNTER — HOSPITAL ENCOUNTER (OUTPATIENT)
Dept: NUCLEAR MEDICINE | Facility: HOSPITAL | Age: 63
Discharge: HOME | End: 2018-05-03
Attending: INTERNAL MEDICINE

## 2018-05-03 ENCOUNTER — HOSPITAL ENCOUNTER (OUTPATIENT)
Dept: CT IMAGING | Facility: HOSPITAL | Age: 63
Discharge: HOME | End: 2018-05-03
Attending: INTERNAL MEDICINE

## 2018-05-03 DIAGNOSIS — C64.2 MALIGNANT NEOPLASM OF LEFT KIDNEY (HCC): ICD-10-CM

## 2018-05-03 DIAGNOSIS — C61 PROSTATE CANCER METASTATIC TO INTRAPELVIC LYMPH NODE (HCC): ICD-10-CM

## 2018-05-03 DIAGNOSIS — C77.5 PROSTATE CANCER METASTATIC TO INTRAPELVIC LYMPH NODE (HCC): ICD-10-CM

## 2018-05-03 PROCEDURE — 82565 ASSAY OF CREATININE: CPT

## 2018-05-03 PROCEDURE — 71260 CT THORAX DX C+: CPT

## 2018-05-03 PROCEDURE — 25010000002 IOPAMIDOL 61 % SOLUTION: Performed by: INTERNAL MEDICINE

## 2018-05-03 PROCEDURE — A9503 TC99M MEDRONATE: HCPCS | Performed by: INTERNAL MEDICINE

## 2018-05-03 PROCEDURE — 0 TECHNETIUM MEDRONATE KIT: Performed by: INTERNAL MEDICINE

## 2018-05-03 PROCEDURE — 78306 BONE IMAGING WHOLE BODY: CPT

## 2018-05-03 PROCEDURE — 74177 CT ABD & PELVIS W/CONTRAST: CPT

## 2018-05-03 RX ORDER — TC 99M MEDRONATE 20 MG/10ML
24.9 INJECTION, POWDER, LYOPHILIZED, FOR SOLUTION INTRAVENOUS
Status: COMPLETED | OUTPATIENT
Start: 2018-05-03 | End: 2018-05-03

## 2018-05-03 RX ADMIN — Medication 24.9 MILLICURIE: at 10:15

## 2018-05-03 RX ADMIN — IOPAMIDOL 85 ML: 612 INJECTION, SOLUTION INTRAVENOUS at 11:00

## 2018-05-03 RX ADMIN — BARIUM SULFATE 450 ML: 21 SUSPENSION ORAL at 10:00

## 2018-05-04 LAB — CREAT BLDA-MCNC: 1.3 MG/DL (ref 0.6–1.3)

## 2018-05-07 ENCOUNTER — DOCUMENTATION (OUTPATIENT)
Dept: NUTRITION | Facility: HOSPITAL | Age: 63
End: 2018-05-07

## 2018-05-07 ENCOUNTER — INFUSION (OUTPATIENT)
Dept: ONCOLOGY | Facility: HOSPITAL | Age: 63
End: 2018-05-07

## 2018-05-07 VITALS
WEIGHT: 145.5 LBS | DIASTOLIC BLOOD PRESSURE: 82 MMHG | BODY MASS INDEX: 22.12 KG/M2 | TEMPERATURE: 98 F | HEART RATE: 77 BPM | SYSTOLIC BLOOD PRESSURE: 131 MMHG

## 2018-05-07 DIAGNOSIS — C77.5 PROSTATE CANCER METASTATIC TO INTRAPELVIC LYMPH NODE (HCC): Primary | ICD-10-CM

## 2018-05-07 DIAGNOSIS — C61 PROSTATE CANCER METASTATIC TO INTRAPELVIC LYMPH NODE (HCC): Primary | ICD-10-CM

## 2018-05-07 LAB
ALBUMIN SERPL-MCNC: 3.5 G/DL (ref 3.2–4.8)
ALBUMIN/GLOB SERPL: 1 G/DL (ref 1.5–2.5)
ALP SERPL-CCNC: 116 U/L (ref 25–100)
ALT SERPL W P-5'-P-CCNC: 38 U/L (ref 7–40)
ANION GAP SERPL CALCULATED.3IONS-SCNC: 7 MMOL/L (ref 3–11)
AST SERPL-CCNC: 45 U/L (ref 0–33)
BILIRUB SERPL-MCNC: 0.4 MG/DL (ref 0.3–1.2)
BUN BLD-MCNC: 30 MG/DL (ref 9–23)
BUN/CREAT SERPL: 25 (ref 7–25)
CALCIUM SPEC-SCNC: 8.7 MG/DL (ref 8.7–10.4)
CHLORIDE SERPL-SCNC: 107 MMOL/L (ref 99–109)
CO2 SERPL-SCNC: 26 MMOL/L (ref 20–31)
CREAT BLD-MCNC: 1.2 MG/DL (ref 0.6–1.3)
ERYTHROCYTE [DISTWIDTH] IN BLOOD BY AUTOMATED COUNT: 16.6 % (ref 11.3–14.5)
GFR SERPL CREATININE-BSD FRML MDRD: 74 ML/MIN/1.73
GLOBULIN UR ELPH-MCNC: 3.5 GM/DL
GLUCOSE BLD-MCNC: 126 MG/DL (ref 70–100)
HCT VFR BLD AUTO: 26.5 % (ref 38.9–50.9)
HGB BLD-MCNC: 8.4 G/DL (ref 13.1–17.5)
LYMPHOCYTES # BLD AUTO: 1.5 10*3/MM3 (ref 0.6–4.8)
LYMPHOCYTES NFR BLD AUTO: 12.5 % (ref 24–44)
MCH RBC QN AUTO: 28.8 PG (ref 27–31)
MCHC RBC AUTO-ENTMCNC: 31.6 G/DL (ref 32–36)
MCV RBC AUTO: 91.3 FL (ref 80–99)
MONOCYTES # BLD AUTO: 0.4 10*3/MM3 (ref 0–1)
MONOCYTES NFR BLD AUTO: 3.5 % (ref 0–12)
NEUTROPHILS # BLD AUTO: 10 10*3/MM3 (ref 1.5–8.3)
NEUTROPHILS NFR BLD AUTO: 84 % (ref 41–71)
PLATELET # BLD AUTO: 314 10*3/MM3 (ref 150–450)
PMV BLD AUTO: 6.6 FL (ref 6–12)
POTASSIUM BLD-SCNC: 4.8 MMOL/L (ref 3.5–5.5)
PROT SERPL-MCNC: 7 G/DL (ref 5.7–8.2)
PSA SERPL-MCNC: 0.48 NG/ML (ref 0–4)
RBC # BLD AUTO: 2.91 10*6/MM3 (ref 4.2–5.76)
SODIUM BLD-SCNC: 140 MMOL/L (ref 132–146)
WBC NRBC COR # BLD: 11.9 10*3/MM3 (ref 3.5–10.8)

## 2018-05-07 PROCEDURE — 80053 COMPREHEN METABOLIC PANEL: CPT

## 2018-05-07 PROCEDURE — 25010000002 DOCETAXEL 20 MG/ML CONCENTRATION 4 ML VIAL: Performed by: INTERNAL MEDICINE

## 2018-05-07 PROCEDURE — 96413 CHEMO IV INFUSION 1 HR: CPT

## 2018-05-07 PROCEDURE — 25010000002 DOCETAXEL 20 MG/ML CONCENTRATION 1 ML VIAL: Performed by: INTERNAL MEDICINE

## 2018-05-07 PROCEDURE — 85025 COMPLETE CBC W/AUTO DIFF WBC: CPT

## 2018-05-07 PROCEDURE — 84153 ASSAY OF PSA TOTAL: CPT

## 2018-05-07 RX ORDER — SODIUM CHLORIDE 9 MG/ML
250 INJECTION, SOLUTION INTRAVENOUS ONCE
Status: COMPLETED | OUTPATIENT
Start: 2018-05-07 | End: 2018-05-07

## 2018-05-07 RX ADMIN — SODIUM CHLORIDE 250 ML: 9 INJECTION, SOLUTION INTRAVENOUS at 09:57

## 2018-05-07 RX ADMIN — DOCETAXEL 135 MG: 80 INJECTION, SOLUTION, CONCENTRATE INTRAVENOUS at 09:59

## 2018-05-24 ENCOUNTER — TELEPHONE (OUTPATIENT)
Dept: ONCOLOGY | Facility: CLINIC | Age: 63
End: 2018-05-24

## 2018-05-24 NOTE — TELEPHONE ENCOUNTER
----- Message from Radha Painter Rep sent at 5/24/2018 11:50 AM EDT -----  Regarding: Andre-Call  Contact: [phone]  Pt.'s wife Preston called and said that pt is having pain in his ankle. Wants to know if he can take Tylenol.

## 2018-05-24 NOTE — TELEPHONE ENCOUNTER
Returned call talked to patient, and discussed that he can take tylenol for mild pain.  Patient verbalized understanding

## 2018-05-25 ENCOUNTER — OFFICE VISIT (OUTPATIENT)
Dept: FAMILY MEDICINE CLINIC | Facility: CLINIC | Age: 63
End: 2018-05-25

## 2018-05-25 ENCOUNTER — TRANSCRIBE ORDERS (OUTPATIENT)
Dept: ADMINISTRATIVE | Facility: HOSPITAL | Age: 63
End: 2018-05-25

## 2018-05-25 ENCOUNTER — HOSPITAL ENCOUNTER (OUTPATIENT)
Dept: GENERAL RADIOLOGY | Facility: HOSPITAL | Age: 63
Discharge: HOME | End: 2018-05-25
Admitting: NURSE PRACTITIONER

## 2018-05-25 VITALS
WEIGHT: 148.8 LBS | OXYGEN SATURATION: 98 % | SYSTOLIC BLOOD PRESSURE: 142 MMHG | HEART RATE: 84 BPM | DIASTOLIC BLOOD PRESSURE: 82 MMHG | BODY MASS INDEX: 22.55 KG/M2 | HEIGHT: 68 IN

## 2018-05-25 DIAGNOSIS — M76.60 ACHILLES TENDINITIS, UNSPECIFIED LATERALITY: ICD-10-CM

## 2018-05-25 DIAGNOSIS — M76.60 ACHILLES TENDINITIS, UNSPECIFIED LATERALITY: Primary | ICD-10-CM

## 2018-05-25 DIAGNOSIS — M76.60 PAIN IN ACHILLES TENDON: Primary | ICD-10-CM

## 2018-05-25 PROCEDURE — 73610 X-RAY EXAM OF ANKLE: CPT

## 2018-05-25 PROCEDURE — 99214 OFFICE O/P EST MOD 30 MIN: CPT | Performed by: NURSE PRACTITIONER

## 2018-05-25 NOTE — PROGRESS NOTES
Subjective   Sadi Stone Jr. is a 63 y.o. male.     History of Present Illness   Patient is here with complaint of right foot pain that started yesterday morning, denies injury, has tried tylenol, but did not take the hydrocodone prescribed by oncologist,  patient has metastatic prostate cancer to intrapelvic lymph node, he is followed by Dr. Dao, oncology; and is scheduled to see him 5/30/18. He has also seen Dr. Geo Sellers, urologist, patient is receiving chemotherapy.  States he mowed the grass Tues, wore boots that have caused pain in heel in the past, thinks this may be related.     The following portions of the patient's history were reviewed and updated as appropriate: allergies, current medications, past family history, past medical history, past social history, past surgical history and problem list.    Review of Systems   Constitutional: Positive for activity change.   Musculoskeletal: Positive for arthralgias and myalgias. Negative for gait problem and joint swelling.   Skin: Negative.    Neurological: Negative for weakness and numbness.       Objective   Physical Exam   Constitutional: He is oriented to person, place, and time. No distress.   Thin, cachectic    HENT:   Head: Normocephalic and atraumatic.   Right Ear: External ear normal.   Left Ear: External ear normal.   Mouth/Throat: Oropharynx is clear and moist. No oropharyngeal exudate.   Eyes: Conjunctivae and EOM are normal. Pupils are equal, round, and reactive to light. No scleral icterus.   Cardiovascular: Normal rate, regular rhythm, normal heart sounds and intact distal pulses.    Pulmonary/Chest: Effort normal and breath sounds normal. No respiratory distress. He has no wheezes. He has no rales. He exhibits no tenderness.   Musculoskeletal: He exhibits tenderness. He exhibits no edema or deformity.   Pain with palpation of achilles tendon and heel, pain with dorsiflexion and plantarflexion, reduced ROM     Neurological: He  is alert and oriented to person, place, and time.   Skin: Skin is warm and dry. He is not diaphoretic.   Psychiatric: He has a normal mood and affect. His behavior is normal. Judgment and thought content normal.   Vitals reviewed.      Assessment/Plan   Sadi was seen today for leg pain.    Diagnoses and all orders for this visit:    Pain in Achilles tendon  -     XR Ankle 3+ View Right; Future      Xray ordered to evaluate pain, appears to be tendonitis, printed instructions for Achilles tendonitis management provided today, advised alternating heat and ice, may take the prescription pain med that the oncologist prescribed.   Patient was encouraged to keep me informed of any acute changes, lack of improvement, or any new concerning symptoms.Patient voiced understanding of all instructions and denied further questions.  Also provided information on palliative care due to metastatic cancer and chemotherapy, patient denies cancer associated pain at this time, but explained these services are available should the patient need them, he would just need to let PCP know.

## 2018-05-25 NOTE — PATIENT INSTRUCTIONS
Achilles Tendinitis With Rehab  Achilles tendinitis is a disorder of the Achilles tendon. The Achilles tendon connects the large calf muscles (Gastrocnemius and Soleus) to the heel bone (calcaneus). This tendon is sometimes called the heel cord. It is important for pushing-off and standing on your toes and is important for walking, running, or jumping. Tendinitis is often caused by overuse and repetitive microtrauma.  SYMPTOMS  · Pain, tenderness, swelling, warmth, and redness may occur over the Achilles tendon even at rest.  · Pain with pushing off, or flexing or extending the ankle.  · Pain that is worsened after or during activity.  CAUSES   · Overuse sometimes seen with rapid increase in exercise programs or in sports requiring running and jumping.  · Poor physical conditioning (strength and flexibility or endurance).  · Running sports, especially training running down hills.  · Inadequate warm-up before practice or play or failure to stretch before participation.  · Injury to the tendon.  PREVENTION   · Warm up and stretch before practice or competition.  · Allow time for adequate rest and recovery between practices and competition.  · Keep up conditioning.    Keep up ankle and leg flexibility.    Improve or keep muscle strength and endurance.    Improve cardiovascular fitness.  · Use proper technique.  · Use proper equipment (shoes, skates).  · To help prevent recurrence, taping, protective strapping, or an adhesive bandage may be recommended for several weeks after healing is complete.  PROGNOSIS   · Recovery may take weeks to several months to heal.  · Longer recovery is expected if symptoms have been prolonged.  · Recovery is usually quicker if the inflammation is due to a direct blow as compared with overuse or sudden strain.  RELATED COMPLICATIONS   · Healing time will be prolonged if the condition is not correctly treated. The injury must be given plenty of time to heal.  · Symptoms can reoccur if  activity is resumed too soon.  · Untreated, tendinitis may increase the risk of tendon rupture requiring additional time for recovery and possibly surgery.  TREATMENT   · The first treatment consists of rest anti-inflammatory medication, and ice to relieve the pain.  · Stretching and strengthening exercises after resolution of pain will likely help reduce the risk of recurrence. Referral to a physical therapist or  for further evaluation and treatment may be helpful.  · A walking boot or cast may be recommended to rest the Achilles tendon. This can help break the cycle of inflammation and microtrauma.  · Arch supports (orthotics) may be prescribed or recommended by your caregiver as an adjunct to therapy and rest.  · Surgery to remove the inflamed tendon lining or degenerated tendon tissue is rarely necessary and has shown less than predictable results.  MEDICATION   · Nonsteroidal anti-inflammatory medications, such as aspirin and ibuprofen, may be used for pain and inflammation relief. Do not take within 7 days before surgery. Take these as directed by your caregiver. Contact your caregiver immediately if any bleeding, stomach upset, or signs of allergic reaction occur. Other minor pain relievers, such as acetaminophen, may also be used.  · Pain relievers may be prescribed as necessary by your caregiver. Do not take prescription pain medication for longer than 4 to 7 days. Use only as directed and only as much as you need.  · Cortisone injections are rarely indicated. Cortisone injections may weaken tendons and predispose to rupture. It is better to give the condition more time to heal than to use them.  HEAT AND COLD  · Cold is used to relieve pain and reduce inflammation for acute and chronic Achilles tendinitis. Cold should be applied for 10 to 15 minutes every 2 to 3 hours for inflammation and pain and immediately after any activity that aggravates your symptoms. Use ice packs or an ice  massage.  · Heat may be used before performing stretching and strengthening activities prescribed by your caregiver. Use a heat pack or a warm soak.  SEEK MEDICAL CARE IF:  · Symptoms get worse or do not improve in 2 weeks despite treatment.  · New, unexplained symptoms develop. Drugs used in treatment may produce side effects.  EXERCISES  RANGE OF MOTION (ROM) AND STRETCHING EXERCISES - Achilles Tendinitis   These exercises may help you when beginning to rehabilitate your injury. Your symptoms may resolve with or without further involvement from your physician, physical therapist or . While completing these exercises, remember:   · Restoring tissue flexibility helps normal motion to return to the joints. This allows healthier, less painful movement and activity.  · An effective stretch should be held for at least 30 seconds.  · A stretch should never be painful. You should only feel a gentle lengthening or release in the stretched tissue.  STRETCH - Gastroc, Standing   · Place hands on wall.  · Extend right / left leg, keeping the front knee somewhat bent.  · Slightly point your toes inward on your back foot.  · Keeping your right / left heel on the floor and your knee straight, shift your weight toward the wall, not allowing your back to arch.  · You should feel a gentle stretch in the right / left calf. Hold this position for __________ seconds.  Repeat __________ times. Complete this stretch __________ times per day.  STRETCH - Soleus, Standing   · Place hands on wall.  · Extend right / left leg, keeping the other knee somewhat bent.  · Slightly point your toes inward on your back foot.  · Keep your right / left heel on the floor, bend your back knee, and slightly shift your weight over the back leg so that you feel a gentle stretch deep in your back calf.  · Hold this position for __________ seconds.  Repeat __________ times. Complete this stretch __________ times per day.  STRETCH -  Gastrocsoleus, Standing   Note: This exercise can place a lot of stress on your foot and ankle. Please complete this exercise only if specifically instructed by your caregiver.   · Place the ball of your right / left foot on a step, keeping your other foot firmly on the same step.  · Hold on to the wall or a rail for balance.  · Slowly lift your other foot, allowing your body weight to press your heel down over the edge of the step.  · You should feel a stretch in your right / left calf.  · Hold this position for __________ seconds.  · Repeat this exercise with a slight bend in your knee.  Repeat __________ times. Complete this stretch __________ times per day.   STRENGTHENING EXERCISES - Achilles Tendinitis  These exercises may help you when beginning to rehabilitate your injury. They may resolve your symptoms with or without further involvement from your physician, physical therapist or . While completing these exercises, remember:   · Muscles can gain both the endurance and the strength needed for everyday activities through controlled exercises.  · Complete these exercises as instructed by your physician, physical therapist or . Progress the resistance and repetitions only as guided.  · You may experience muscle soreness or fatigue, but the pain or discomfort you are trying to eliminate should never worsen during these exercises. If this pain does worsen, stop and make certain you are following the directions exactly. If the pain is still present after adjustments, discontinue the exercise until you can discuss the trouble with your clinician.  STRENGTH - Plantar-flexors   · Sit with your right / left leg extended. Holding onto both ends of a rubber exercise band/tubing, loop it around the ball of your foot. Keep a slight tension in the band.  · Slowly push your toes away from you, pointing them downward.  · Hold this position for __________ seconds. Return slowly, controlling the  tension in the band/tubing.  Repeat __________ times. Complete this exercise __________ times per day.   STRENGTH - Plantar-flexors   · Stand with your feet shoulder width apart. Steady yourself with a wall or table using as little support as needed.  · Keeping your weight evenly spread over the width of your feet, rise up on your toes.*  · Hold this position for __________ seconds.  Repeat __________ times. Complete this exercise __________ times per day.   *If this is too easy, shift your weight toward your right / left leg until you feel challenged. Ultimately, you may be asked to do this exercise with your right / left foot only.  STRENGTH - Plantar-flexors, Eccentric   Note: This exercise can place a lot of stress on your foot and ankle. Please complete this exercise only if specifically instructed by your caregiver.   · Place the balls of your feet on a step. With your hands, use only enough support from a wall or rail to keep your balance.  · Keep your knees straight and rise up on your toes.  · Slowly shift your weight entirely to your right / left toes and  your opposite foot. Gently and with controlled movement, lower your weight through your right / left foot so that your heel drops below the level of the step. You will feel a slight stretch in the back of your calf at the end position.  · Use the healthy leg to help rise up onto the balls of both feet, then lower weight only on the right / left leg again. Build up to 15 repetitions. Then progress to 3 consecutive sets of 15 repetitions.*  · After completing the above exercise, complete the same exercise with a slight knee bend (about 30 degrees). Again, build up to 15 repetitions. Then progress to 3 consecutive sets of 15 repetitions.*  Perform this exercise __________ times per day.   *When you easily complete 3 sets of 15, your physician, physical therapist or  may advise you to add resistance by wearing a backpack filled with  additional weight.  STRENGTH - Plantar Flexors, Seated   · Sit on a chair that allows your feet to rest flat on the ground. If necessary, sit at the edge of the chair.  · Keeping your toes firmly on the ground, lift your right / left heel as far as you can without increasing any discomfort in your ankle.  Repeat __________ times. Complete this exercise __________ times a day.  *If instructed by your physician, physical therapist or , you may add ____________________ of resistance by placing a weighted object on your right / left knee.     This information is not intended to replace advice given to you by your health care provider. Make sure you discuss any questions you have with your health care provider.     Document Released: 07/19/2006 Document Revised: 01/08/2016 Document Reviewed: 08/23/2016  [employer] Interactive Patient Education ©2017 [employer]  Palliative Care  Palliative care is the care of your body, mind, and spirit. Palliative care services are offered to people dealing with serious and life-threatening illnesses, often in the hospital or a long-term care setting. Palliative care requires a team of people who will help to ensure:  · Pain and symptoms are controlled.  · Family support.  · Spiritual support.  · Emotional and social support.  · Comfort.  Palliative care is a way to bring comfort and peace of mind to a person and his or her family. It can also have a positive impact on the course of illness.  Who can receive palliative care services?  Palliative care is offered to children and adults when they are seriously ill and may not be responding well to treatment options. The person may be undergoing active treatment, such as chemotherapy, or may have stopped active treatment. Some people may have just been diagnosed with advanced disease or life-limiting illness. A health care provider will usually recommend palliative care services when more support would be helpful.  What  types of services are included in palliative care?  Palliative care includes a team of health care providers and supporters who come together to help a person facing serious and life-threatening illness. The person's existing doctors are included in the care team, and supporters are added. Family members and friends may also receive palliative care services to cope with stress and other concerns. Services are different for each person and are based on the person's needs and preferences.  The following people make up a palliative care team:  · The person receiving care and his or her family.  · Physicians, including primary health care providers and specialists.  · Nurses.  · A psychosocial worker.  Other people on the palliative care team may include:  · A pain specialist and sometimes a hospice specialist.  · A financial or .  · Community resources, such as school and spiritual organizations.  · [employer] leaders.  · A care coordinator or .  · A bereavement coordinator.  The team will talk with the person and his or her family about:  · The role of the pain specialist and the hospice specialist if this applies.  · The person's active physical symptoms, such as pain, nausea, vomiting, and shortness of breath.  · Stress, depression, and anxiety symptoms.  · Function and mobility issues and how to stay as active as possible.  · Treatment options and how they may affect life.  · Spiritual wishes, such as rituals and prayer.  · Legacy and memory making activities.  · Life and death as a normal process.  · Advance directives or living vance, health care proxies, and end-of-life care.  · Any other concerns or issues.  The palliative care team will make it okay to talk about difficult issues and topics. Preferences and sensitive spiritual and emotional concerns are of high importance.  Palliative care and hospice are somewhat different  Palliative care and hospice care have similar goals of  managing symptoms, promoting comfort, and maintaining dignity. However, hospice care is an option for people during the last 6 months of life expectancy. The palliative care team will coordinate the many support services provided during any phase of serious illness.  This information is not intended to replace advice given to you by your health care provider. Make sure you discuss any questions you have with your health care provider.  Document Released: 12/23/2014 Document Revised: 05/25/2017 Document Reviewed: 11/04/2014  [employer] Interactive Patient Education © 2017 [employer]

## 2018-05-30 ENCOUNTER — OFFICE VISIT (OUTPATIENT)
Dept: ONCOLOGY | Facility: CLINIC | Age: 63
End: 2018-05-30

## 2018-05-30 ENCOUNTER — DOCUMENTATION (OUTPATIENT)
Dept: NUTRITION | Facility: HOSPITAL | Age: 63
End: 2018-05-30

## 2018-05-30 ENCOUNTER — INFUSION (OUTPATIENT)
Dept: ONCOLOGY | Facility: HOSPITAL | Age: 63
End: 2018-05-30

## 2018-05-30 VITALS — HEIGHT: 68 IN | BODY MASS INDEX: 22.35 KG/M2

## 2018-05-30 VITALS
TEMPERATURE: 97.4 F | HEIGHT: 63 IN | BODY MASS INDEX: 26.05 KG/M2 | SYSTOLIC BLOOD PRESSURE: 122 MMHG | DIASTOLIC BLOOD PRESSURE: 66 MMHG | WEIGHT: 147 LBS

## 2018-05-30 DIAGNOSIS — C77.5 PROSTATE CANCER METASTATIC TO INTRAPELVIC LYMPH NODE (HCC): Primary | ICD-10-CM

## 2018-05-30 DIAGNOSIS — C61 PROSTATE CANCER METASTATIC TO INTRAPELVIC LYMPH NODE (HCC): Primary | ICD-10-CM

## 2018-05-30 LAB
ALBUMIN SERPL-MCNC: 3.5 G/DL (ref 3.2–4.8)
ALBUMIN/GLOB SERPL: 1.2 G/DL (ref 1.5–2.5)
ALP SERPL-CCNC: 97 U/L (ref 25–100)
ALT SERPL W P-5'-P-CCNC: 27 U/L (ref 7–40)
ANION GAP SERPL CALCULATED.3IONS-SCNC: 6 MMOL/L (ref 3–11)
AST SERPL-CCNC: 54 U/L (ref 0–33)
BILIRUB SERPL-MCNC: 0.7 MG/DL (ref 0.3–1.2)
BUN BLD-MCNC: 34 MG/DL (ref 9–23)
BUN/CREAT SERPL: 22.7 (ref 7–25)
CALCIUM SPEC-SCNC: 8.8 MG/DL (ref 8.7–10.4)
CHLORIDE SERPL-SCNC: 106 MMOL/L (ref 99–109)
CO2 SERPL-SCNC: 25 MMOL/L (ref 20–31)
CREAT BLD-MCNC: 1.5 MG/DL (ref 0.6–1.3)
ERYTHROCYTE [DISTWIDTH] IN BLOOD BY AUTOMATED COUNT: 18.2 % (ref 11.3–14.5)
GFR SERPL CREATININE-BSD FRML MDRD: 57 ML/MIN/1.73
GLOBULIN UR ELPH-MCNC: 3 GM/DL
GLUCOSE BLD-MCNC: 181 MG/DL (ref 70–100)
HCT VFR BLD AUTO: 25.9 % (ref 38.9–50.9)
HGB BLD-MCNC: 8 G/DL (ref 13.1–17.5)
LYMPHOCYTES # BLD AUTO: 1.2 10*3/MM3 (ref 0.6–4.8)
LYMPHOCYTES NFR BLD AUTO: 12.6 % (ref 24–44)
MCH RBC QN AUTO: 28.4 PG (ref 27–31)
MCHC RBC AUTO-ENTMCNC: 31 G/DL (ref 32–36)
MCV RBC AUTO: 91.8 FL (ref 80–99)
MONOCYTES # BLD AUTO: 0.5 10*3/MM3 (ref 0–1)
MONOCYTES NFR BLD AUTO: 4.6 % (ref 0–12)
NEUTROPHILS # BLD AUTO: 8.2 10*3/MM3 (ref 1.5–8.3)
NEUTROPHILS NFR BLD AUTO: 82.8 % (ref 41–71)
PLATELET # BLD AUTO: 319 10*3/MM3 (ref 150–450)
PMV BLD AUTO: 6.9 FL (ref 6–12)
POTASSIUM BLD-SCNC: 4.1 MMOL/L (ref 3.5–5.5)
PROT SERPL-MCNC: 6.5 G/DL (ref 5.7–8.2)
PSA SERPL-MCNC: 0.7 NG/ML (ref 0–4)
RBC # BLD AUTO: 2.82 10*6/MM3 (ref 4.2–5.76)
SODIUM BLD-SCNC: 137 MMOL/L (ref 132–146)
WBC NRBC COR # BLD: 9.9 10*3/MM3 (ref 3.5–10.8)

## 2018-05-30 PROCEDURE — 25010000002 DOCETAXEL 20 MG/ML CONCENTRATION 4 ML VIAL: Performed by: INTERNAL MEDICINE

## 2018-05-30 PROCEDURE — 84153 ASSAY OF PSA TOTAL: CPT

## 2018-05-30 PROCEDURE — 96360 HYDRATION IV INFUSION INIT: CPT

## 2018-05-30 PROCEDURE — 96413 CHEMO IV INFUSION 1 HR: CPT

## 2018-05-30 PROCEDURE — 80053 COMPREHEN METABOLIC PANEL: CPT

## 2018-05-30 PROCEDURE — 85025 COMPLETE CBC W/AUTO DIFF WBC: CPT

## 2018-05-30 PROCEDURE — 36415 COLL VENOUS BLD VENIPUNCTURE: CPT

## 2018-05-30 PROCEDURE — 25010000002 DOCETAXEL 20 MG/ML CONCENTRATION 1 ML VIAL: Performed by: INTERNAL MEDICINE

## 2018-05-30 PROCEDURE — 99214 OFFICE O/P EST MOD 30 MIN: CPT | Performed by: INTERNAL MEDICINE

## 2018-05-30 RX ORDER — SODIUM CHLORIDE 9 MG/ML
250 INJECTION, SOLUTION INTRAVENOUS ONCE
Status: CANCELLED | OUTPATIENT
Start: 2018-05-30

## 2018-05-30 RX ORDER — SODIUM CHLORIDE 9 MG/ML
250 INJECTION, SOLUTION INTRAVENOUS ONCE
Status: COMPLETED | OUTPATIENT
Start: 2018-05-30 | End: 2018-05-30

## 2018-05-30 RX ADMIN — SODIUM CHLORIDE 250 ML: 9 INJECTION, SOLUTION INTRAVENOUS at 10:30

## 2018-05-30 RX ADMIN — DOCETAXEL 135 MG: 20 INJECTION, SOLUTION, CONCENTRATE INTRAVENOUS at 10:54

## 2018-05-30 NOTE — PROGRESS NOTES
"     Prostate cancer metastatic to intrapelvic lymph node    12/13/2017 Initial Diagnosis     Prostate cancer metastatic to intrapelvic lymph node       1/30/2018 -  Hormonal Therapy     Received Julia with Dr. Sellers          - 1/30/2018 Radiation     Radiation OncologyTreatment Course:  Sadi Stone JrTara received 45 cGy in 18 fractions with Dr. Rosas at [facility]            REASON FOR VISIT: Metastatic Prostate Cancer    HISTORY OF PRESENT ILLNESS:   63 y.o.  male presents today for Follow-up of his locally advanced prostate cancer.  He completed 4 cycles of Taxotere.  He had couple days of severe fatigue.  Otherwise did well.  No significant neuropathy.  No pain.    Past medical history, social history and family history was reviewed and unchanged from prior visit.    Review of Systems:    Review of Systems   Constitutional: Positive for fatigue.   HENT:  Negative.    Eyes: Negative.    Respiratory: Negative.    Cardiovascular: Negative.    Gastrointestinal: Negative.    Endocrine: Negative.    Genitourinary: Negative.     Musculoskeletal: Negative.    Skin: Negative.    Neurological: Negative.    Hematological: Negative.    Psychiatric/Behavioral: Negative.       A comprehensive 14 point review of systems was performed and was negative except as mentioned.      Medications:  The current medication list was reviewed in the EMR    ALLERGIES:    Allergies   Allergen Reactions   • Bactrim [Sulfamethoxazole-Trimethoprim] Anaphylaxis         Physical Exam    VITAL SIGNS:  /66   Temp 97.4 °F (36.3 °C) (Oral)   Ht 160 cm (63\")   Wt 66.7 kg (147 lb)   BMI 26.04 kg/m²     Wt Readings from Last 3 Encounters:   05/30/18 66.7 kg (147 lb)   05/25/18 67.5 kg (148 lb 12.8 oz)   05/07/18 66 kg (145 lb 8 oz)        Performance Status: 0    General: well appearing, in no acute distress  HEENT: sclera anicteric, oropharynx clear, neck is supple  Lymphatics: no cervical, supraclavicular, or axillary " adenopathy  Cardiovascular: regular rate and rhythm, no murmurs, rubs or gallops  Lungs: clear to auscultation bilaterally  Abdomen: soft, nontender, nondistended.  No palpable organomegaly  Extremities: no lower extremity edema  Skin: no rashes, lesions, bruising, or petechiae  Msk:  Shows no weakness of the large muscle groups  Psych: Mood is stable            Assessment/Plan    1.  Locally advanced metastatic prostate cancer: Continue Eligard with Dr. Sellers.  He tolerated first cycle of Taxotere reasonably well.  Plan for 5 more cycles with no dose reduction for his next cycle.  I will see him back in 3 weeks      2.  Pelvic pain: Unclear if this is related to his disease or the stents. on Norco 10 to help with the pain.      Return to clinic in 3 weeks for cycle #3 of Taxotere.      I spent 25 minutes on the patient's plan and care with over 50% spent counseling the patient regarding the recommendations of the tumor board and treatment going forward.        Jolynn Dao MD  [facility] Hematology and Oncology    5/30/2018     Return on: 06/20/18  Return in (Approximately): 3 weeks, Schedule with next infusion      Please note that portions of this note may have been completed with a voice recognition program. Efforts were made to edit the dictations, but occasionally words are mistranscribed.

## 2018-05-31 NOTE — PROGRESS NOTES
Onc Nutrition     Patient Name:  Sadi Stone Jr.  YOB: 1955    Weight: 147#; stable  Nutrition Symptoms: taste changes    Follow up with patient during his chemo infusion appointment.  Patient reports his appetite has been ok; he states he is drinking 2 Boost per day; and complains of taste changes.    Instructed him to be using the baking soda/salt solution before meals to aid with taste changes and provided written diet materials to reinforce information discussed.  Encouraged him to continue drinking Boost as needed to aid with oral intake and provided him with coupons.  Also provided and reviewed written diet material Anemia.      Answered his questions and he voiced understanding of information discussed.  RD's contact information provided and encouraged him to call with questions.  Will follow up as indicated.     Electronically signed by:  Indy Jaramillo RD  05/31/18 10:30 AM

## 2018-06-11 ENCOUNTER — TELEPHONE (OUTPATIENT)
Dept: ONCOLOGY | Facility: CLINIC | Age: 63
End: 2018-06-11

## 2018-06-11 DIAGNOSIS — C64.2 MALIGNANT NEOPLASM OF LEFT KIDNEY (HCC): Primary | ICD-10-CM

## 2018-06-11 DIAGNOSIS — C77.5 PROSTATE CANCER METASTATIC TO INTRAPELVIC LYMPH NODE (HCC): ICD-10-CM

## 2018-06-11 DIAGNOSIS — C61 PROSTATE CANCER METASTATIC TO INTRAPELVIC LYMPH NODE (HCC): ICD-10-CM

## 2018-06-11 RX ORDER — AMOXICILLIN AND CLAVULANATE POTASSIUM 875; 125 MG/1; MG/1
1 TABLET, FILM COATED ORAL EVERY 12 HOURS SCHEDULED
Qty: 20 TABLET | Refills: 0 | Status: SHIPPED | OUTPATIENT
Start: 2018-06-11 | End: 2018-08-27

## 2018-06-11 NOTE — TELEPHONE ENCOUNTER
----- Message from Leslie Cross sent at 6/11/2018  8:05 AM EDT -----  Regarding: YASMINE-COLD  Contact: [phone]  Patient has a cold and wants to know what he can take due to being on chemo medication? Please call number above and if you can reach him on that call [phone].

## 2018-06-11 NOTE — TELEPHONE ENCOUNTER
Returned call to patient.  He reports that he has scratchy throat, runny nose sneezing.  Discussed with Dr Powell he gave a verbal order for Augmentin q 12 hrs for 10 days.  Advised to use clariton or zyrtec for allergies.  Patient verbalized understanding.

## 2018-06-20 ENCOUNTER — INFUSION (OUTPATIENT)
Dept: ONCOLOGY | Facility: HOSPITAL | Age: 63
End: 2018-06-20

## 2018-06-20 ENCOUNTER — OFFICE VISIT (OUTPATIENT)
Dept: ONCOLOGY | Facility: CLINIC | Age: 63
End: 2018-06-20

## 2018-06-20 VITALS
SYSTOLIC BLOOD PRESSURE: 161 MMHG | WEIGHT: 152 LBS | HEIGHT: 68 IN | RESPIRATION RATE: 16 BRPM | TEMPERATURE: 98.6 F | HEART RATE: 79 BPM | BODY MASS INDEX: 23.04 KG/M2 | DIASTOLIC BLOOD PRESSURE: 82 MMHG

## 2018-06-20 DIAGNOSIS — C64.2 MALIGNANT NEOPLASM OF LEFT KIDNEY (HCC): ICD-10-CM

## 2018-06-20 DIAGNOSIS — C77.5 PROSTATE CANCER METASTATIC TO INTRAPELVIC LYMPH NODE (HCC): Primary | ICD-10-CM

## 2018-06-20 DIAGNOSIS — C61 PROSTATE CANCER METASTATIC TO INTRAPELVIC LYMPH NODE (HCC): Primary | ICD-10-CM

## 2018-06-20 LAB
ALBUMIN SERPL-MCNC: 3.69 G/DL (ref 3.2–4.8)
ALBUMIN/GLOB SERPL: 1.2 G/DL (ref 1.5–2.5)
ALP SERPL-CCNC: 110 U/L (ref 25–100)
ALT SERPL W P-5'-P-CCNC: 40 U/L (ref 7–40)
ANION GAP SERPL CALCULATED.3IONS-SCNC: 7 MMOL/L (ref 3–11)
AST SERPL-CCNC: 48 U/L (ref 0–33)
BILIRUB SERPL-MCNC: 0.5 MG/DL (ref 0.3–1.2)
BUN BLD-MCNC: 34 MG/DL (ref 9–23)
BUN/CREAT SERPL: 27.2 (ref 7–25)
CALCIUM SPEC-SCNC: 9.4 MG/DL (ref 8.7–10.4)
CHLORIDE SERPL-SCNC: 107 MMOL/L (ref 99–109)
CO2 SERPL-SCNC: 25 MMOL/L (ref 20–31)
CREAT BLD-MCNC: 1.25 MG/DL (ref 0.6–1.3)
ERYTHROCYTE [DISTWIDTH] IN BLOOD BY AUTOMATED COUNT: 19.4 % (ref 11.3–14.5)
GFR SERPL CREATININE-BSD FRML MDRD: 70 ML/MIN/1.73
GLOBULIN UR ELPH-MCNC: 3.1 GM/DL
GLUCOSE BLD-MCNC: 129 MG/DL (ref 70–100)
HCT VFR BLD AUTO: 27.7 % (ref 38.9–50.9)
HGB BLD-MCNC: 8.6 G/DL (ref 13.1–17.5)
LYMPHOCYTES # BLD AUTO: 1.6 10*3/MM3 (ref 0.6–4.8)
LYMPHOCYTES NFR BLD AUTO: 12.4 % (ref 24–44)
MCH RBC QN AUTO: 28.3 PG (ref 27–31)
MCHC RBC AUTO-ENTMCNC: 31 G/DL (ref 32–36)
MCV RBC AUTO: 91.6 FL (ref 80–99)
MONOCYTES # BLD AUTO: 0.6 10*3/MM3 (ref 0–1)
MONOCYTES NFR BLD AUTO: 4.6 % (ref 0–12)
NEUTROPHILS # BLD AUTO: 10.6 10*3/MM3 (ref 1.5–8.3)
NEUTROPHILS NFR BLD AUTO: 83 % (ref 41–71)
PLATELET # BLD AUTO: 342 10*3/MM3 (ref 150–450)
PMV BLD AUTO: 6.4 FL (ref 6–12)
POTASSIUM BLD-SCNC: 4.7 MMOL/L (ref 3.5–5.5)
PROT SERPL-MCNC: 6.8 G/DL (ref 5.7–8.2)
PSA SERPL-MCNC: 0.41 NG/ML (ref 0–4)
RBC # BLD AUTO: 3.02 10*6/MM3 (ref 4.2–5.76)
SODIUM BLD-SCNC: 139 MMOL/L (ref 132–146)
WBC NRBC COR # BLD: 12.8 10*3/MM3 (ref 3.5–10.8)

## 2018-06-20 PROCEDURE — 84153 ASSAY OF PSA TOTAL: CPT

## 2018-06-20 PROCEDURE — 25010000002 DOCETAXEL 20 MG/ML CONCENTRATION 4 ML VIAL: Performed by: INTERNAL MEDICINE

## 2018-06-20 PROCEDURE — 99214 OFFICE O/P EST MOD 30 MIN: CPT | Performed by: INTERNAL MEDICINE

## 2018-06-20 PROCEDURE — 80053 COMPREHEN METABOLIC PANEL: CPT

## 2018-06-20 PROCEDURE — 85025 COMPLETE CBC W/AUTO DIFF WBC: CPT

## 2018-06-20 PROCEDURE — 25010000002 DOCETAXEL 20 MG/ML CONCENTRATION 1 ML VIAL: Performed by: INTERNAL MEDICINE

## 2018-06-20 PROCEDURE — 96413 CHEMO IV INFUSION 1 HR: CPT

## 2018-06-20 RX ORDER — SODIUM CHLORIDE 9 MG/ML
250 INJECTION, SOLUTION INTRAVENOUS ONCE
Status: CANCELLED | OUTPATIENT
Start: 2018-06-20

## 2018-06-20 RX ORDER — SODIUM CHLORIDE 9 MG/ML
250 INJECTION, SOLUTION INTRAVENOUS ONCE
Status: COMPLETED | OUTPATIENT
Start: 2018-06-20 | End: 2018-06-20

## 2018-06-20 RX ADMIN — DOCETAXEL 135 MG: 20 INJECTION, SOLUTION, CONCENTRATE INTRAVENOUS at 10:41

## 2018-06-20 RX ADMIN — SODIUM CHLORIDE 250 ML: 9 INJECTION, SOLUTION INTRAVENOUS at 10:41

## 2018-06-20 NOTE — PROGRESS NOTES
"     Prostate cancer metastatic to intrapelvic lymph node    12/13/2017 Initial Diagnosis     Prostate cancer metastatic to intrapelvic lymph node       1/30/2018 -  Hormonal Therapy     Received Julia with Dr. Sellers          - 1/30/2018 Radiation     Radiation OncologyTreatment Course:  Sadi Stone  received 45 cGy in 18 fractions with Dr. Rosas at [facility]            REASON FOR VISIT: Metastatic Prostate Cancer    HISTORY OF PRESENT ILLNESS:   63 y.o.  male presents today for Follow-up of his locally advanced prostate cancer.  He completed 3 cycles of Taxotere.  He is getting more fatigued.  Lower extremity edema still present.  Back pain much better.  Having some issues with sleep.    Past medical history, social history and family history was reviewed and unchanged from prior visit.    Review of Systems:    Review of Systems   Constitutional: Positive for fatigue.   HENT:  Negative.    Eyes: Negative.    Respiratory: Negative.    Cardiovascular: Positive for leg swelling.   Gastrointestinal: Negative.    Endocrine: Negative.    Genitourinary: Negative.     Musculoskeletal: Negative.    Skin: Negative.    Neurological: Negative.    Hematological: Negative.    Psychiatric/Behavioral: Negative.       A comprehensive 14 point review of systems was performed and was negative except as mentioned.      Medications:  The current medication list was reviewed in the EMR    ALLERGIES:    Allergies   Allergen Reactions   • Bactrim [Sulfamethoxazole-Trimethoprim] Anaphylaxis         Physical Exam    VITAL SIGNS:  /82 Comment: LUE  Pulse 79   Temp 98.6 °F (37 °C) (Temporal Artery )   Resp 16   Ht 172.7 cm (68\")   Wt 68.9 kg (152 lb)   BMI 23.11 kg/m²     Wt Readings from Last 3 Encounters:   06/20/18 68.9 kg (152 lb)   05/30/18 66.7 kg (147 lb)   05/25/18 67.5 kg (148 lb 12.8 oz)        Performance Status: 0    General: well appearing, in no acute distress  HEENT: sclera anicteric, " oropharynx clear, neck is supple  Lymphatics: no cervical, supraclavicular, or axillary adenopathy  Cardiovascular: regular rate and rhythm, no murmurs, rubs or gallops  Lungs: clear to auscultation bilaterally  Abdomen: soft, nontender, nondistended.  No palpable organomegaly  Extremities: + Left  lower extremity edema  Skin: no rashes, lesions, bruising, or petechiae  Msk:  Shows no weakness of the large muscle groups  Psych: Mood is stable    Lab Results   Component Value Date    HGB 8.0 (L) 05/30/2018    HCT 25.9 (L) 05/30/2018    MCV 91.8 05/30/2018     05/30/2018    WBC 9.90 05/30/2018    NEUTROABS 8.20 05/30/2018    LYMPHSABS 1.20 05/30/2018    MONOSABS 0.50 05/30/2018     Lab Results   Component Value Date    GLUCOSE 181 (H) 05/30/2018    BUN 34 (H) 05/30/2018    CREATININE 1.50 (H) 05/30/2018     05/30/2018    K 4.1 05/30/2018     05/30/2018    CO2 25.0 05/30/2018    CALCIUM 8.8 05/30/2018    PROTEINTOT 6.5 05/30/2018    ALBUMIN 3.50 05/30/2018    BILITOT 0.7 05/30/2018    ALKPHOS 97 05/30/2018    AST 54 (H) 05/30/2018    ALT 27 05/30/2018       Lab Results   Component Value Date    PSA 0.700 05/30/2018    PSA 0.480 05/07/2018    PSA 4.850 (H) 10/06/2017    PSA 4.410 (H) 08/29/2017   ]        Assessment/Plan    1.  Locally advanced metastatic prostate cancer: Continue Eligard with Dr. Sellers.  Continue cycle #3 of Taxotere with no dose reduction.  Plan for 6 cycles with no dose reduction for his next cycle.  I will see him back in 3 weeks.  I will plan to CAT scan and before I see him next visit.      2.  Pelvic pain: Unclear if this is related to his disease or the stents. on Norco 10 to help with the pain.    3.  Insomnia: Patient is going to try melatonin.    4.  Left lower extremity edema secondary to lymphadenopathy      Return to clinic in 3 weeks for cycle #4 of Taxotere.      I spent 25 minutes on the patient's plan and care with over 50% spent counseling the patient regarding the  recommendations of the tumor board and treatment going forward.        Jolynn Dao MD  [facility] Hematology and Oncology    6/20/2018     Return on: 07/16/18  Return in (Approximately): Schedule with next infusion, 3 weeks    Orders Placed This Encounter   Procedures   • CT Abdomen Pelvis With Contrast     Standing Status:   Future     Standing Expiration Date:   6/20/2019     Order Specific Question:   Will Oral Contrast be needed for this procedure?     Answer:   Yes   • PSA Diagnostic     Standing Status:   Future     Standing Expiration Date:   6/20/2019   • Comprehensive metabolic panel     Standing Status:   Future     Standing Expiration Date:   6/20/2019   • CBC and Differential     Standing Status:   Future     Standing Expiration Date:   6/20/2019     Order Specific Question:   Manual Differential     Answer:   No         Please note that portions of this note may have been completed with a voice recognition program. Efforts were made to edit the dictations, but occasionally words are mistranscribed.

## 2018-06-26 ENCOUNTER — TELEPHONE (OUTPATIENT)
Dept: ONCOLOGY | Facility: CLINIC | Age: 63
End: 2018-06-26

## 2018-06-26 DIAGNOSIS — C61 PROSTATE CANCER METASTATIC TO INTRAPELVIC LYMPH NODE (HCC): ICD-10-CM

## 2018-06-26 DIAGNOSIS — C64.2 MALIGNANT NEOPLASM OF LEFT KIDNEY (HCC): Primary | ICD-10-CM

## 2018-06-26 DIAGNOSIS — C77.5 PROSTATE CANCER METASTATIC TO INTRAPELVIC LYMPH NODE (HCC): ICD-10-CM

## 2018-06-26 RX ORDER — METOCLOPRAMIDE 10 MG/1
5 TABLET ORAL
Qty: 120 TABLET | Refills: 5 | Status: SHIPPED | OUTPATIENT
Start: 2018-06-26 | End: 2018-01-01 | Stop reason: HOSPADM

## 2018-06-26 RX ORDER — FLUCONAZOLE 200 MG/1
200 TABLET ORAL DAILY
Qty: 5 TABLET | Refills: 1 | Status: SHIPPED | OUTPATIENT
Start: 2018-06-26 | End: 2018-09-05

## 2018-07-09 ENCOUNTER — HOSPITAL ENCOUNTER (OUTPATIENT)
Dept: CT IMAGING | Facility: HOSPITAL | Age: 63
Discharge: HOME | End: 2018-07-09
Attending: INTERNAL MEDICINE | Admitting: INTERNAL MEDICINE

## 2018-07-09 DIAGNOSIS — C61 PROSTATE CANCER METASTATIC TO INTRAPELVIC LYMPH NODE (HCC): ICD-10-CM

## 2018-07-09 DIAGNOSIS — C77.5 PROSTATE CANCER METASTATIC TO INTRAPELVIC LYMPH NODE (HCC): ICD-10-CM

## 2018-07-09 DIAGNOSIS — C64.2 MALIGNANT NEOPLASM OF LEFT KIDNEY (HCC): ICD-10-CM

## 2018-07-09 PROCEDURE — 25010000002 IOPAMIDOL 61 % SOLUTION: Performed by: INTERNAL MEDICINE

## 2018-07-09 PROCEDURE — 74177 CT ABD & PELVIS W/CONTRAST: CPT

## 2018-07-09 RX ADMIN — IOPAMIDOL 95 ML: 612 INJECTION, SOLUTION INTRAVENOUS at 12:15

## 2018-07-16 ENCOUNTER — INFUSION (OUTPATIENT)
Dept: ONCOLOGY | Facility: HOSPITAL | Age: 63
End: 2018-07-16

## 2018-07-16 ENCOUNTER — OFFICE VISIT (OUTPATIENT)
Dept: ONCOLOGY | Facility: CLINIC | Age: 63
End: 2018-07-16

## 2018-07-16 VITALS
WEIGHT: 155 LBS | TEMPERATURE: 97.7 F | HEIGHT: 68 IN | RESPIRATION RATE: 16 BRPM | HEART RATE: 82 BPM | DIASTOLIC BLOOD PRESSURE: 65 MMHG | BODY MASS INDEX: 23.49 KG/M2 | SYSTOLIC BLOOD PRESSURE: 135 MMHG

## 2018-07-16 DIAGNOSIS — C61 PROSTATE CANCER METASTATIC TO INTRAPELVIC LYMPH NODE (HCC): Primary | ICD-10-CM

## 2018-07-16 DIAGNOSIS — C61 PROSTATE CANCER METASTATIC TO INTRAPELVIC LYMPH NODE (HCC): ICD-10-CM

## 2018-07-16 DIAGNOSIS — C77.5 PROSTATE CANCER METASTATIC TO INTRAPELVIC LYMPH NODE (HCC): Primary | ICD-10-CM

## 2018-07-16 DIAGNOSIS — C77.5 PROSTATE CANCER METASTATIC TO INTRAPELVIC LYMPH NODE (HCC): ICD-10-CM

## 2018-07-16 LAB
ALBUMIN SERPL-MCNC: 3.69 G/DL (ref 3.2–4.8)
ALBUMIN/GLOB SERPL: 1.4 G/DL (ref 1.5–2.5)
ALP SERPL-CCNC: 82 U/L (ref 25–100)
ALT SERPL W P-5'-P-CCNC: 23 U/L (ref 7–40)
ANION GAP SERPL CALCULATED.3IONS-SCNC: 4 MMOL/L (ref 3–11)
AST SERPL-CCNC: 36 U/L (ref 0–33)
BILIRUB SERPL-MCNC: 0.6 MG/DL (ref 0.3–1.2)
BUN BLD-MCNC: 31 MG/DL (ref 9–23)
BUN/CREAT SERPL: 24.2 (ref 7–25)
CALCIUM SPEC-SCNC: 9 MG/DL (ref 8.7–10.4)
CHLORIDE SERPL-SCNC: 108 MMOL/L (ref 99–109)
CO2 SERPL-SCNC: 26 MMOL/L (ref 20–31)
CREAT BLD-MCNC: 1.28 MG/DL (ref 0.6–1.3)
ERYTHROCYTE [DISTWIDTH] IN BLOOD BY AUTOMATED COUNT: 19.9 % (ref 11.3–14.5)
GFR SERPL CREATININE-BSD FRML MDRD: 68 ML/MIN/1.73
GLOBULIN UR ELPH-MCNC: 2.6 GM/DL
GLUCOSE BLD-MCNC: 121 MG/DL (ref 70–100)
HCT VFR BLD AUTO: 29.8 % (ref 38.9–50.9)
HGB BLD-MCNC: 9.3 G/DL (ref 13.1–17.5)
LYMPHOCYTES # BLD AUTO: 1.2 10*3/MM3 (ref 0.6–4.8)
LYMPHOCYTES NFR BLD AUTO: 9.7 % (ref 24–44)
MCH RBC QN AUTO: 28.4 PG (ref 27–31)
MCHC RBC AUTO-ENTMCNC: 31.3 G/DL (ref 32–36)
MCV RBC AUTO: 90.8 FL (ref 80–99)
MONOCYTES # BLD AUTO: 0.6 10*3/MM3 (ref 0–1)
MONOCYTES NFR BLD AUTO: 4.9 % (ref 0–12)
NEUTROPHILS # BLD AUTO: 10.2 10*3/MM3 (ref 1.5–8.3)
NEUTROPHILS NFR BLD AUTO: 85.4 % (ref 41–71)
PLATELET # BLD AUTO: 314 10*3/MM3 (ref 150–450)
PMV BLD AUTO: 6.7 FL (ref 6–12)
POTASSIUM BLD-SCNC: 4.2 MMOL/L (ref 3.5–5.5)
PROT SERPL-MCNC: 6.3 G/DL (ref 5.7–8.2)
PSA SERPL-MCNC: 0.3 NG/ML (ref 0–4)
RBC # BLD AUTO: 3.28 10*6/MM3 (ref 4.2–5.76)
SODIUM BLD-SCNC: 138 MMOL/L (ref 132–146)
WBC NRBC COR # BLD: 11.9 10*3/MM3 (ref 3.5–10.8)

## 2018-07-16 PROCEDURE — 85025 COMPLETE CBC W/AUTO DIFF WBC: CPT

## 2018-07-16 PROCEDURE — 84153 ASSAY OF PSA TOTAL: CPT

## 2018-07-16 PROCEDURE — 96413 CHEMO IV INFUSION 1 HR: CPT

## 2018-07-16 PROCEDURE — 25010000002 DOCETAXEL 20 MG/ML CONCENTRATION 1 ML VIAL: Performed by: INTERNAL MEDICINE

## 2018-07-16 PROCEDURE — 99214 OFFICE O/P EST MOD 30 MIN: CPT | Performed by: INTERNAL MEDICINE

## 2018-07-16 PROCEDURE — 25010000002 DOCETAXEL 20 MG/ML CONCENTRATION 4 ML VIAL: Performed by: INTERNAL MEDICINE

## 2018-07-16 PROCEDURE — 80053 COMPREHEN METABOLIC PANEL: CPT

## 2018-07-16 RX ORDER — SODIUM CHLORIDE 9 MG/ML
250 INJECTION, SOLUTION INTRAVENOUS ONCE
Status: CANCELLED | OUTPATIENT
Start: 2018-07-16

## 2018-07-16 RX ORDER — SODIUM CHLORIDE 9 MG/ML
250 INJECTION, SOLUTION INTRAVENOUS ONCE
Status: COMPLETED | OUTPATIENT
Start: 2018-07-16 | End: 2018-07-16

## 2018-07-16 RX ORDER — TEMAZEPAM 7.5 MG/1
CAPSULE ORAL
Qty: 60 CAPSULE | Refills: 5 | OUTPATIENT
Start: 2018-07-16 | End: 2018-09-05

## 2018-07-16 RX ADMIN — DOCETAXEL 140 MG: 80 INJECTION, SOLUTION, CONCENTRATE INTRAVENOUS at 12:41

## 2018-07-16 RX ADMIN — SODIUM CHLORIDE 250 ML: 9 INJECTION, SOLUTION INTRAVENOUS at 12:41

## 2018-07-17 NOTE — PROGRESS NOTES
"     Prostate cancer metastatic to intrapelvic lymph node (CMS/HCC)    12/13/2017 Initial Diagnosis     Prostate cancer metastatic to intrapelvic lymph node       1/30/2018 -  Hormonal Therapy     Received Julia with Dr. Sellers          - 1/30/2018 Radiation     Radiation OncologyTreatment Course:  Sadi Stone Jr. received 45 cGy in 18 fractions with Dr. Roass at [facility]            REASON FOR VISIT: Metastatic Prostate Cancer    HISTORY OF PRESENT ILLNESS:   63 y.o.  male presents today for Follow-up of his locally advanced prostate cancer.  He completed 4 cycles of Taxotere.  He is doing well with no back pain..  Lower extremity edema still present due to lymphedema.  Back pain much better.  Having some issues with sleep.    Past medical history, social history and family history was reviewed and unchanged from prior visit.    Review of Systems:    Review of Systems   Constitutional: Positive for fatigue.   HENT:  Negative.    Eyes: Negative.    Respiratory: Negative.    Cardiovascular: Positive for leg swelling.   Gastrointestinal: Negative.    Endocrine: Negative.    Genitourinary: Negative.     Musculoskeletal: Negative.    Skin: Negative.    Neurological: Negative.    Hematological: Negative.    Psychiatric/Behavioral: Negative.       A comprehensive 14 point review of systems was performed and was negative except as mentioned.      Medications:  The current medication list was reviewed in the EMR    ALLERGIES:    Allergies   Allergen Reactions   • Bactrim [Sulfamethoxazole-Trimethoprim] Anaphylaxis         Physical Exam    VITAL SIGNS:  /65 Comment: LUE  Pulse 82   Temp 97.7 °F (36.5 °C) (Temporal Artery )   Resp 16   Ht 172.7 cm (68\")   Wt 70.3 kg (155 lb)   BMI 23.57 kg/m²     Wt Readings from Last 3 Encounters:   07/16/18 70.3 kg (155 lb)   06/20/18 68.9 kg (152 lb)   05/30/18 66.7 kg (147 lb)        Performance Status: 0    General: well appearing, in no acute " distress  HEENT: sclera anicteric, oropharynx clear, neck is supple  Lymphatics: no cervical, supraclavicular, or axillary adenopathy  Cardiovascular: regular rate and rhythm, no murmurs, rubs or gallops  Lungs: clear to auscultation bilaterally  Abdomen: soft, nontender, nondistended.  No palpable organomegaly  Extremities: + Left  lower extremity edema  Skin: no rashes, lesions, bruising, or petechiae  Msk:  Shows no weakness of the large muscle groups  Psych: Mood is stable    Lab Results   Component Value Date    HGB 9.3 (L) 07/16/2018    HCT 29.8 (L) 07/16/2018    MCV 90.8 07/16/2018     07/16/2018    WBC 11.90 (H) 07/16/2018    NEUTROABS 10.20 (H) 07/16/2018    LYMPHSABS 1.20 07/16/2018    MONOSABS 0.60 07/16/2018     Lab Results   Component Value Date    GLUCOSE 121 (H) 07/16/2018    BUN 31 (H) 07/16/2018    CREATININE 1.28 07/16/2018     07/16/2018    K 4.2 07/16/2018     07/16/2018    CO2 26.0 07/16/2018    CALCIUM 9.0 07/16/2018    PROTEINTOT 6.3 07/16/2018    ALBUMIN 3.69 07/16/2018    BILITOT 0.6 07/16/2018    ALKPHOS 82 07/16/2018    AST 36 (H) 07/16/2018    ALT 23 07/16/2018       Lab Results   Component Value Date    PSA 0.300 07/16/2018    PSA 0.410 06/20/2018    PSA 0.700 05/30/2018    PSA 0.480 05/07/2018    PSA 4.850 (H) 10/06/2017   ]    Xr Ankle 3+ View Right    Result Date: 5/25/2018  No acute bony abnormality identified.  D:  05/25/2018 E:  05/25/2018  This report was finalized on 5/25/2018 5:50 PM by Dr. Indy Whitlock MD.      Ct Chest With Contrast    Result Date: 5/4/2018  Retrocrural adenopathy as well as abnormally enlarged lymph nodes posterior to the mid esophagus. Small retroperitoneal lymph nodes are identified with mild obstruction seen of the left renal collecting system. Stent placed in the left ureter with satisfactory position of the stent. Continued followup is recommended.  D:  05/03/2018 E:  05/03/2018  This report was finalized on 5/4/2018 1:37 PM by  Dr. Indy Whitlock MD.      Nm Bone Scan Whole Body    Result Date: 5/3/2018  There are extensive arthritic changes, but there is a single focal abnormality in a nonarthritic distribution. That is increased activity in a planar distribution involving T12 or L1. Review of CT scan of the abdomen and pelvis performed on same day demonstrates a very subtle abnormality with sclerosis in the superior aspect of the L1 vertebral body and surrounding decreased mineralization. This is not the classic picture of prostate metastatic disease.  D:  05/03/2018 E:  05/03/2018     This report was finalized on 5/3/2018 4:22 PM by Dr. Tung Moran MD.      Ct Abdomen Pelvis With Contrast    Result Date: 7/9/2018  Postradiation changes identified within the pelvis with stranding seen within the fat as well as wall thickening of the bladder and rectum. There is some improvement seen of several of the retroperitoneal lymph nodes. Several of the retroperitoneal lymph nodes also remains stable in size and appearance. There is no evidence of progression of disease.  D:  07/09/2018 E:  07/09/2018   This report was finalized on 7/9/2018 1:01 PM by Dr. Indy Whitlock MD.      Ct Abdomen Pelvis With Contrast    Result Date: 5/4/2018  Retrocrural adenopathy as well as abnormally enlarged lymph nodes posterior to the mid esophagus. Small retroperitoneal lymph nodes are identified with mild obstruction seen of the left renal collecting system. Stent placed in the left ureter with satisfactory position of the stent. Continued followup is recommended.  D:  05/03/2018 E:  05/03/2018  This report was finalized on 5/4/2018 1:37 PM by Dr. Indy Whitlock MD.      Xr Chest Pa & Lateral    Result Date: 3/22/2018  No acute cardiopulmonary process.  D:  03/22/2018 E:  03/22/2018  This report was finalized on 3/22/2018 12:39 PM by Dr. Josh Peace.        Assessment/Plan    1.  Locally advanced metastatic prostate cancer: Continue Eligard with   Marlo.  Continue cycle #5 of Taxotere with no dose reduction.  Plan for 6 cycles with no dose reduction for his next cycle.  I will see him back in 3 weeks.  I reviewed his CAT scans today which shows improvement in his lymphadenopathy.      2.  Pelvic pain: Unclear if this is related to his disease or the stents. It appears better..    3.  Insomnia: will start restiril    4.  Left lower extremity edema secondary to lymphadenopathy      Return to clinic in 3 weeks for cycle #4 of Taxotere.      I spent 25 minutes on the patient's plan and care with over 50% spent counseling the patient regarding the recommendations of the tumor board and treatment going forward.        Jolynn Dao MD  [facility] Hematology and Oncology    7/16/2018     Return on: 08/06/18  Return in (Approximately): 3 weeks, Schedule with next infusion    Orders Placed This Encounter   Procedures   • PSA Diagnostic     Standing Status:   Future     Number of Occurrences:   1     Standing Expiration Date:   7/16/2019   • Comprehensive metabolic panel     Standing Status:   Future     Number of Occurrences:   1     Standing Expiration Date:   7/16/2019   • CBC and Differential     Standing Status:   Future     Number of Occurrences:   1     Standing Expiration Date:   7/16/2019     Order Specific Question:   Manual Differential     Answer:   No         Please note that portions of this note may have been completed with a voice recognition program. Efforts were made to edit the dictations, but occasionally words are mistranscribed.

## 2018-07-17 NOTE — TELEPHONE ENCOUNTER
Called [facility] pharmacy. Modified Rx: Take one tablet once daily at bedtime, #30.

## 2018-07-27 ENCOUNTER — TELEPHONE (OUTPATIENT)
Dept: ONCOLOGY | Facility: CLINIC | Age: 63
End: 2018-07-27

## 2018-07-27 NOTE — TELEPHONE ENCOUNTER
----- Message from Leslie Cross sent at 7/27/2018  9:08 AM EDT -----  Regarding: YASMINE-CONSTIPATION  Contact: [phone]  Patient's daughter Shaw called he is having trouble passing a stool and it's painful what can he try to help with this? Please call.

## 2018-08-06 ENCOUNTER — OFFICE VISIT (OUTPATIENT)
Dept: ONCOLOGY | Facility: CLINIC | Age: 63
End: 2018-08-06

## 2018-08-06 ENCOUNTER — INFUSION (OUTPATIENT)
Dept: ONCOLOGY | Facility: HOSPITAL | Age: 63
End: 2018-08-06

## 2018-08-06 VITALS
OXYGEN SATURATION: 100 % | SYSTOLIC BLOOD PRESSURE: 137 MMHG | HEART RATE: 73 BPM | RESPIRATION RATE: 16 BRPM | HEIGHT: 68 IN | TEMPERATURE: 97.5 F | DIASTOLIC BLOOD PRESSURE: 73 MMHG | BODY MASS INDEX: 23.19 KG/M2 | WEIGHT: 153 LBS

## 2018-08-06 DIAGNOSIS — C64.2 MALIGNANT NEOPLASM OF LEFT KIDNEY (HCC): Primary | ICD-10-CM

## 2018-08-06 DIAGNOSIS — C77.5 PROSTATE CANCER METASTATIC TO INTRAPELVIC LYMPH NODE (HCC): ICD-10-CM

## 2018-08-06 DIAGNOSIS — C61 PROSTATE CANCER METASTATIC TO INTRAPELVIC LYMPH NODE (HCC): ICD-10-CM

## 2018-08-06 DIAGNOSIS — C61 PROSTATE CANCER METASTATIC TO INTRAPELVIC LYMPH NODE (HCC): Primary | ICD-10-CM

## 2018-08-06 DIAGNOSIS — C77.5 PROSTATE CANCER METASTATIC TO INTRAPELVIC LYMPH NODE (HCC): Primary | ICD-10-CM

## 2018-08-06 LAB
ALBUMIN SERPL-MCNC: 3.72 G/DL (ref 3.2–4.8)
ALBUMIN/GLOB SERPL: 1.3 G/DL (ref 1.5–2.5)
ALP SERPL-CCNC: 85 U/L (ref 25–100)
ALT SERPL W P-5'-P-CCNC: 24 U/L (ref 7–40)
ANION GAP SERPL CALCULATED.3IONS-SCNC: 9 MMOL/L (ref 3–11)
AST SERPL-CCNC: 54 U/L (ref 0–33)
BILIRUB SERPL-MCNC: 0.5 MG/DL (ref 0.3–1.2)
BUN BLD-MCNC: 38 MG/DL (ref 9–23)
BUN/CREAT SERPL: 27.1 (ref 7–25)
CALCIUM SPEC-SCNC: 8.8 MG/DL (ref 8.7–10.4)
CHLORIDE SERPL-SCNC: 105 MMOL/L (ref 99–109)
CO2 SERPL-SCNC: 26 MMOL/L (ref 20–31)
CREAT BLD-MCNC: 1.4 MG/DL (ref 0.6–1.3)
ERYTHROCYTE [DISTWIDTH] IN BLOOD BY AUTOMATED COUNT: 20 % (ref 11.3–14.5)
GFR SERPL CREATININE-BSD FRML MDRD: 62 ML/MIN/1.73
GLOBULIN UR ELPH-MCNC: 2.8 GM/DL
GLUCOSE BLD-MCNC: 173 MG/DL (ref 70–100)
HCT VFR BLD AUTO: 26.7 % (ref 38.9–50.9)
HGB BLD-MCNC: 8.2 G/DL (ref 13.1–17.5)
LYMPHOCYTES # BLD AUTO: 1.2 10*3/MM3 (ref 0.6–4.8)
LYMPHOCYTES NFR BLD AUTO: 11.4 % (ref 24–44)
MCH RBC QN AUTO: 27.4 PG (ref 27–31)
MCHC RBC AUTO-ENTMCNC: 30.7 G/DL (ref 32–36)
MCV RBC AUTO: 89.2 FL (ref 80–99)
MONOCYTES # BLD AUTO: 0.2 10*3/MM3 (ref 0–1)
MONOCYTES NFR BLD AUTO: 2.2 % (ref 0–12)
NEUTROPHILS # BLD AUTO: 8.8 10*3/MM3 (ref 1.5–8.3)
NEUTROPHILS NFR BLD AUTO: 86.4 % (ref 41–71)
PLATELET # BLD AUTO: 356 10*3/MM3 (ref 150–450)
PMV BLD AUTO: 6.9 FL (ref 6–12)
POTASSIUM BLD-SCNC: 4.8 MMOL/L (ref 3.5–5.5)
PROT SERPL-MCNC: 6.5 G/DL (ref 5.7–8.2)
PSA SERPL-MCNC: 0.31 NG/ML (ref 0–4)
RBC # BLD AUTO: 3 10*6/MM3 (ref 4.2–5.76)
SODIUM BLD-SCNC: 140 MMOL/L (ref 132–146)
WBC NRBC COR # BLD: 10.2 10*3/MM3 (ref 3.5–10.8)

## 2018-08-06 PROCEDURE — 85025 COMPLETE CBC W/AUTO DIFF WBC: CPT

## 2018-08-06 PROCEDURE — 84153 ASSAY OF PSA TOTAL: CPT

## 2018-08-06 PROCEDURE — 99214 OFFICE O/P EST MOD 30 MIN: CPT | Performed by: INTERNAL MEDICINE

## 2018-08-06 PROCEDURE — 96413 CHEMO IV INFUSION 1 HR: CPT

## 2018-08-06 PROCEDURE — 80053 COMPREHEN METABOLIC PANEL: CPT

## 2018-08-06 PROCEDURE — 25010000002 DOCETAXEL 20 MG/ML CONCENTRATION 1 ML VIAL: Performed by: INTERNAL MEDICINE

## 2018-08-06 PROCEDURE — 25010000002 DOCETAXEL 20 MG/ML CONCENTRATION 4 ML VIAL: Performed by: INTERNAL MEDICINE

## 2018-08-06 RX ORDER — SODIUM CHLORIDE 9 MG/ML
250 INJECTION, SOLUTION INTRAVENOUS ONCE
Status: CANCELLED | OUTPATIENT
Start: 2018-08-06

## 2018-08-06 RX ORDER — SODIUM CHLORIDE 9 MG/ML
250 INJECTION, SOLUTION INTRAVENOUS ONCE
Status: DISCONTINUED | OUTPATIENT
Start: 2018-08-06 | End: 2018-08-06 | Stop reason: HOSPADM

## 2018-08-06 RX ADMIN — DOCETAXEL 135 MG: 20 INJECTION, SOLUTION, CONCENTRATE INTRAVENOUS at 11:36

## 2018-08-06 NOTE — PROGRESS NOTES
"     Prostate cancer metastatic to intrapelvic lymph node (CMS/HCC)    12/13/2017 Initial Diagnosis     Prostate cancer metastatic to intrapelvic lymph node       1/30/2018 -  Hormonal Therapy     Received Julia with Dr. Sellers          - 1/30/2018 Radiation     Radiation OncologyTreatment Course:  Sadi Stone Jr. received 45 cGy in 18 fractions with Dr. Rosas at [facility]            REASON FOR VISIT: Metastatic Prostate Cancer    HISTORY OF PRESENT ILLNESS:   63 y.o.  male presents today for Follow-up of his locally advanced prostate cancer.  He completed 5 cycles of Taxotere.  He is doing well with minimal back pain..  Lower extremity edema still present due to lymphedema.  Back pain much better.  Having some issues with sleep. Constipation due to pain meds    Past medical history, social history and family history was reviewed and unchanged from prior visit.    Review of Systems:    Review of Systems   Constitutional: Positive for fatigue.   HENT:  Negative.    Eyes: Negative.    Respiratory: Negative.    Cardiovascular: Positive for leg swelling.   Gastrointestinal: Positive for constipation.   Endocrine: Negative.    Genitourinary: Negative.     Musculoskeletal: Negative.    Skin: Negative.    Neurological: Negative.    Hematological: Negative.    Psychiatric/Behavioral: Negative.       A comprehensive 14 point review of systems was performed and was negative except as mentioned.      Medications:  The current medication list was reviewed in the EMR    ALLERGIES:    Allergies   Allergen Reactions   • Bactrim [Sulfamethoxazole-Trimethoprim] Anaphylaxis         Physical Exam    VITAL SIGNS:  /73 Comment: LUE  Pulse 73   Temp 97.5 °F (36.4 °C) (Oral)   Resp 16   Ht 172.7 cm (68\")   Wt 69.4 kg (153 lb)   SpO2 100% Comment: RA  BMI 23.26 kg/m²     Wt Readings from Last 3 Encounters:   08/06/18 69.4 kg (153 lb)   07/16/18 70.3 kg (155 lb)   06/20/18 68.9 kg (152 lb)        " Performance Status: 0    General: well appearing, in no acute distress  HEENT: sclera anicteric, oropharynx clear, neck is supple  Lymphatics: no cervical, supraclavicular, or axillary adenopathy  Cardiovascular: regular rate and rhythm, no murmurs, rubs or gallops  Lungs: clear to auscultation bilaterally  Abdomen: soft, nontender, nondistended.  No palpable organomegaly  Extremities: + Left  lower extremity edema  Skin: no rashes, lesions, bruising, or petechiae  Msk:  Shows no weakness of the large muscle groups  Psych: Mood is stable    Lab Results   Component Value Date    HGB 9.3 (L) 07/16/2018    HCT 29.8 (L) 07/16/2018    MCV 90.8 07/16/2018     07/16/2018    WBC 11.90 (H) 07/16/2018    NEUTROABS 10.20 (H) 07/16/2018    LYMPHSABS 1.20 07/16/2018    MONOSABS 0.60 07/16/2018     Lab Results   Component Value Date    GLUCOSE 121 (H) 07/16/2018    BUN 31 (H) 07/16/2018    CREATININE 1.28 07/16/2018     07/16/2018    K 4.2 07/16/2018     07/16/2018    CO2 26.0 07/16/2018    CALCIUM 9.0 07/16/2018    PROTEINTOT 6.3 07/16/2018    ALBUMIN 3.69 07/16/2018    BILITOT 0.6 07/16/2018    ALKPHOS 82 07/16/2018    AST 36 (H) 07/16/2018    ALT 23 07/16/2018       Lab Results   Component Value Date    PSA 0.300 07/16/2018    PSA 0.410 06/20/2018    PSA 0.700 05/30/2018    PSA 0.480 05/07/2018    PSA 4.850 (H) 10/06/2017   ]    Xr Ankle 3+ View Right    Result Date: 5/25/2018  No acute bony abnormality identified.  D:  05/25/2018 E:  05/25/2018  This report was finalized on 5/25/2018 5:50 PM by Dr. Indy Whitlock MD.      Ct Chest With Contrast    Result Date: 5/4/2018  Retrocrural adenopathy as well as abnormally enlarged lymph nodes posterior to the mid esophagus. Small retroperitoneal lymph nodes are identified with mild obstruction seen of the left renal collecting system. Stent placed in the left ureter with satisfactory position of the stent. Continued followup is recommended.  D:  05/03/2018 E:   05/03/2018  This report was finalized on 5/4/2018 1:37 PM by Dr. Indy Whitlock MD.      Nm Bone Scan Whole Body    Result Date: 5/3/2018  There are extensive arthritic changes, but there is a single focal abnormality in a nonarthritic distribution. That is increased activity in a planar distribution involving T12 or L1. Review of CT scan of the abdomen and pelvis performed on same day demonstrates a very subtle abnormality with sclerosis in the superior aspect of the L1 vertebral body and surrounding decreased mineralization. This is not the classic picture of prostate metastatic disease.  D:  05/03/2018 E:  05/03/2018     This report was finalized on 5/3/2018 4:22 PM by Dr. Tung Moran MD.      Ct Abdomen Pelvis With Contrast    Result Date: 7/9/2018  Postradiation changes identified within the pelvis with stranding seen within the fat as well as wall thickening of the bladder and rectum. There is some improvement seen of several of the retroperitoneal lymph nodes. Several of the retroperitoneal lymph nodes also remains stable in size and appearance. There is no evidence of progression of disease.  D:  07/09/2018 E:  07/09/2018   This report was finalized on 7/9/2018 1:01 PM by Dr. Indy Whitlock MD.      Ct Abdomen Pelvis With Contrast    Result Date: 5/4/2018  Retrocrural adenopathy as well as abnormally enlarged lymph nodes posterior to the mid esophagus. Small retroperitoneal lymph nodes are identified with mild obstruction seen of the left renal collecting system. Stent placed in the left ureter with satisfactory position of the stent. Continued followup is recommended.  D:  05/03/2018 E:  05/03/2018  This report was finalized on 5/4/2018 1:37 PM by Dr. Indy Whitlock MD.      Xr Chest Pa & Lateral    Result Date: 3/22/2018  No acute cardiopulmonary process.  D:  03/22/2018 E:  03/22/2018  This report was finalized on 3/22/2018 12:39 PM by Dr. Josh Peace.        Assessment/Plan    1.  Locally  advanced metastatic prostate cancer: Continue Eligard .  Continue cycle #5 of Taxotere with no dose reduction.  Plan for 6 cycles with no dose reduction for his next cycle.  I will see him back in 3 weeks.  I reviewed his CAT scans today which shows improvement in his lymphadenopathy. May consider abiraterone after completion of chemo    2.  Pelvic pain: Unclear if this is related to his disease or the stents. It appears better..    3.  Insomnia: will start restiril    4.  Left lower extremity edema secondary to lymphadenopathy      Return to clinic in 3 weeks for cycle #6 of Taxotere.      I spent 25 minutes on the patient's plan and care with over 50% spent counseling the patient regarding the recommendations of the tumor board and treatment going forward.        Jolynn Dao MD  [facility] Hematology and Oncology    8/6/2018     Return on: 08/27/18  Return in (Approximately): 3 weeks, Schedule with next infusion    Orders Placed This Encounter   Procedures   • PSA     Standing Status:   Future     Standing Expiration Date:   12/17/2019   • Testosterone, Free, Total     Standing Status:   Future     Standing Expiration Date:   12/17/2019   • PSA Diagnostic     Standing Status:   Future     Standing Expiration Date:   8/6/2019   • Comprehensive metabolic panel     Standing Status:   Future     Standing Expiration Date:   8/6/2019   • CBC and Differential     Standing Status:   Future     Standing Expiration Date:   8/6/2019     Order Specific Question:   Manual Differential     Answer:   No         Please note that portions of this note may have been completed with a voice recognition program. Efforts were made to edit the dictations, but occasionally words are mistranscribed.

## 2018-08-20 ENCOUNTER — TELEPHONE (OUTPATIENT)
Dept: ONCOLOGY | Facility: CLINIC | Age: 63
End: 2018-08-20

## 2018-08-20 NOTE — TELEPHONE ENCOUNTER
----- Message from Diana Preston RN sent at 8/20/2018  1:51 PM EDT -----  Regarding: Andre- left leg swelling   Contact: [phone]  Pt called @1pm. Complains of left leg swelling from his waist to foot. Please call back.

## 2018-08-20 NOTE — TELEPHONE ENCOUNTER
Returned call to patient and educated on lymphedema due to radiation and elevation for swelling.  Patient verbalized understanding.

## 2018-08-27 ENCOUNTER — INFUSION (OUTPATIENT)
Dept: ONCOLOGY | Facility: HOSPITAL | Age: 63
End: 2018-08-27

## 2018-08-27 ENCOUNTER — OFFICE VISIT (OUTPATIENT)
Dept: ONCOLOGY | Facility: CLINIC | Age: 63
End: 2018-08-27

## 2018-08-27 VITALS
OXYGEN SATURATION: 99 % | BODY MASS INDEX: 23.79 KG/M2 | SYSTOLIC BLOOD PRESSURE: 147 MMHG | HEIGHT: 68 IN | RESPIRATION RATE: 16 BRPM | HEART RATE: 65 BPM | TEMPERATURE: 97.3 F | WEIGHT: 157 LBS | DIASTOLIC BLOOD PRESSURE: 81 MMHG

## 2018-08-27 DIAGNOSIS — C77.5 PROSTATE CANCER METASTATIC TO INTRAPELVIC LYMPH NODE (HCC): Primary | ICD-10-CM

## 2018-08-27 DIAGNOSIS — C61 PROSTATE CANCER METASTATIC TO INTRAPELVIC LYMPH NODE (HCC): Primary | ICD-10-CM

## 2018-08-27 DIAGNOSIS — C77.5 PROSTATE CANCER METASTATIC TO INTRAPELVIC LYMPH NODE (HCC): ICD-10-CM

## 2018-08-27 DIAGNOSIS — C61 PROSTATE CANCER METASTATIC TO INTRAPELVIC LYMPH NODE (HCC): ICD-10-CM

## 2018-08-27 LAB
ALBUMIN SERPL-MCNC: 3.54 G/DL (ref 3.2–4.8)
ALBUMIN/GLOB SERPL: 1.3 G/DL (ref 1.5–2.5)
ALP SERPL-CCNC: 92 U/L (ref 25–100)
ALT SERPL W P-5'-P-CCNC: 19 U/L (ref 7–40)
ANION GAP SERPL CALCULATED.3IONS-SCNC: 6 MMOL/L (ref 3–11)
AST SERPL-CCNC: 30 U/L (ref 0–33)
BILIRUB SERPL-MCNC: 0.4 MG/DL (ref 0.3–1.2)
BUN BLD-MCNC: 33 MG/DL (ref 9–23)
BUN/CREAT SERPL: 25.8 (ref 7–25)
CALCIUM SPEC-SCNC: 9.1 MG/DL (ref 8.7–10.4)
CHLORIDE SERPL-SCNC: 107 MMOL/L (ref 99–109)
CO2 SERPL-SCNC: 25 MMOL/L (ref 20–31)
CREAT BLD-MCNC: 1.28 MG/DL (ref 0.6–1.3)
ERYTHROCYTE [DISTWIDTH] IN BLOOD BY AUTOMATED COUNT: 20.3 % (ref 11.3–14.5)
GFR SERPL CREATININE-BSD FRML MDRD: 68 ML/MIN/1.73
GLOBULIN UR ELPH-MCNC: 2.7 GM/DL
GLUCOSE BLD-MCNC: 147 MG/DL (ref 70–100)
HCT VFR BLD AUTO: 28.5 % (ref 38.9–50.9)
HGB BLD-MCNC: 8.7 G/DL (ref 13.1–17.5)
LYMPHOCYTES # BLD AUTO: 1.3 10*3/MM3 (ref 0.6–4.8)
LYMPHOCYTES NFR BLD AUTO: 11.8 % (ref 24–44)
MCH RBC QN AUTO: 27.1 PG (ref 27–31)
MCHC RBC AUTO-ENTMCNC: 30.7 G/DL (ref 32–36)
MCV RBC AUTO: 88.4 FL (ref 80–99)
MONOCYTES # BLD AUTO: 0.3 10*3/MM3 (ref 0–1)
MONOCYTES NFR BLD AUTO: 2.6 % (ref 0–12)
NEUTROPHILS # BLD AUTO: 9.6 10*3/MM3 (ref 1.5–8.3)
NEUTROPHILS NFR BLD AUTO: 85.6 % (ref 41–71)
PLATELET # BLD AUTO: 348 10*3/MM3 (ref 150–450)
PMV BLD AUTO: 6.9 FL (ref 6–12)
POTASSIUM BLD-SCNC: 4.9 MMOL/L (ref 3.5–5.5)
PROT SERPL-MCNC: 6.2 G/DL (ref 5.7–8.2)
PSA SERPL-MCNC: 0.37 NG/ML (ref 0–4)
RBC # BLD AUTO: 3.22 10*6/MM3 (ref 4.2–5.76)
SODIUM BLD-SCNC: 138 MMOL/L (ref 132–146)
WBC NRBC COR # BLD: 11.2 10*3/MM3 (ref 3.5–10.8)

## 2018-08-27 PROCEDURE — 84153 ASSAY OF PSA TOTAL: CPT

## 2018-08-27 PROCEDURE — 99215 OFFICE O/P EST HI 40 MIN: CPT | Performed by: INTERNAL MEDICINE

## 2018-08-27 PROCEDURE — 96413 CHEMO IV INFUSION 1 HR: CPT

## 2018-08-27 PROCEDURE — 80053 COMPREHEN METABOLIC PANEL: CPT

## 2018-08-27 PROCEDURE — 85025 COMPLETE CBC W/AUTO DIFF WBC: CPT

## 2018-08-27 PROCEDURE — 25010000002 DOCETAXEL 20 MG/ML CONCENTRATION 1 ML VIAL: Performed by: INTERNAL MEDICINE

## 2018-08-27 PROCEDURE — 25010000002 DOCETAXEL 20 MG/ML CONCENTRATION 4 ML VIAL: Performed by: INTERNAL MEDICINE

## 2018-08-27 RX ORDER — SODIUM CHLORIDE 9 MG/ML
250 INJECTION, SOLUTION INTRAVENOUS ONCE
Status: CANCELLED | OUTPATIENT
Start: 2018-08-27

## 2018-08-27 RX ORDER — SODIUM CHLORIDE 9 MG/ML
250 INJECTION, SOLUTION INTRAVENOUS ONCE
Status: COMPLETED | OUTPATIENT
Start: 2018-08-27 | End: 2018-08-27

## 2018-08-27 RX ADMIN — SODIUM CHLORIDE 250 ML: 9 INJECTION, SOLUTION INTRAVENOUS at 10:18

## 2018-08-27 RX ADMIN — DOCETAXEL 135 MG: 80 INJECTION, SOLUTION, CONCENTRATE INTRAVENOUS at 10:20

## 2018-08-27 NOTE — PROGRESS NOTES
"     Prostate cancer metastatic to intrapelvic lymph node (CMS/HCC)    12/13/2017 Initial Diagnosis     Prostate cancer metastatic to intrapelvic lymph node        - 1/30/2018 Radiation     Radiation OncologyTreatment Course:  Sadi Stone Jr. received 45 cGy in 18 fractions with Dr. Rosas at [facility]         1/30/2018 -  Hormonal Therapy     Received Eliguard every 6 months            REASON FOR VISIT: Metastatic Prostate Cancer    HISTORY OF PRESENT ILLNESS:   63 y.o.  male presents today for Follow-up of his locally advanced prostate cancer.  He completed 5 cycles of Taxotere.  He is doing well with minimal back pain..  Lower extremity edema still present due to lymphedema.  Back pain much better.  His cycling through constipation and diarrhea.  Having more issues with urinary symptoms.      Past medical history, social history and family history was reviewed and unchanged from prior visit.    Review of Systems:    Review of Systems   Constitutional: Positive for fatigue.   HENT:  Negative.    Eyes: Negative.    Respiratory: Negative.    Cardiovascular: Positive for leg swelling.   Gastrointestinal: Positive for constipation.   Endocrine: Negative.    Genitourinary: Negative.     Musculoskeletal: Negative.    Skin: Negative.    Neurological: Negative.    Hematological: Negative.    Psychiatric/Behavioral: Negative.       A comprehensive 14 point review of systems was performed and was negative except as mentioned.      Medications:  The current medication list was reviewed in the EMR    ALLERGIES:    Allergies   Allergen Reactions   • Bactrim [Sulfamethoxazole-Trimethoprim] Anaphylaxis         Physical Exam    VITAL SIGNS:  /81 Comment: RUE  Pulse 65   Temp 97.3 °F (36.3 °C) (Temporal Artery )   Resp 16   Ht 172.7 cm (68\")   Wt 71.2 kg (157 lb)   SpO2 99% Comment: RA  BMI 23.87 kg/m²     Wt Readings from Last 3 Encounters:   08/27/18 71.2 kg (157 lb)   08/06/18 69.4 kg (153 lb) "   07/16/18 70.3 kg (155 lb)        Performance Status: 0    General: well appearing, in no acute distress  HEENT: sclera anicteric, oropharynx clear, neck is supple  Lymphatics: no cervical, supraclavicular, or axillary adenopathy  Cardiovascular: regular rate and rhythm, no murmurs, rubs or gallops  Lungs: clear to auscultation bilaterally  Abdomen: soft, nontender, nondistended.  No palpable organomegaly  Extremities: + Left  lower extremity edema  Skin: no rashes, lesions, bruising, or petechiae  Msk:  Shows no weakness of the large muscle groups  Psych: Mood is stable    Lab Results   Component Value Date    HGB 8.2 (L) 08/06/2018    HCT 26.7 (L) 08/06/2018    MCV 89.2 08/06/2018     08/06/2018    WBC 10.20 08/06/2018    NEUTROABS 8.80 (H) 08/06/2018    LYMPHSABS 1.20 08/06/2018    MONOSABS 0.20 08/06/2018     Lab Results   Component Value Date    GLUCOSE 173 (H) 08/06/2018    BUN 38 (H) 08/06/2018    CREATININE 1.40 (H) 08/06/2018     08/06/2018    K 4.8 08/06/2018     08/06/2018    CO2 26.0 08/06/2018    CALCIUM 8.8 08/06/2018    PROTEINTOT 6.5 08/06/2018    ALBUMIN 3.72 08/06/2018    BILITOT 0.5 08/06/2018    ALKPHOS 85 08/06/2018    AST 54 (H) 08/06/2018    ALT 24 08/06/2018       Lab Results   Component Value Date    PSA 0.310 08/06/2018    PSA 0.300 07/16/2018    PSA 0.410 06/20/2018    PSA 0.700 05/30/2018    PSA 0.480 05/07/2018   ]    Xr Ankle 3+ View Right    Result Date: 5/25/2018  No acute bony abnormality identified.  D:  05/25/2018 E:  05/25/2018  This report was finalized on 5/25/2018 5:50 PM by Dr. Indy Whitlock MD.      Ct Chest With Contrast    Result Date: 5/4/2018  Retrocrural adenopathy as well as abnormally enlarged lymph nodes posterior to the mid esophagus. Small retroperitoneal lymph nodes are identified with mild obstruction seen of the left renal collecting system. Stent placed in the left ureter with satisfactory position of the stent. Continued followup is  recommended.  D:  05/03/2018 E:  05/03/2018  This report was finalized on 5/4/2018 1:37 PM by Dr. Indy Whitlock MD.      Nm Bone Scan Whole Body    Result Date: 5/3/2018  There are extensive arthritic changes, but there is a single focal abnormality in a nonarthritic distribution. That is increased activity in a planar distribution involving T12 or L1. Review of CT scan of the abdomen and pelvis performed on same day demonstrates a very subtle abnormality with sclerosis in the superior aspect of the L1 vertebral body and surrounding decreased mineralization. This is not the classic picture of prostate metastatic disease.  D:  05/03/2018 E:  05/03/2018     This report was finalized on 5/3/2018 4:22 PM by Dr. Tung Moran MD.      Ct Abdomen Pelvis With Contrast    Result Date: 7/9/2018  Postradiation changes identified within the pelvis with stranding seen within the fat as well as wall thickening of the bladder and rectum. There is some improvement seen of several of the retroperitoneal lymph nodes. Several of the retroperitoneal lymph nodes also remains stable in size and appearance. There is no evidence of progression of disease.  D:  07/09/2018 E:  07/09/2018   This report was finalized on 7/9/2018 1:01 PM by Dr. Indy Whitlock MD.      Ct Abdomen Pelvis With Contrast    Result Date: 5/4/2018  Retrocrural adenopathy as well as abnormally enlarged lymph nodes posterior to the mid esophagus. Small retroperitoneal lymph nodes are identified with mild obstruction seen of the left renal collecting system. Stent placed in the left ureter with satisfactory position of the stent. Continued followup is recommended.  D:  05/03/2018 E:  05/03/2018  This report was finalized on 5/4/2018 1:37 PM by Dr. Indy Whitlock MD.      Xr Chest Pa & Lateral    Result Date: 3/22/2018  No acute cardiopulmonary process.  D:  03/22/2018 E:  03/22/2018  This report was finalized on 3/22/2018 12:39 PM by Dr. Josh Peace.         Assessment/Plan    1.  Locally advanced metastatic prostate cancer: Continue Eligard .  Continue cycle #6 of Taxotere with no dose reduction.  This is his last cycle of chemotherapy.  After this I will place him on Zytiga and prednisone.  I like to see how he does before we start that drug.  Clinically otherwise still having issues with left leg swelling secondary to lymphedema.  I spoke to him about the plan going forward including switching him to oral medications after this cycle.    2.  Pelvic pain: Unclear if this is related to his disease or the stents. Its better.    3.  Insomnia: will start restoril    4.  Left lower extremity edema secondary to lymphadenopathy      Return to clinic in 3 weeks for cycle #6 of Taxotere.      I spent 40  minutes on the patient's plan and care with over 50% spent counseling the patient      Jolynn Dao MD  [facility] Hematology and Oncology    8/27/2018     Return on: 09/24/18  Return in (Approximately): 1 month    Orders Placed This Encounter   Procedures   • PSA Diagnostic     Standing Status:   Future     Standing Expiration Date:   8/27/2019   • Comprehensive metabolic panel     Standing Status:   Future     Standing Expiration Date:   8/27/2019   • CBC and Differential     Standing Status:   Future     Standing Expiration Date:   8/27/2019     Order Specific Question:   Manual Differential     Answer:   No         Please note that portions of this note may have been completed with a voice recognition program. Efforts were made to edit the dictations, but occasionally words are mistranscribed.

## 2018-09-05 ENCOUNTER — TELEPHONE (OUTPATIENT)
Dept: ONCOLOGY | Facility: CLINIC | Age: 63
End: 2018-09-05

## 2018-09-05 ENCOUNTER — OFFICE VISIT (OUTPATIENT)
Dept: FAMILY MEDICINE CLINIC | Facility: CLINIC | Age: 63
End: 2018-09-05

## 2018-09-05 VITALS
OXYGEN SATURATION: 99 % | HEART RATE: 96 BPM | HEIGHT: 68 IN | WEIGHT: 155.6 LBS | BODY MASS INDEX: 23.58 KG/M2 | DIASTOLIC BLOOD PRESSURE: 68 MMHG | SYSTOLIC BLOOD PRESSURE: 108 MMHG

## 2018-09-05 DIAGNOSIS — N40.1 BENIGN PROSTATIC HYPERPLASIA WITH NOCTURIA: ICD-10-CM

## 2018-09-05 DIAGNOSIS — R79.89 LOW VITAMIN D LEVEL: ICD-10-CM

## 2018-09-05 DIAGNOSIS — C61 PROSTATE CANCER METASTATIC TO INTRAPELVIC LYMPH NODE (HCC): ICD-10-CM

## 2018-09-05 DIAGNOSIS — R35.1 BENIGN PROSTATIC HYPERPLASIA WITH NOCTURIA: ICD-10-CM

## 2018-09-05 DIAGNOSIS — C77.5 PROSTATE CANCER METASTATIC TO INTRAPELVIC LYMPH NODE (HCC): ICD-10-CM

## 2018-09-05 DIAGNOSIS — N30.00 ACUTE CYSTITIS WITHOUT HEMATURIA: ICD-10-CM

## 2018-09-05 DIAGNOSIS — Z51.81 THERAPEUTIC DRUG MONITORING: ICD-10-CM

## 2018-09-05 DIAGNOSIS — Z00.00 INITIAL MEDICARE ANNUAL WELLNESS VISIT: Primary | ICD-10-CM

## 2018-09-05 DIAGNOSIS — Z23 NEED FOR PNEUMOCOCCAL VACCINE: ICD-10-CM

## 2018-09-05 DIAGNOSIS — C64.2 MALIGNANT NEOPLASM OF LEFT KIDNEY (HCC): Primary | ICD-10-CM

## 2018-09-05 DIAGNOSIS — R53.83 FATIGUE, UNSPECIFIED TYPE: ICD-10-CM

## 2018-09-05 DIAGNOSIS — C61 MALIGNANT TUMOR OF PROSTATE (HCC): ICD-10-CM

## 2018-09-05 DIAGNOSIS — G47.00 INSOMNIA, UNSPECIFIED TYPE: ICD-10-CM

## 2018-09-05 PROBLEM — C64.9 PRIMARY MALIGNANT NEOPLASM OF KIDNEY (HCC): Status: RESOLVED | Noted: 2017-11-04 | Resolved: 2018-09-05

## 2018-09-05 LAB
BILIRUB BLD-MCNC: ABNORMAL MG/DL
CLARITY, POC: ABNORMAL
COLOR UR: ABNORMAL
GLUCOSE UR STRIP-MCNC: NEGATIVE MG/DL
KETONES UR QL: NEGATIVE
LEUKOCYTE EST, POC: ABNORMAL
NITRITE UR-MCNC: NEGATIVE MG/ML
PH UR: 6 [PH] (ref 5–8)
PROT UR STRIP-MCNC: ABNORMAL MG/DL
RBC # UR STRIP: ABNORMAL /UL
SP GR UR: 1.02 (ref 1–1.03)
UROBILINOGEN UR QL: ABNORMAL

## 2018-09-05 PROCEDURE — G0438 PPPS, INITIAL VISIT: HCPCS | Performed by: NURSE PRACTITIONER

## 2018-09-05 PROCEDURE — 99397 PER PM REEVAL EST PAT 65+ YR: CPT | Performed by: NURSE PRACTITIONER

## 2018-09-05 PROCEDURE — 81003 URINALYSIS AUTO W/O SCOPE: CPT | Performed by: NURSE PRACTITIONER

## 2018-09-05 PROCEDURE — G0009 ADMIN PNEUMOCOCCAL VACCINE: HCPCS | Performed by: NURSE PRACTITIONER

## 2018-09-05 PROCEDURE — 90670 PCV13 VACCINE IM: CPT | Performed by: NURSE PRACTITIONER

## 2018-09-05 PROCEDURE — 96160 PT-FOCUSED HLTH RISK ASSMT: CPT | Performed by: NURSE PRACTITIONER

## 2018-09-05 RX ORDER — CIPROFLOXACIN 500 MG/1
500 TABLET, FILM COATED ORAL 2 TIMES DAILY
Qty: 20 TABLET | Refills: 0 | Status: SHIPPED | OUTPATIENT
Start: 2018-09-05 | End: 2018-09-27

## 2018-09-05 RX ORDER — TAMSULOSIN HYDROCHLORIDE 0.4 MG/1
1 CAPSULE ORAL NIGHTLY
Qty: 90 CAPSULE | Refills: 3 | Status: SHIPPED | OUTPATIENT
Start: 2018-09-05 | End: 2019-01-01 | Stop reason: SDUPTHER

## 2018-09-05 RX ORDER — ZOLPIDEM TARTRATE 5 MG/1
5 TABLET ORAL NIGHTLY PRN
Qty: 30 TABLET | Refills: 0 | Status: SHIPPED | OUTPATIENT
Start: 2018-09-05 | End: 2018-01-01 | Stop reason: HOSPADM

## 2018-09-05 NOTE — PATIENT INSTRUCTIONS
Fall Prevention in the Home  Falls can cause injuries and can affect people from all age groups. There are many simple things that you can do to make your home safe and to help prevent falls.  What can I do on the outside of my home?  · Regularly repair the edges of walkways and driveways and fix any cracks.  · Remove high doorway thresholds.  · Trim any shrubbery on the main path into your home.  · Use bright outdoor lighting.  · Clear walkways of debris and clutter, including tools and rocks.  · Regularly check that handrails are securely fastened and in good repair. Both sides of any steps should have handrails.  · Install guardrails along the edges of any raised decks or porches.  · Have leaves, snow, and ice cleared regularly.  · Use sand or salt on walkways during winter months.  · In the garage, clean up any spills right away, including grease or oil spills.  What can I do in the bathroom?  · Use night lights.  · Install grab bars by the toilet and in the tub and shower. Do not use towel bars as grab bars.  · Use non-skid mats or decals on the floor of the tub or shower.  · If you need to sit down while you are in the shower, use a plastic, non-slip stool.  · Keep the floor dry. Immediately clean up any water that spills on the floor.  · Remove soap buildup in the tub or shower on a regular basis.  · Attach bath mats securely with double-sided non-slip rug tape.  · Remove throw rugs and other tripping hazards from the floor.  What can I do in the bedroom?  · Use night lights.  · Make sure that a bedside light is easy to reach.  · Do not use oversized bedding that drapes onto the floor.  · Have a firm chair that has side arms to use for getting dressed.  · Remove throw rugs and other tripping hazards from the floor.  What can I do in the kitchen?  · Clean up any spills right away.  · Avoid walking on wet floors.  · Place frequently used items in easy-to-reach places.  · If you need to reach for something above  you, use a sturdy step stool that has a grab bar.  · Keep electrical cables out of the way.  · Do not use floor polish or wax that makes floors slippery. If you have to use wax, make sure that it is non-skid floor wax.  · Remove throw rugs and other tripping hazards from the floor.  What can I do in the stairways?  · Do not leave any items on the stairs.  · Make sure that there are handrails on both sides of the stairs. Fix handrails that are broken or loose. Make sure that handrails are as long as the stairways.  · Check any carpeting to make sure that it is firmly attached to the stairs. Fix any carpet that is loose or worn.  · Avoid having throw rugs at the top or bottom of stairways, or secure the rugs with carpet tape to prevent them from moving.  · Make sure that you have a light switch at the top of the stairs and the bottom of the stairs. If you do not have them, have them installed.  What are some other fall prevention tips?  · Wear closed-toe shoes that fit well and support your feet. Wear shoes that have rubber soles or low heels.  · When you use a stepladder, make sure that it is completely opened and that the sides are firmly locked. Have someone hold the ladder while you are using it. Do not climb a closed stepladder.  · Add color or contrast paint or tape to grab bars and handrails in your home. Place contrasting color strips on the first and last steps.  · Use mobility aids as needed, such as canes, walkers, scooters, and crutches.  · Turn on lights if it is dark. Replace any light bulbs that burn out.  · Set up furniture so that there are clear paths. Keep the furniture in the same spot.  · Fix any uneven floor surfaces.  · Choose a carpet design that does not hide the edge of steps of a stairway.  · Be aware of any and all pets.  · Review your medicines with your healthcare provider. Some medicines can cause dizziness or changes in blood pressure, which increase your risk of falling.  Talk with  your health care provider about other ways that you can decrease your risk of falls. This may include working with a physical therapist or  to improve your strength, balance, and endurance.  This information is not intended to replace advice given to you by your health care provider. Make sure you discuss any questions you have with your health care provider.  Document Released: 12/08/2003 Document Revised: 05/16/2017 Document Reviewed: 01/22/2016  Elsevier Interactive Patient Education © 2018 [employer]

## 2018-09-05 NOTE — TELEPHONE ENCOUNTER
Mendy Antonio took call and forward message that daughter called and reported that patient is having burning with urination.  I returned call and talked to patient and her reported that for the last couple of days he had burning only with urination and today he has burning sensation continuously and lower abdominal pain.  Patient reported that is he has a office visit with PCP.  Advise patient to inform PCP of possible UTI.  Orders have been placed for UA.  Patient aware and verbalized understanding.

## 2018-09-05 NOTE — PROGRESS NOTES
QUICK REFERENCE INFORMATION:  The ABCs of the Annual Wellness Visit    [facility]    HEALTH RISK ASSESSMENT    1955    Recent Hospitalizations:  No hospitalization(s) within the last year..      Current Medical Providers:  Patient Care Team:  Jh Danielle APRN as PCP - General (Family Medicine)  Peng Figueroa MD as Consulting Physician (Otolaryngology)  Geo Sellers MD as Consulting Physician (Urology)  Rebecca Abebe MD as Consulting Physician (Radiation Oncology)      Smoking Status:  History   Smoking Status   • Never Smoker   Smokeless Tobacco   • Never Used       Alcohol Consumption:  History   Alcohol Use No       Depression Screen:   PHQ-2/PHQ-9 Depression Screening 9/5/2018   Little interest or pleasure in doing things 0   Feeling down, depressed, or hopeless 0   Total Score 0       Health Habits and Functional and Cognitive Screening:  Functional & Cognitive Status 9/5/2018   Do you have difficulty bathing yourself, getting dressed or grooming yourself? No   Do you have difficulty using the toilet? No   Do you have difficulty moving around from place to place? No   Do you have trouble with steps or getting out of a bed or a chair? No   In the past year have you fallen or experienced a near fall? No   Current Diet Well Balanced Diet   Dental Exam Up to date   Eye Exam Up to date   Exercise (times per week) 4 times per week   Current Exercise Activities Include Walking   Do you need help using the phone?  No   Are you deaf or do you have serious difficulty hearing?  No   Do you need help with transportation? No   Do you need help shopping? No   Do you need help preparing meals?  No   Do you need help with housework?  No   Do you need help with laundry? No   Do you need help taking your medications? No   Do you need help managing money? No   Do you ever drive or ride in a car without wearing a seat belt? No   Have you felt unusual stress, anger or loneliness  in the last month? No   Who do you live with? Spouse   If you need help, do you have trouble finding someone available to you? No   Have you been bothered in the last four weeks by sexual problems? No   Do you have difficulty concentrating, remembering or making decisions? No           Does the patient have evidence of cognitive impairment? No    Aspirin use counseling? Does not need ASA (and currently is not on it)      Recent Lab Results:  CMP:  Lab Results   Component Value Date    BUN 33 (H) 08/27/2018    CREATININE 1.28 08/27/2018    EGFRIFAFRI 68 08/27/2018    BCR 25.8 (H) 08/27/2018     08/27/2018    K 4.9 08/27/2018    CO2 25.0 08/27/2018    CALCIUM 9.1 08/27/2018    ALBUMIN 3.54 08/27/2018    BILITOT 0.4 08/27/2018    ALKPHOS 92 08/27/2018    AST 30 08/27/2018    ALT 19 08/27/2018     Lipid Panel:  Lab Results   Component Value Date    CHOL 133 08/29/2017    TRIG 63 08/29/2017    HDL 70 (H) 08/29/2017     HbA1c:       Visual Acuity:  No exam data present    Age-appropriate Screening Schedule:  Refer to the list below for future screening recommendations based on patient's age, sex and/or medical conditions. Orders for these recommended tests are listed in the plan section. The patient has been provided with a written plan.    Health Maintenance   Topic Date Due   • ZOSTER VACCINE (2 of 2) 02/26/2015   • PNEUMOCOCCAL VACCINE (65+ HIGH RISK) (1 of 2 - PCV13) 04/07/2018   • INFLUENZA VACCINE  08/01/2018   • TDAP/TD VACCINES (2 - Td) 10/01/2026   • COLONOSCOPY  12/01/2026        Subjective   History of Present Illness    Sadi Stone Jr. is a 63 y.o. male an established patient presenting for a Welcome to Medicare Visit.     Pt in for Wellness Exam. Has prostate CA with mets, Taking pain meds per, uses infrequent. Just finished chemo and Rad TX for CA. Pt reports not sleeping still. Has not used Ambien, Has tried OTC supplements-did not help.     The following portions of the patient's history were  reviewed and updated as appropriate: allergies, current medications, past family history, past medical history, past social history, past surgical history and problem list.    Outpatient Medications Prior to Visit   Medication Sig Dispense Refill   • ALPHAGAN P 0.1 % solution ophthalmic solution      • Multiple Vitamins-Minerals (MULTIVITAMIN ADULT PO) Take  by mouth.     • omeprazole (priLOSEC) 40 MG capsule Take 1 capsule by mouth Daily. 90 capsule 1   • tamsulosin (FLOMAX) 0.4 MG capsule 24 hr capsule Take 1 capsule by mouth Every Night. 30 capsule 11   • dutasteride (AVODART) 0.5 MG capsule Take 1 capsule by mouth Daily. 30 capsule 11   • HYDROcodone-acetaminophen (NORCO)  MG per tablet Take 1 tablet by mouth Every 6 (Six) Hours As Needed for Moderate Pain . 60 tablet 0   • loratadine (CLARITIN) 10 MG tablet Take 1 tablet by mouth Every Morning. 30 tablet 0   • metoclopramide (REGLAN) 10 MG tablet Take 0.5 tablets by mouth 4 (Four) Times a Day Before Meals & at Bedtime. 120 tablet 5   • ondansetron (ZOFRAN) 8 MG tablet Take 1 tablet by mouth 3 (Three) Times a Day As Needed for Nausea or Vomiting. 30 tablet 5   • aspirin 81 MG EC tablet Take 81 mg by mouth Daily.     • dexamethasone (DECADRON) 4 MG tablet Take 2 tablets oral twice a day for 3 consecutive days beginning the day before chemotherapy and continue for 6 doses. 12 tablet 5   • diphenhydrAMINE (BENADRYL) 25 mg capsule Take 1 capsule by mouth Every 6 (Six) Hours As Needed for Itching (qhs for itching). 30 capsule 0   • fluconazole (DIFLUCAN) 200 MG tablet Take 1 tablet by mouth Daily. Daily for 5 day for mouth soreness 5 tablet 1   • lidocaine viscous (XYLOCAINE) 2 % solution Take 10 mL by mouth As Needed for Mild Pain . Swish and swallow 100 mL 3   • temazepam (RESTORIL) 7.5 MG capsule 1-2 tablets PRN qhs. 60 capsule 5     No facility-administered medications prior to visit.        Patient Active Problem List   Diagnosis   • Itching due to drug    • Chronic neck pain   • Chronic bilateral low back pain without sciatica   • PUD (peptic ulcer disease)   • Elevated PSA measurement   • Elevated LFTs   • Left leg swelling   • Fatigue   • Malignant tumor of prostate (CMS/HCC)   • Hydronephrosis of left kidney   • Lymphadenitis   • Kidney disease       Advance Care Planning:  has an advance directive - a copy HAS NOT been provided. Have asked the patient to send this to us to add to record.    Identification of Risk Factors:  Risk factors include: increased fall risk.    Review of Systems   Constitutional: Positive for fatigue. Negative for appetite change, chills and fever.   HENT: Negative for congestion, ear pain, rhinorrhea, sinus pressure and sore throat.    Eyes: Negative for itching and visual disturbance (glasses).   Respiratory: Negative for cough, shortness of breath and wheezing.    Cardiovascular: Positive for leg swelling (left leg). Negative for chest pain and palpitations.   Gastrointestinal: Negative for abdominal pain, constipation, diarrhea, nausea and vomiting.   Endocrine: Positive for cold intolerance. Negative for heat intolerance.   Genitourinary: Positive for difficulty urinating (leakage at times). Negative for dysuria and hematuria.   Musculoskeletal: Positive for back pain. Negative for arthralgias, joint swelling and myalgias.   Skin: Negative for rash and wound.   Allergic/Immunologic: Negative for environmental allergies and food allergies.   Neurological: Negative for dizziness, numbness and headaches.   Hematological: Negative for adenopathy. Does not bruise/bleed easily.   Psychiatric/Behavioral: Positive for sleep disturbance. Negative for dysphoric mood. The patient is not nervous/anxious.        Compared to one year ago, the patient feels his physical health is worse.  Compared to one year ago, the patient feels his mental health is the same.    Objective    Physical Exam   Constitutional: He is oriented to person, place, and  time. He appears well-developed and well-nourished. No distress.   HENT:   Head: Normocephalic and atraumatic.   Right Ear: External ear normal.   Left Ear: External ear normal.   Nose: Nose normal.   Mouth/Throat: Oropharynx is clear and moist.   Eyes: Pupils are equal, round, and reactive to light. Conjunctivae and EOM are normal. Right eye exhibits no discharge. Left eye exhibits no discharge. No scleral icterus.   Neck: Normal range of motion. Neck supple. No JVD (no bruits) present. No tracheal deviation present. No thyromegaly present.   Cardiovascular: Normal rate, regular rhythm and intact distal pulses.  Exam reveals no gallop and no friction rub.    No murmur heard.  Pulmonary/Chest: Effort normal and breath sounds normal. No respiratory distress. He has no wheezes. He has no rales. He exhibits no tenderness. Right breast exhibits no inverted nipple, no mass, no nipple discharge, no skin change and no tenderness. Left breast exhibits no inverted nipple, no mass, no nipple discharge, no skin change and no tenderness. Breasts are symmetrical.   Abdominal: Soft. Normal appearance and bowel sounds are normal. He exhibits no distension and no mass. There is no hepatosplenomegaly. There is no tenderness. There is no rebound and no guarding. No hernia.   Genitourinary:   Genitourinary Comments: Pt seeing Urology for prostate CA.   Musculoskeletal: Normal range of motion. He exhibits no edema, tenderness or deformity.   Lymphadenopathy:     He has no cervical adenopathy.   Neurological: He is alert and oriented to person, place, and time. He has normal reflexes. He displays normal reflexes.   Skin: Skin is warm and dry. No rash noted. He is not diaphoretic. No erythema. No pallor.   Psychiatric: He has a normal mood and affect. His behavior is normal. Judgment and thought content normal.   Nursing note and vitals reviewed.      Vitals:    09/05/18 1303   BP: 108/68   Pulse: 96   SpO2: 99%   Weight: 70.6 kg (155 lb  "9.6 oz)   Height: 172.7 cm (68\")   PainSc:   8       Patient's Body mass index is 23.66 kg/m². BMI is within normal parameters. No follow-up required.      Procedure   Procedures       Assessment/Plan   Patient Self-Management and Personalized Health Advice  The patient has been provided with information about: fall prevention and preventive services including:   · Advance directive, Fall Risk assessment done, Fall Risk plan of care done, Pneumococcal vaccine .    Visit Diagnoses:    ICD-10-CM ICD-9-CM   1. Initial Medicare annual wellness visit Z00.00 V70.0   2. Acute cystitis without hematuria N30.00 595.0   3. Malignant tumor of prostate (CMS/[facility]) C61 185   4. Fatigue, unspecified type R53.83 780.79   5. Low vitamin D level E55.9 268.9   6. Benign prostatic hyperplasia with nocturia N40.1 600.01    R35.1 788.43   7. Insomnia, unspecified type G47.00 780.52   8. Need for pneumococcal vaccine Z23 V03.82   9. Therapeutic drug monitoring Z51.81 V58.83       Orders Placed This Encounter   Procedures   • Comprehensive Metabolic Panel     Standing Status:   Future     Standing Expiration Date:   9/5/2019   • Lipid Panel     Standing Status:   Future     Standing Expiration Date:   9/5/2019   • TSH     Standing Status:   Future     Standing Expiration Date:   9/5/2019   • Vitamin D 25 Hydroxy     Standing Status:   Future     Standing Expiration Date:   9/5/2019   • Vitamin B12     Standing Status:   Future     Standing Expiration Date:   9/5/2019   • Folate     Standing Status:   Future     Standing Expiration Date:   9/5/2019   • Urine Drug Screen - Urine, Clean Catch     Standing Status:   Future     Standing Expiration Date:   9/5/2019   • POCT urinalysis dipstick, automated       Outpatient Encounter Prescriptions as of 9/5/2018   Medication Sig Dispense Refill   • ALPHAGAN P 0.1 % solution ophthalmic solution      • Multiple Vitamins-Minerals (MULTIVITAMIN ADULT PO) Take  by mouth.     • omeprazole (priLOSEC) 40 MG " capsule Take 1 capsule by mouth Daily. 90 capsule 1   • tamsulosin (FLOMAX) 0.4 MG capsule 24 hr capsule Take 1 capsule by mouth Every Night. 90 capsule 3   • [DISCONTINUED] tamsulosin (FLOMAX) 0.4 MG capsule 24 hr capsule Take 1 capsule by mouth Every Night. 30 capsule 11   • ciprofloxacin (CIPRO) 500 MG tablet Take 1 tablet by mouth 2 (Two) Times a Day. 20 tablet 0   • dutasteride (AVODART) 0.5 MG capsule Take 1 capsule by mouth Daily. 30 capsule 11   • HYDROcodone-acetaminophen (NORCO)  MG per tablet Take 1 tablet by mouth Every 6 (Six) Hours As Needed for Moderate Pain . 60 tablet 0   • loratadine (CLARITIN) 10 MG tablet Take 1 tablet by mouth Every Morning. 30 tablet 0   • metoclopramide (REGLAN) 10 MG tablet Take 0.5 tablets by mouth 4 (Four) Times a Day Before Meals & at Bedtime. 120 tablet 5   • ondansetron (ZOFRAN) 8 MG tablet Take 1 tablet by mouth 3 (Three) Times a Day As Needed for Nausea or Vomiting. 30 tablet 5   • zolpidem (AMBIEN) 5 MG tablet Take 1 tablet by mouth At Night As Needed for Sleep. 30 tablet 0   • [DISCONTINUED] aspirin 81 MG EC tablet Take 81 mg by mouth Daily.     • [DISCONTINUED] dexamethasone (DECADRON) 4 MG tablet Take 2 tablets oral twice a day for 3 consecutive days beginning the day before chemotherapy and continue for 6 doses. 12 tablet 5   • [DISCONTINUED] diphenhydrAMINE (BENADRYL) 25 mg capsule Take 1 capsule by mouth Every 6 (Six) Hours As Needed for Itching (qhs for itching). 30 capsule 0   • [DISCONTINUED] fluconazole (DIFLUCAN) 200 MG tablet Take 1 tablet by mouth Daily. Daily for 5 day for mouth soreness 5 tablet 1   • [DISCONTINUED] lidocaine viscous (XYLOCAINE) 2 % solution Take 10 mL by mouth As Needed for Mild Pain . Swish and swallow 100 mL 3   • [DISCONTINUED] temazepam (RESTORIL) 7.5 MG capsule 1-2 tablets PRN qhs. 60 capsule 5     Facility-Administered Encounter Medications as of 9/5/2018   Medication Dose Route Frequency Provider Last Rate Last Dose   •  [COMPLETED] pneumococcal conj. 13-valent (PREVNAR-13) vaccine 0.5 mL  0.5 mL Intramuscular Once [id number]   0.5 mL at 09/05/18 1425     Ambien--As part of this patient's treatment plan I am prescribing controlled substances. The patient has been made aware of appropriate use of such medications, including potential risk of somnolence, limited ability to drive and /or work safely, and potential for dependence or overdose. It has also been made clear that these medications are for use by this patient only, without concomitant use of alcohol or other substances unless prescribed.    Patient has completed prescribing agreement detailing terms of continued prescribing of controlled substances, including monitoring ERNIE reports, urine drug screening, and pill counts if necessary. The patient is aware that inappropriate use will result in cessation of prescribing such medications.    ERNIE report has been reviewed and scanned into the patient's chart.      History and physical exam exhibit continued safe and appropriate use of controlled substances at this time. This patient appears to have a low risk of dependence at this time.     UTI--Patient to take medications as directed. Make sure to take all of them. Return if no improvement or worsens in 5-7 days. If concerned after hours, may go to [facility] or ER for evaluation/Treatment.    Reviewed use of high risk medication in the elderly: yes  Reviewed for potential of harmful drug interactions in the elderly: yes    Follow Up:  Return in about 1 month (around 10/5/2018), or if symptoms worsen or fail to improve, for Recheck.     An After Visit Summary and PPPS with all of these plans were given to the patient.

## 2018-09-07 ENCOUNTER — TELEPHONE (OUTPATIENT)
Dept: FAMILY MEDICINE CLINIC | Facility: CLINIC | Age: 63
End: 2018-09-07

## 2018-09-07 ENCOUNTER — TELEPHONE (OUTPATIENT)
Dept: ONCOLOGY | Facility: CLINIC | Age: 63
End: 2018-09-07

## 2018-09-07 NOTE — TELEPHONE ENCOUNTER
Patient's daughter called [facility] line informing nurse that patient had blood in his urine. She also stated that he was still complaining of pain in his back. I informed patient's daughter that the Cipro still needed time to work since it was ordered only 2 days ago and that blood is sometimes seen in urine when a patient has a UTI and they may also complain of back pain. I informed her that if patient started running a fever over the weekend to notify the MD on call or bring patient into the ER.

## 2018-09-12 ENCOUNTER — OFFICE VISIT (OUTPATIENT)
Dept: FAMILY MEDICINE CLINIC | Facility: CLINIC | Age: 63
End: 2018-09-12

## 2018-09-12 VITALS
HEART RATE: 104 BPM | BODY MASS INDEX: 23.98 KG/M2 | DIASTOLIC BLOOD PRESSURE: 84 MMHG | WEIGHT: 158.2 LBS | HEIGHT: 68 IN | OXYGEN SATURATION: 94 % | SYSTOLIC BLOOD PRESSURE: 132 MMHG

## 2018-09-12 DIAGNOSIS — C61 MALIGNANT TUMOR OF PROSTATE (HCC): ICD-10-CM

## 2018-09-12 DIAGNOSIS — N30.00 ACUTE CYSTITIS WITHOUT HEMATURIA: Primary | ICD-10-CM

## 2018-09-12 DIAGNOSIS — G89.29 CHRONIC BILATERAL LOW BACK PAIN WITHOUT SCIATICA: ICD-10-CM

## 2018-09-12 DIAGNOSIS — M54.50 CHRONIC BILATERAL LOW BACK PAIN WITHOUT SCIATICA: ICD-10-CM

## 2018-09-12 LAB
BILIRUB BLD-MCNC: NEGATIVE MG/DL
CLARITY, POC: ABNORMAL
COLOR UR: YELLOW
GLUCOSE UR STRIP-MCNC: NEGATIVE MG/DL
KETONES UR QL: NEGATIVE
LEUKOCYTE EST, POC: ABNORMAL
NITRITE UR-MCNC: NEGATIVE MG/ML
PH UR: 6 [PH] (ref 5–8)
PROT UR STRIP-MCNC: ABNORMAL MG/DL
RBC # UR STRIP: ABNORMAL /UL
SP GR UR: 1.02 (ref 1–1.03)
UROBILINOGEN UR QL: NORMAL

## 2018-09-12 PROCEDURE — G0008 ADMIN INFLUENZA VIRUS VAC: HCPCS | Performed by: NURSE PRACTITIONER

## 2018-09-12 PROCEDURE — 90662 IIV NO PRSV INCREASED AG IM: CPT | Performed by: NURSE PRACTITIONER

## 2018-09-12 PROCEDURE — 81003 URINALYSIS AUTO W/O SCOPE: CPT | Performed by: NURSE PRACTITIONER

## 2018-09-12 PROCEDURE — 99213 OFFICE O/P EST LOW 20 MIN: CPT | Performed by: NURSE PRACTITIONER

## 2018-09-12 RX ORDER — NITROFURANTOIN 25; 75 MG/1; MG/1
100 CAPSULE ORAL 2 TIMES DAILY
Qty: 10 CAPSULE | Refills: 0 | Status: SHIPPED | OUTPATIENT
Start: 2018-09-12 | End: 2018-09-27

## 2018-09-12 RX ORDER — HYDROCODONE BITARTRATE AND ACETAMINOPHEN 10; 325 MG/1; MG/1
1 TABLET ORAL EVERY 8 HOURS PRN
Qty: 60 TABLET | Refills: 0 | Status: SHIPPED | OUTPATIENT
Start: 2018-09-12 | End: 2018-01-01 | Stop reason: HOSPADM

## 2018-09-12 NOTE — PROGRESS NOTES
Chief Complaint   Patient presents with   • Urinary Tract Infection       Subjective   Sadi Stone Jr. is a 63 y.o. male    History of Present Illness  UTI- Pt with a HX of prostate CA in with further SX, hematuria-dark at times, yesterday was lighter color, dysuria-better but still there, has been on Cipro 500 mg BID, for 7 days, not helping, To see urologist, in the next 2-3 wks,     Pt needs flu shot.     Allergies   Allergen Reactions   • Bactrim [Sulfamethoxazole-Trimethoprim] Anaphylaxis     Past Medical History:   Diagnosis Date   • Arthritis    • Glaucoma    • Peptic ulceration    • Prostate cancer (CMS/[facility]) 02/2018   • Ureteral obstruction, left 02/2018    due to metastatic prostate cancer      Past Surgical History:   Procedure Laterality Date   • KIDNEY SURGERY Left     removed a spot    • KNEE SURGERY     • SHOULDER SURGERY       Social History     Social History   • Marital status:      Spouse name: N/A   • Number of children: N/A   • Years of education: N/A     Occupational History   • Not on file.     Social History Main Topics   • Smoking status: Never Smoker   • Smokeless tobacco: Never Used   • Alcohol use No   • Drug use: No   • Sexual activity: Not Currently     Other Topics Concern   • Not on file     Social History Narrative   • No narrative on file        Current Outpatient Prescriptions:   •  ALPHAGAN P 0.1 % solution ophthalmic solution, , Disp: , Rfl:   •  ciprofloxacin (CIPRO) 500 MG tablet, Take 1 tablet by mouth 2 (Two) Times a Day., Disp: 20 tablet, Rfl: 0  •  dutasteride (AVODART) 0.5 MG capsule, Take 1 capsule by mouth Daily., Disp: 30 capsule, Rfl: 11  •  HYDROcodone-acetaminophen (NORCO)  MG per tablet, Take 1 tablet by mouth Every 8 (Eight) Hours As Needed for Moderate Pain ., Disp: 60 tablet, Rfl: 0  •  loratadine (CLARITIN) 10 MG tablet, Take 1 tablet by mouth Every Morning., Disp: 30 tablet, Rfl: 0  •  metoclopramide (REGLAN) 10 MG tablet, Take 0.5 tablets by  mouth 4 (Four) Times a Day Before Meals & at Bedtime., Disp: 120 tablet, Rfl: 5  •  Multiple Vitamins-Minerals (MULTIVITAMIN ADULT PO), Take  by mouth., Disp: , Rfl:   •  omeprazole (priLOSEC) 40 MG capsule, Take 1 capsule by mouth Daily., Disp: 90 capsule, Rfl: 1  •  ondansetron (ZOFRAN) 8 MG tablet, Take 1 tablet by mouth 3 (Three) Times a Day As Needed for Nausea or Vomiting., Disp: 30 tablet, Rfl: 5  •  tamsulosin (FLOMAX) 0.4 MG capsule 24 hr capsule, Take 1 capsule by mouth Every Night., Disp: 90 capsule, Rfl: 3  •  zolpidem (AMBIEN) 5 MG tablet, Take 1 tablet by mouth At Night As Needed for Sleep., Disp: 30 tablet, Rfl: 0  •  nitrofurantoin, macrocrystal-monohydrate, (MACROBID) 100 MG capsule, Take 1 capsule by mouth 2 (Two) Times a Day., Disp: 10 capsule, Rfl: 0     Review of Systems   Constitutional: Negative for chills and fever.   Respiratory: Negative for cough, shortness of breath and wheezing.    Gastrointestinal: Negative for constipation, diarrhea, nausea and vomiting.   Genitourinary: Positive for difficulty urinating and dysuria.   Musculoskeletal: Positive for back pain.   Psychiatric/Behavioral: Positive for sleep disturbance.       Objective     Vitals:    09/12/18 1017   BP: 132/84   Pulse: 104   SpO2: 94%       Results for orders placed or performed in visit on 09/12/18   POCT urinalysis dipstick, automated   Result Value Ref Range    Color Yellow Yellow, Straw, Dark Yellow, Renae    Clarity, UA Cloudy (A) Clear    Specific Gravity  1.020 1.005 - 1.030    pH, Urine 6.0 5.0 - 8.0    Leukocytes Large (3+) (A) Negative    Nitrite, UA Negative Negative    Protein, POC 3+ (A) Negative mg/dL    Glucose, UA Negative Negative, 1000 mg/dL (3+) mg/dL    Ketones, UA Negative Negative    Urobilinogen, UA Normal Normal    Bilirubin Negative Negative    Blood, UA 3+ (A) Negative       Physical Exam   Constitutional: He is oriented to person, place, and time. He appears well-developed and well-nourished.    Cardiovascular: Normal rate, regular rhythm and normal heart sounds.    Pulmonary/Chest: Effort normal and breath sounds normal.   Abdominal: There is tenderness (suprapubic pain).   Musculoskeletal: He exhibits edema (left leg).   Neurological: He is alert and oriented to person, place, and time.   Skin: Skin is warm and dry.   Psychiatric: He has a normal mood and affect. His behavior is normal.   Vitals reviewed.      Assessment/Plan   Problem List Items Addressed This Visit        Nervous and Auditory    Chronic bilateral low back pain without sciatica    Relevant Medications    HYDROcodone-acetaminophen (NORCO)  MG per tablet       Genitourinary    Malignant tumor of prostate (CMS/HCC)    Relevant Medications    HYDROcodone-acetaminophen (NORCO)  MG per tablet      Other Visit Diagnoses     Acute cystitis without hematuria    -  Primary    Relevant Medications    nitrofurantoin, macrocrystal-monohydrate, (MACROBID) 100 MG capsule    Other Relevant Orders    POCT urinalysis dipstick, automated (Completed)      As part of this patient's treatment plan I am prescribing controlled substances. The patient has been made aware of appropriate use of such medications, including potential risk of somnolence, limited ability to drive and /or work safely, and potential for dependence or overdose. It has also been made clear that these medications are for use by this patient only, without concomitant use of alcohol or other substances unless prescribed.    Patient has completed prescribing agreement detailing terms of continued prescribing of controlled substances, including monitoring ERNIE reports, urine drug screening, and pill counts if necessary. The patient is aware that inappropriate use will result in cessation of prescribing such medications.    ERNIE report has been reviewed and scanned into the patient's chart.      History and physical exam exhibit continued safe and appropriate use of controlled  substances at this time. This patient appears to have a low risk of dependence at this time.     Will add Macrobid to Cipro, Pt to cont to see Urology. Use Florastor OTC since on 2nd course of antibiotics.     RV- As scheduled or PRN    Counseling provided on UTI.    Jh aDnielle, APRN   9/12/2018   11:42 AM

## 2018-09-12 NOTE — PATIENT INSTRUCTIONS
Chief Complaints and History of Present Illnesses   Patient presents with     Follow Up For     evaluation of eye alignment and possible surgery.      HPI    Symptoms:              Comments:  Follow up for evaluation of possible surgery to align eyes.  Says she notices diplopia when she is tired. Can feel left eye turning inward.  Can make her feel uncomfortable when people don't know which eye to look at.    Complains of headaches at temples and base of neck.    LINDEN Toribio 5/9/2018 2:37 PM                Urinary Tract Infection, Adult  A urinary tract infection (UTI) is an infection of any part of the urinary tract, which includes the kidneys, ureters, bladder, and urethra. These organs make, store, and get rid of urine in the body. UTI can be a bladder infection (cystitis) or kidney infection (pyelonephritis).  What are the causes?  This infection may be caused by fungi, viruses, or bacteria. Bacteria are the most common cause of UTIs. This condition can also be caused by repeated incomplete emptying of the bladder during urination.  What increases the risk?  This condition is more likely to develop if:  · You ignore your need to urinate or hold urine for long periods of time.  · You do not empty your bladder completely during urination.  · You wipe back to front after urinating or having a bowel movement, if you are female.  · You are uncircumcised, if you are male.  · You are constipated.  · You have a urinary catheter that stays in place (indwelling).  · You have a weak defense (immune) system.  · You have a medical condition that affects your bowels, kidneys, or bladder.  · You have diabetes.  · You take antibiotic medicines frequently or for long periods of time, and the antibiotics no longer work well against certain types of infections (antibiotic resistance).  · You take medicines that irritate your urinary tract.  · You are exposed to chemicals that irritate your urinary tract.  · You are female.    What are the signs or symptoms?  Symptoms of this condition include:  · Fever.  · Frequent urination or passing small amounts of urine frequently.  · Needing to urinate urgently.  · Pain or burning with urination.  · Urine that smells bad or unusual.  · Cloudy urine.  · Pain in the lower abdomen or back.  · Trouble urinating.  · Blood in the urine.  · Vomiting or being less hungry than normal.  · Diarrhea or abdominal pain.  · Vaginal discharge, if you are female.    How is this diagnosed?  This condition is  diagnosed with a medical history and physical exam. You will also need to provide a urine sample to test your urine. Other tests may be done, including:  · Blood tests.  · Sexually transmitted disease (STD) testing.    If you have had more than one UTI, a cystoscopy or imaging studies may be done to determine the cause of the infections.  How is this treated?  Treatment for this condition often includes a combination of two or more of the following:  · Antibiotic medicine.  · Other medicines to treat less common causes of UTI.  · Over-the-counter medicines to treat pain.  · Drinking enough water to stay hydrated.    Follow these instructions at home:  · Take over-the-counter and prescription medicines only as told by your health care provider.  · If you were prescribed an antibiotic, take it as told by your health care provider. Do not stop taking the antibiotic even if you start to feel better.  · Avoid alcohol, caffeine, tea, and carbonated beverages. They can irritate your bladder.  · Drink enough fluid to keep your urine clear or pale yellow.  · Keep all follow-up visits as told by your health care provider. This is important.  · Make sure to:  ? Empty your bladder often and completely. Do not hold urine for long periods of time.  ? Empty your bladder before and after sex.  ? Wipe from front to back after a bowel movement if you are female. Use each tissue one time when you wipe.  Contact a health care provider if:  · You have back pain.  · You have a fever.  · You feel nauseous or vomit.  · Your symptoms do not get better after 3 days.  · Your symptoms go away and then return.  Get help right away if:  · You have severe back pain or lower abdominal pain.  · You are vomiting and cannot keep down any medicines or water.  This information is not intended to replace advice given to you by your health care provider. Make sure you discuss any questions you have with your health care provider.  Document Released:  09/27/2006 Document Revised: 05/31/2017 Document Reviewed: 11/07/2016  Elsevier Interactive Patient Education © 2018 [employer]

## 2018-09-14 ENCOUNTER — TELEPHONE (OUTPATIENT)
Dept: FAMILY MEDICINE CLINIC | Facility: CLINIC | Age: 63
End: 2018-09-14

## 2018-09-14 DIAGNOSIS — Z00.00 INITIAL MEDICARE ANNUAL WELLNESS VISIT: ICD-10-CM

## 2018-09-14 DIAGNOSIS — Z51.81 THERAPEUTIC DRUG MONITORING: ICD-10-CM

## 2018-09-14 DIAGNOSIS — C77.5 PROSTATE CANCER METASTATIC TO INTRAPELVIC LYMPH NODE (HCC): ICD-10-CM

## 2018-09-14 DIAGNOSIS — C64.2 MALIGNANT NEOPLASM OF LEFT KIDNEY (HCC): ICD-10-CM

## 2018-09-14 DIAGNOSIS — R79.89 LOW VITAMIN D LEVEL: ICD-10-CM

## 2018-09-14 DIAGNOSIS — C61 PROSTATE CANCER METASTATIC TO INTRAPELVIC LYMPH NODE (HCC): ICD-10-CM

## 2018-09-14 DIAGNOSIS — R53.83 FATIGUE, UNSPECIFIED TYPE: ICD-10-CM

## 2018-09-14 NOTE — TELEPHONE ENCOUNTER
MEDICATION THAT WAS PRESCRIBED IS EXPENSIVE, IS THEIR ANY OTHER MED THAT IS LESS EXPENSIVE    FLORASTOR IS THE MEDICATION, EVEN WITH COUPON THEY DO NOT HAVE MONEY TO OBTAIN    [phone]

## 2018-09-25 ENCOUNTER — TELEPHONE (OUTPATIENT)
Dept: FAMILY MEDICINE CLINIC | Facility: CLINIC | Age: 63
End: 2018-09-25

## 2018-09-25 NOTE — TELEPHONE ENCOUNTER
MED QUESTION:  DOES THE PATIENT NEED TO CONTINUE TAKING THE FLORASTOR NOW THAT  HE'S FINISHED WITH HIS ANTIBIOTIC?

## 2018-09-27 ENCOUNTER — OFFICE VISIT (OUTPATIENT)
Dept: FAMILY MEDICINE CLINIC | Facility: CLINIC | Age: 63
End: 2018-09-27

## 2018-09-27 VITALS
HEIGHT: 68 IN | WEIGHT: 154 LBS | DIASTOLIC BLOOD PRESSURE: 72 MMHG | OXYGEN SATURATION: 98 % | HEART RATE: 76 BPM | SYSTOLIC BLOOD PRESSURE: 126 MMHG | BODY MASS INDEX: 23.34 KG/M2

## 2018-09-27 DIAGNOSIS — N39.0 RECURRENT UTI: Primary | ICD-10-CM

## 2018-09-27 DIAGNOSIS — R53.0 NEOPLASTIC MALIGNANT RELATED FATIGUE: ICD-10-CM

## 2018-09-27 DIAGNOSIS — R10.10 PAIN OF UPPER ABDOMEN: ICD-10-CM

## 2018-09-27 LAB
BILIRUB BLD-MCNC: NEGATIVE MG/DL
CLARITY, POC: ABNORMAL
COLOR UR: YELLOW
GLUCOSE UR STRIP-MCNC: NEGATIVE MG/DL
KETONES UR QL: NEGATIVE
LEUKOCYTE EST, POC: ABNORMAL
NITRITE UR-MCNC: NEGATIVE MG/ML
PH UR: 6 [PH] (ref 5–8)
PROT UR STRIP-MCNC: ABNORMAL MG/DL
RBC # UR STRIP: ABNORMAL /UL
SP GR UR: 1.01 (ref 1–1.03)
UROBILINOGEN UR QL: NORMAL

## 2018-09-27 PROCEDURE — 99214 OFFICE O/P EST MOD 30 MIN: CPT | Performed by: NURSE PRACTITIONER

## 2018-09-27 PROCEDURE — 81003 URINALYSIS AUTO W/O SCOPE: CPT | Performed by: NURSE PRACTITIONER

## 2018-09-27 RX ORDER — LEVOFLOXACIN 500 MG/1
500 TABLET, FILM COATED ORAL DAILY
Qty: 2 TABLET | Refills: 0 | Status: SHIPPED | OUTPATIENT
Start: 2018-09-27 | End: 2018-01-01

## 2018-09-27 NOTE — PROGRESS NOTES
Chief Complaint   Patient presents with   • Urinary Tract Infection     wants to make sure it has cleared up      Sadi presents today with symptoms of UTI and abdominal pain.  He is very pleasant and having a good day.  Urinary Tract Infection    This is a recurrent problem. The current episode started 1 to 4 weeks ago. The problem occurs intermittently. The problem has been resolved (it did resolve he thinks, but then came back). The quality of the pain is described as burning (burning at the end of urination). The pain is at a severity of 1/10. The pain is mild. There has been no fever. Associated symptoms include flank pain, frequency, hematuria and urgency (mild). Pertinent negatives include no nausea or vomiting. He has tried antibiotics (takes norco for back pain at night time; has been on Cipro and then Macrobid for recurrent UTI.) for the symptoms. Improvement on treatment: Did not improve with Cipro, so he was prescribed Macrobid. Symtoms went away but then he began urinating a dark color with burning pain at end of stream yesterday. His past medical history is significant for recurrent UTIs. Currently Sadi is undergoing treatment (Chemo and radiation) for Prostate CA   Pain   This is a recurrent (midabdominal, 2/10, aching pain) problem. The current episode started 1 to 4 weeks ago. The problem occurs intermittently (lasts about 30 minutes at a time after eating.). The problem has been unchanged. Associated symptoms include abdominal pain, anorexia (intermittent anorexia, not daily; occurs usually after chemo) and fatigue. Pertinent negatives include no arthralgias, chest pain, diaphoresis, fever, joint swelling, myalgias, nausea, numbness, rash, vomiting or weakness. The symptoms are aggravated by eating. He has tried rest (Prilosec once in the mornings, reglan 4 times per day) for the symptoms. The treatment provided no relief.   Leg Swelling   This is a recurrent problem. The current episode started  1 to 4 weeks ago. The problem occurs daily. The problem has been unchanged. Associated symptoms include abdominal pain, anorexia (intermittent anorexia, not daily; occurs usually after chemo) and fatigue. Pertinent negatives include no arthralgias, chest pain, diaphoresis, fever, joint swelling, myalgias, nausea, numbness, rash, vomiting or weakness. Associated symptoms comments: Leg leg edema . The symptoms are aggravated by walking (being on feet for too long). He has tried rest (elevating left leg) for the symptoms. The treatment provided mild relief.    He denies pain or skin color or temperature changes.  Fatigue  Sadi said he is having increased fatigue after his last chemotherapy treatment.  He is inquiring about the use of energy drinks.  He says he tries to drink a boost daily but it does not affect his energy level.    Review of Systems   Constitutional: Positive for appetite change (decreased appetite usually after chemo treatments, but he is making a conscious effort to eat even if he does not want to.) and fatigue. Negative for activity change, diaphoresis and fever.   Respiratory: Negative for chest tightness, shortness of breath and wheezing.    Cardiovascular: Positive for leg swelling (left leg). Negative for chest pain and palpitations.   Gastrointestinal: Positive for abdominal pain, anorexia (intermittent anorexia, not daily; occurs usually after chemo), constipation (relieved by stool softeners and Miralax) and indigestion (occasionally after eating). Negative for abdominal distention, anal bleeding, blood in stool, diarrhea, nausea, vomiting and GERD.   Genitourinary: Positive for dysuria (burning at end of stream; 1/10 pain), flank pain, frequency, hematuria and urgency (mild). Negative for urinary incontinence, decreased urine volume, difficulty urinating, discharge, penile pain, penile swelling, scrotal swelling and testicular pain.   Musculoskeletal: Positive for back pain (chronic  issue). Negative for arthralgias, joint swelling and myalgias.   Skin: Negative for color change and rash.   Allergic/Immunologic: Positive for immunocompromised state (chemo/radiation - currently).   Neurological: Negative for dizziness, tremors, speech difficulty, weakness, light-headedness, numbness, headache and confusion.   Psychiatric/Behavioral: Negative for suicidal ideas, depressed mood and stress. The patient is not nervous/anxious.           Current Outpatient Prescriptions:   •  ALPHAGAN P 0.1 % solution ophthalmic solution, , Disp: , Rfl:   •  dutasteride (AVODART) 0.5 MG capsule, Take 1 capsule by mouth Daily., Disp: 30 capsule, Rfl: 11  •  HYDROcodone-acetaminophen (NORCO)  MG per tablet, Take 1 tablet by mouth Every 8 (Eight) Hours As Needed for Moderate Pain ., Disp: 60 tablet, Rfl: 0  •  loratadine (CLARITIN) 10 MG tablet, Take 1 tablet by mouth Every Morning., Disp: 30 tablet, Rfl: 0  •  metoclopramide (REGLAN) 10 MG tablet, Take 0.5 tablets by mouth 4 (Four) Times a Day Before Meals & at Bedtime., Disp: 120 tablet, Rfl: 5  •  Multiple Vitamins-Minerals (MULTIVITAMIN ADULT PO), Take  by mouth., Disp: , Rfl:   •  omeprazole (priLOSEC) 40 MG capsule, Take 1 capsule by mouth Daily., Disp: 90 capsule, Rfl: 1  •  ondansetron (ZOFRAN) 8 MG tablet, Take 1 tablet by mouth 3 (Three) Times a Day As Needed for Nausea or Vomiting., Disp: 30 tablet, Rfl: 5  •  tamsulosin (FLOMAX) 0.4 MG capsule 24 hr capsule, Take 1 capsule by mouth Every Night., Disp: 90 capsule, Rfl: 3  •  zolpidem (AMBIEN) 5 MG tablet, Take 1 tablet by mouth At Night As Needed for Sleep., Disp: 30 tablet, Rfl: 0  •  levoFLOXacin (LEVAQUIN) 500 MG tablet, Take 1 tablet by mouth Daily for 2 days., Disp: 2 tablet, Rfl: 0    Current Facility-Administered Medications:   •  pneumococcal conj. 13-valent (PREVNAR-13) vaccine 0.5 mL, 0.5 mL, Intramuscular, Once, Jh Danielle APRN     Allergies   Allergen Reactions   • Bactrim  "[Sulfamethoxazole-Trimethoprim] Anaphylaxis       History   Smoking Status   • Never Smoker   Smokeless Tobacco   • Never Used        Visit Vitals  /72 (BP Location: Left arm, Patient Position: Sitting, Cuff Size: Adult)   Pulse 76   Ht 172.7 cm (68\")   Wt 69.9 kg (154 lb)   SpO2 98%   BMI 23.42 kg/m²        Physical Exam   Constitutional: He is oriented to person, place, and time. He appears well-developed and well-nourished. No distress.   HENT:   Head: Normocephalic.   Cardiovascular: Normal rate, regular rhythm, normal heart sounds and intact distal pulses.  Exam reveals no gallop and no friction rub.    No murmur heard.  Pulses:       Radial pulses are 2+ on the right side, and 2+ on the left side.        Dorsalis pedis pulses are 2+ on the right side, and 2+ on the left side.        Posterior tibial pulses are 2+ on the right side, and 2+ on the left side.   Pulmonary/Chest: Effort normal and breath sounds normal. No respiratory distress. He has no wheezes.   Abdominal: Soft. Bowel sounds are normal. He exhibits no distension, no pulsatile liver, no abdominal bruit, no ascites, no pulsatile midline mass and no mass. There is no hepatosplenomegaly. There is tenderness (mid abdomen- states it was tender upon palpation). There is CVA tenderness (Bilateral). There is no rigidity, no rebound, no guarding, no tenderness at McBurney's point and negative Hargrove's sign. No hernia.       Musculoskeletal: Normal range of motion.        Left lower leg: He exhibits edema (1+ pitting).   Neurological: He is alert and oriented to person, place, and time. He has normal strength. He displays normal reflexes. Coordination normal.   Skin: Skin is warm, dry and intact. No rash noted.   Psychiatric: He has a normal mood and affect. His speech is normal and behavior is normal. Judgment and thought content normal.   Nursing note and vitals reviewed.      Results for orders placed or performed in visit on 09/27/18   POCT " urinalysis dipstick, automated   Result Value Ref Range    Color Yellow Yellow, Straw, Dark Yellow, Renae    Clarity, UA Cloudy (A) Clear    Specific Gravity  1.015 1.005 - 1.030    pH, Urine 6.0 5.0 - 8.0    Leukocytes Moderate (2+) (A) Negative    Nitrite, UA Negative Negative    Protein, POC 1+ (A) Negative mg/dL    Glucose, UA Negative Negative, 1000 mg/dL (3+) mg/dL    Ketones, UA Negative Negative    Urobilinogen, UA Normal Normal    Bilirubin Negative Negative    Blood, UA 1+ (A) Negative        Sadi was seen today for urinary tract infection.    Diagnoses and all orders for this visit:    Recurrent UTI  -     levoFLOXacin (LEVAQUIN) 500 MG tablet; Take 1 tablet by mouth Daily for 2 days.  -     Urine Culture - Urine, Urine, Clean Catch; Future  -     POCT urinalysis dipstick, automated  - Will send urine for culture and sensitivity since he seems resistant to Cipro and Macrobid; will notify patient of results.  Instructed patient to begin taking Levaquin this evening with food, and tomorrow evening with food; this would complete his treatment with Levaquin.  - Instructed him to continue use of probiotics.    Abdominal Pain  - I encouraged Sadi to continue the use of his Prilosec.  Instructed him he could take Tums or Pepto-Bismol OTC if needed.  Instructed him to call the clinic or seek emergent treatment if he had blood in stool or increase in symptoms.  I encouraged him to speak with his oncologist about this issue.    Leg swelling/Edema  -Instructed Sadi to elevate legs as much as possible, consume protein, and try compression stockings to help decrease swelling.  I encouraged him to speak with his oncologist about this issue.    Fatigue  -Instructed Sadi to rest when needed.  Educated him that chemotherapy and radiation effect energy levels.  I discouraged the use of energy drinks.  I encouraged him to try Gatorade or Pedialyte and lots of water.  I educated him that boosts would not affect his  energy level it was more for nutrition and protein consumption.  Encouraged him to speak with oncologist about fatigue symptom management.          Return if symptoms worsen or fail to improve.    Sadi has scheduled oncology appointment next week.  I instructed him to ask his oncologist about his energy level, left leg edema, and abdominal symptoms.  I also instructed him to ask if they would repeat a urinalysis to ensure the Levaquin treated his UTI.

## 2018-10-01 NOTE — PROGRESS NOTES
"     Malignant tumor of prostate (CMS/HCC)    12/13/2017 Initial Diagnosis     Prostate cancer metastatic to intrapelvic lymph node        - 1/30/2018 Radiation     Radiation OncologyTreatment Course:  Sadi Stone JrTara received 45 cGy in 18 fractions with Dr. Rosas at [facility]         1/30/2018 -  Hormonal Therapy     Received Eliguard every 6 months            REASON FOR VISIT: Metastatic Prostate Cancer    HISTORY OF PRESENT ILLNESS:   63 y.o.  male presents today for Follow-up of his locally advanced prostate cancer.  He completed 6 cycles of Taxotere.  He is doing well with minimal back pain..  Lower extremity edema still present due to lymphedema.  Back pain much better.  He was recently diagnosed with UTI.  Otherwise no significant pain.  He has no bone metastases.    Past medical history, social history and family history was reviewed and unchanged from prior visit.    Review of Systems:    Review of Systems   Constitutional: Positive for fatigue.   HENT:  Negative.    Eyes: Negative.    Respiratory: Negative.    Cardiovascular: Positive for leg swelling.   Gastrointestinal: Positive for constipation.   Endocrine: Negative.    Genitourinary: Negative.     Musculoskeletal: Negative.    Skin: Negative.    Neurological: Negative.    Hematological: Negative.    Psychiatric/Behavioral: Negative.       A comprehensive 14 point review of systems was performed and was negative except as mentioned.      Medications:  The current medication list was reviewed in the EMR    ALLERGIES:    Allergies   Allergen Reactions   • Bactrim [Sulfamethoxazole-Trimethoprim] Anaphylaxis         Physical Exam    VITAL SIGNS:  /62 Comment: LUE  Pulse 80   Temp 97 °F (36.1 °C) (Temporal Artery )   Resp 16   Ht 172.7 cm (68\")   Wt 70.3 kg (155 lb)   SpO2 98% Comment: RA  BMI 23.57 kg/m²     Wt Readings from Last 3 Encounters:   10/01/18 70.3 kg (155 lb)   09/27/18 69.9 kg (154 lb)   09/12/18 71.8 kg (158 lb " 3.2 oz)        Performance Status: 0    General: well appearing, in no acute distress  HEENT: sclera anicteric, oropharynx clear, neck is supple  Lymphatics: no cervical, supraclavicular, or axillary adenopathy  Cardiovascular: regular rate and rhythm, no murmurs, rubs or gallops  Lungs: clear to auscultation bilaterally  Abdomen: soft, nontender, nondistended.  No palpable organomegaly  Extremities: + Left  lower extremity edema  Skin: no rashes, lesions, bruising, or petechiae  Msk:  Shows no weakness of the large muscle groups  Psych: Mood is stable    Lab Results   Component Value Date    HGB 8.7 (L) 08/27/2018    HCT 28.5 (L) 08/27/2018    MCV 88.4 08/27/2018     08/27/2018    WBC 11.20 (H) 08/27/2018    NEUTROABS 9.60 (H) 08/27/2018    LYMPHSABS 1.30 08/27/2018    MONOSABS 0.30 08/27/2018     Lab Results   Component Value Date    GLUCOSE 147 (H) 08/27/2018    BUN 33 (H) 08/27/2018    CREATININE 1.28 08/27/2018     08/27/2018    K 4.9 08/27/2018     08/27/2018    CO2 25.0 08/27/2018    CALCIUM 9.1 08/27/2018    PROTEINTOT 6.2 08/27/2018    ALBUMIN 3.54 08/27/2018    BILITOT 0.4 08/27/2018    ALKPHOS 92 08/27/2018    AST 30 08/27/2018    ALT 19 08/27/2018       Lab Results   Component Value Date    PSA 0.370 08/27/2018    PSA 0.310 08/06/2018    PSA 0.300 07/16/2018    PSA 0.410 06/20/2018    PSA 0.700 05/30/2018             Assessment/Plan    1.  Locally advanced metastatic prostate cancer: Continue Eligard .  Completed  cycle #6 of Taxotere over a month ago.  I'm going to start him on Zytiga and prednisone.  He is at fairly high risk.  I suspect his left lower extremity edema has not can improve considering he likely has sclerosis of his lymph nodes.  I may consider sending him to lymphedema clinic to treat.  I'm going to check a baseline CAT scan of the chest abdomen pelvis and a bone scan prior to starting Zytiga.  I will also have him discuss the side effects with our oral chemotherapy  pharmacist.    2.  Pelvic pain: Unclear if this is related to his disease or the stents. Its better.  He is scheduled to have stent changed out in the next couple weeks.    3.  Insomnia: better on restoril    4.  Left lower extremity edema secondary to lymphadenopathy - may need Lymphedema clinic if there is no improvement soon.    I spent 40  minutes on the patient's plan and care with over 50% spent counseling the patient      Jolynn Dao MD  [facility] Hematology and Oncology    10/1/2018     Return on: 12/17/18  Return in (Approximately): 2 months    Orders Placed This Encounter   Procedures   • CT Chest With Contrast     Standing Status:   Future     Standing Expiration Date:   10/1/2019   • CT Abdomen Pelvis With Contrast     Standing Status:   Future     Standing Expiration Date:   10/1/2019     Order Specific Question:   Will Oral Contrast be needed for this procedure?     Answer:   Yes   • NM Bone Scan Whole Body     Standing Status:   Future     Standing Expiration Date:   10/1/2019   • Comprehensive metabolic panel     Standing Status:   Future     Standing Expiration Date:   10/8/2019   • Comprehensive metabolic panel     Standing Status:   Future     Standing Expiration Date:   10/22/2019   • Comprehensive metabolic panel     Standing Status:   Future     Standing Expiration Date:   11/5/2019   • Comprehensive metabolic panel     Standing Status:   Future     Standing Expiration Date:   11/19/2019   • Comprehensive metabolic panel     Standing Status:   Future     Standing Expiration Date:   12/3/2019   • Comprehensive metabolic panel     Standing Status:   Future     Standing Expiration Date:   12/17/2019   • PSA Diagnostic     Standing Status:   Future     Standing Expiration Date:   10/8/2019   • PSA Diagnostic     Standing Status:   Future     Standing Expiration Date:   12/3/2019   • Urinalysis With Culture If Indicated - Urine, Clean Catch     Standing Status:   Future     Number of  Occurrences:   1     Standing Expiration Date:   10/1/2019   • CBC and Differential     Standing Status:   Future     Standing Expiration Date:   10/8/2019     Order Specific Question:   Manual Differential     Answer:   No   • CBC and Differential     Standing Status:   Future     Standing Expiration Date:   10/22/2019     Order Specific Question:   Manual Differential     Answer:   No   • CBC and Differential     Standing Status:   Future     Standing Expiration Date:   11/5/2019     Order Specific Question:   Manual Differential     Answer:   No   • CBC and Differential     Standing Status:   Future     Standing Expiration Date:   11/19/2019     Order Specific Question:   Manual Differential     Answer:   No   • CBC and Differential     Standing Status:   Future     Standing Expiration Date:   12/3/2019     Order Specific Question:   Manual Differential     Answer:   No   • CBC and Differential     Standing Status:   Future     Standing Expiration Date:   12/17/2019     Order Specific Question:   Manual Differential     Answer:   No         Please note that portions of this note may have been completed with a voice recognition program. Efforts were made to edit the dictations, but occasionally words are mistranscribed.

## 2018-10-01 NOTE — PROGRESS NOTES
Oral Chemotherapy Teaching      Patient Name/:  Sadi Stone   1955  Oral Chemotherapy Regimen:  Zytiga 1000mg PO daily + Prednisone 5mg PO BID  Date Started Medication: pending medication acquisition      Additional Notes: received notification from clinic, plan to start patient on Zytiga + Prednisone. Submitted script to [facility] retail to begin processing. Pt will need scheduled in oral chemo education clinic for education and exploration of financial navigation services available.

## 2018-10-02 NOTE — PROGRESS NOTES
Oral Chemotherapy Teaching      Patient Name/:  Sadi Stone   1955  Oral Chemotherapy Regimen:  Zytiga 1000mg PO daily + Prednisone 5mg PO BID  Date Started Medication: plan to start treatment on 10/3/18    Initial Teaching Follow Up Comments     Safety     Storage instructions (away from children; away from heat/cold, sunlight, or moisture), handling - use of gloves (caregivers), washing hands after touching pills, managing waste     “How are you storing your medications?”, reminders on storage, proper handling (caregivers using gloves, washing hands, away from children, managing waste, etc.), disposal of medication with D/C or dosage change    Patient counseled on hazardous handling and storage precautions. Discussed plan to wash hands after handling. Caregivers to handle with latex gloves. Store at room temp, away from pets and children. Pt verbalized understanding.      Adherence      patient and/or caregiver on how to take medication, take with/without food, assess their adherence potential, stress importance of adherence, ways to manage adherence (pill boxes, phone reminders, calendars), what to do if miss a dose   “How are you taking your medication?” “How are you remembering to take your medication?”, “How many doses have you missed?”, determine reasons for non-adherence (not remembering, side effects, etc), ways to improve, overadherence? Remind patient of ways to improve/maintain adherence   Pt seen in clinic this morning with grand-daughter. Discussed plan to take Zytiga 1000mg (2 tablets) by mouth once a day on an empty stomach ( no food 2 hours before or 1 hour after). Medication ready to dispense at [employer] retail. Will plan to start treatment on Weds 10/3/18. Reviewed use of Prednisone 5mg (one tablet) by mouth twice a day. Pt verbalized understanding. Provided written information. Plans to  from pharmacy this morning.      Side Effects/Adverse Reactions      patient on  potential side effects, s/s, ways to manage, when to call MD/seek help     Determine if patient experiencing side effects, ways to manage  Discussed side effects including but not limited to: N/V, fatigue, insomnia, edema, hot flashes, increased LFTs, and weight gain.      Miscellaneous     Food interactions, DDIs, financial issues Determine if patient started any new medications since being placed on oral chemo (analyze for DDI) No DDIs identified with active medication list. Medication ready to dispense from [facility] retail. Pt picking up today.      Additional Notes:  Discussed aforementioned material with patient in clinic. All questions and concerns addressed. Provided patient with my contact information and instructions to call should additional questions arise. Obtained signed CCA.

## 2018-10-23 NOTE — PROGRESS NOTES
Oral Chemotherapy Teaching      Patient Name/:  Sadi Stone   1955  Oral Chemotherapy Regimen:  Zytiga 1000mg PO daily + Prednisone 5mg PO BID  Date Started Medication:  10/3/18    Initial Teaching Follow Up Comments     Safety     Storage instructions (away from children; away from heat/cold, sunlight, or moisture), handling - use of gloves (caregivers), washing hands after touching pills, managing waste     “How are you storing your medications?”, reminders on storage, proper handling (caregivers using gloves, washing hands, away from children, managing waste, etc.), disposal of medication with D/C or dosage change    Pt confirms appropriate storage and handling.      Adherence      patient and/or caregiver on how to take medication, take with/without food, assess their adherence potential, stress importance of adherence, ways to manage adherence (pill boxes, phone reminders, calendars), what to do if miss a dose   “How are you taking your medication?” “How are you remembering to take your medication?”, “How many doses have you missed?”, determine reasons for non-adherence (not remembering, side effects, etc), ways to improve, overadherence? Remind patient of ways to improve/maintain adherence   Pt confirmed administration of Zytiga 1000mg (2 tablets) by mouth once a day on an empty stomach ( no food 2 hours before or 1 hour after). And  use of Prednisone 5mg (one tablet) by mouth twice a day. Requesting refill of Zytiga and Prednisone. Request script to be mailed. Will pass along to [facility] retail of request. Verified patient address.       Side Effects/Adverse Reactions      patient on potential side effects, s/s, ways to manage, when to call MD/seek help     Determine if patient experiencing side effects, ways to manage  Pt reports tolerating first cycle. Describes some dry mouth and dizziness on occasion. Reviewed may use articifical saliva or hard candy to assist with dry mouth. Also  discussed dizziness may be related to orthostatic hypotension seen with tamsulosin at times. Pt plans to check his BP during his next dizzy spell. Reviewed taking his time when transitioning from sitting to standing or lying to sitting.      Miscellaneous     Food interactions, DDIs, financial issues Determine if patient started any new medications since being placed on oral chemo (analyze for DDI) Alerted [facility] of refill request.       Additional Notes:  Discussed aforementioned material with patient over the phone. All questions and concerns addressed. Provided patient with my contact information and instructions to call should additional questions arise.

## 2018-11-09 NOTE — PROGRESS NOTES
11/9/2018  ~lei Calixto  [facility] ARASH FORMS TO BE FILLED OUT FOR PATIENT'S SPOUSE AND TO BE PICKED UP BY PATIENT.     FORM FEE TO BE PAID AT      PAPERWORK PLACED IN MA BOX    Rec'd.    11/15/2018  Completed & put in MD box.    11/16/2018  Rec'd with MD signature.  Put @  to be picked up and pay $15 fee.  Called and notified pt.  He verbalized understanding.

## 2018-11-26 NOTE — PROGRESS NOTES
Oral Chemotherapy Teaching      Patient Name/:  Sadi Stone   1955  Oral Chemotherapy Regimen:  Zytiga 1000mg PO daily + Prednisone 5mg PO BID  Date Started Medication:  10/3/18    Initial Teaching Follow Up Comments     Safety     Storage instructions (away from children; away from heat/cold, sunlight, or moisture), handling - use of gloves (caregivers), washing hands after touching pills, managing waste     “How are you storing your medications?”, reminders on storage, proper handling (caregivers using gloves, washing hands, away from children, managing waste, etc.), disposal of medication with D/C or dosage change    Pt confirms appropriate storage and handling.      Adherence      patient and/or caregiver on how to take medication, take with/without food, assess their adherence potential, stress importance of adherence, ways to manage adherence (pill boxes, phone reminders, calendars), what to do if miss a dose   “How are you taking your medication?” “How are you remembering to take your medication?”, “How many doses have you missed?”, determine reasons for non-adherence (not remembering, side effects, etc), ways to improve, overadherence? Remind patient of ways to improve/maintain adherence   Pt confirmed administration of Zytiga 1000mg (2 tablets) by mouth once a day on an empty stomach ( no food 2 hours before or 1 hour after). And  use of Prednisone 5mg (one tablet) by mouth twice a day. Wife reports that patient has pills remaining at this time, but will have patient count remaining pills and will call into office if refill needed. WIll likely call at the end of the week and  in person per wife.      Side Effects/Adverse Reactions      patient on potential side effects, s/s, ways to manage, when to call MD/seek help     Determine if patient experiencing side effects, ways to manage  Spouse reports patient tolerating therapy. Reports some swelling in legs, however they have  attributed this to stent that patient had previously in lower extremity     Miscellaneous     Food interactions, DDIs, financial issues Determine if patient started any new medications since being placed on oral chemo (analyze for DDI) Pt plans to call office when refill needed. Reports plan to  in person at end of the week.        Additional Notes:  Discussed aforementioned material with patient's spouse  over the phone. All questions and concerns addressed. Provided patient with my contact information and instructions to call should additional questions arise.

## 2018-12-17 NOTE — PROGRESS NOTES
"     Malignant tumor of prostate (CMS/HCC)    12/13/2017 Initial Diagnosis     Prostate cancer metastatic to intrapelvic lymph node        - 1/30/2018 Radiation     Radiation OncologyTreatment Course:  Sadi Stone JrTara received 45 cGy in 18 fractions with Dr. Rosas at [facility]         1/30/2018 -  Hormonal Therapy     Received Eliguard every 6 months         5/7/2018 - 8/27/2018 Chemotherapy     Taxotere X 6 cycles         10/15/2018 -  Chemotherapy     OP PROSTATE Abiraterone / PredniSONE              REASON FOR VISIT: Metastatic Prostate Cancer    HISTORY OF PRESENT ILLNESS:   63 y.o.  male presents today for Follow-up of his locally advanced prostate cancer.  He completed 6 cycles of Taxotere.  He is doing well with minimal back pain..  Lower extremity edema still present due to lymphedema.  No major issues or infections.  Tolerating Zytiga reasonably well.    Past medical history, social history and family history was reviewed and unchanged from prior visit.    Review of Systems:    Review of Systems   Constitutional: Positive for fatigue.   HENT:  Negative.    Eyes: Negative.    Respiratory: Negative.    Cardiovascular: Positive for leg swelling.   Gastrointestinal: Positive for constipation.   Endocrine: Negative.    Genitourinary: Negative.     Musculoskeletal: Negative.    Skin: Negative.    Neurological: Negative.    Hematological: Negative.    Psychiatric/Behavioral: Positive for sleep disturbance.      A comprehensive 14 point review of systems was performed and was negative except as mentioned.      Medications:  The current medication list was reviewed in the EMR    ALLERGIES:    Allergies   Allergen Reactions   • Bactrim [Sulfamethoxazole-Trimethoprim] Anaphylaxis         Physical Exam    VITAL SIGNS:  /87   Pulse 101   Temp 98.5 °F (36.9 °C) (Temporal)   Resp 18   Ht 172.7 cm (68\")   Wt 74.4 kg (164 lb)   BMI 24.94 kg/m²     Wt Readings from Last 3 Encounters:   12/17/18 " 74.4 kg (164 lb)   10/01/18 70.3 kg (155 lb)   09/27/18 69.9 kg (154 lb)        Performance Status: 0    General: well appearing, in no acute distress  HEENT: sclera anicteric, oropharynx clear, neck is supple  Lymphatics: no cervical, supraclavicular, or axillary adenopathy  Cardiovascular: regular rate and rhythm, no murmurs, rubs or gallops  Lungs: clear to auscultation bilaterally  Abdomen: soft, nontender, nondistended.  No palpable organomegaly  Extremities: + Left  lower extremity edema  Skin: no rashes, lesions, bruising, or petechiae  Msk:  Shows no weakness of the large muscle groups  Psych: Mood is stable    Lab Results   Component Value Date    HGB 12.0 (L) 12/17/2018    HCT 36.3 (L) 12/17/2018    MCV 94.8 12/17/2018     12/17/2018    WBC 12.10 (H) 12/17/2018    NEUTROABS 9.00 (H) 12/17/2018    LYMPHSABS 2.80 12/17/2018    MONOSABS 0.30 12/17/2018    EOSABS 0.04 10/12/2018    BASOSABS 0.03 10/12/2018     Lab Results   Component Value Date    GLUCOSE 103 (H) 10/12/2018    BUN 26 (H) 10/12/2018    CREATININE 1.27 10/12/2018     10/12/2018    K 4.1 10/12/2018     10/12/2018    CO2 30.0 10/12/2018    CALCIUM 9.1 10/12/2018    PROTEINTOT 6.3 10/12/2018    ALBUMIN 3.67 10/12/2018    BILITOT 0.7 10/12/2018    ALKPHOS 104 (H) 10/12/2018    AST 27 10/12/2018    ALT 22 10/12/2018       Lab Results   Component Value Date    PSA 0.700 10/12/2018    PSA 0.370 08/27/2018    PSA 0.310 08/06/2018    PSA 0.300 07/16/2018    PSA 0.410 06/20/2018       Ct Chest With Contrast    Result Date: 10/12/2018  1. No clear evidence of metastatic disease in the chest. 2. Progression of retroperitoneal adenopathy. 3. L1 mixed metastasis.  D:  10/12/2018 E:  10/12/2018  This report was finalized on 10/12/2018 4:07 PM by Clay Freeman.      Nm Bone Scan Whole Body    Result Date: 10/12/2018  Stable uptake of tracer seen within the L1 vertebral body. There is no evidence of new tracer uptake identified. The examination  is stable.  D:  10/12/2018 E:  10/12/2018    This report was finalized on 10/12/2018 4:54 PM by Dr. Indy Whitlock MD.      Ct Abdomen Pelvis With Contrast    Result Date: 10/12/2018  1. No clear evidence of metastatic disease in the chest. 2. Progression of retroperitoneal adenopathy. 3. L1 mixed metastasis.  D:  10/12/2018 E:  10/12/2018  This report was finalized on 10/12/2018 4:07 PM by Clay Freeman.            Assessment/Plan    1.  Locally advanced metastatic prostate cancer: Continue Eligard .  He is currently on Zytiga and prednisone.  Tolerating it well.  Continue treatment with no changes.  Plan to get CAT scans in February 2019.    2.  Insomnia: on restoril    3.  Left lower extremity edema secondary to lymphadenopathy - may need Lymphedema clinic if there is no improvement soon.    I spent 25  minutes on the patient's plan and care with over 50% spent counseling the patient      Jolynn Dao MD  [facility] Hematology and Oncology    12/17/2018     Return on: 02/08/19    Orders Placed This Encounter   Procedures   • CT Abdomen Pelvis With Contrast     Standing Status:   Future     Standing Expiration Date:   12/17/2019     Order Specific Question:   Will Oral Contrast be needed for this procedure?     Answer:   Yes   • Comprehensive Metabolic Panel     Standing Status:   Future     Standing Expiration Date:   12/17/2019   • PSA Diagnostic     Standing Status:   Future     Standing Expiration Date:   12/17/2019   • CBC & Differential     Standing Status:   Future     Standing Expiration Date:   12/17/2019     Order Specific Question:   Manual Differential     Answer:   No         Please note that portions of this note may have been completed with a voice recognition program. Efforts were made to edit the dictations, but occasionally words are mistranscribed.

## 2018-12-18 NOTE — PLAN OF CARE
"Oral Chemotherapy Teaching      Patient Name/:  Sadi Stone   1955  Oral Chemotherapy Regimen:  Zytiga 500 mg tablets - 2 tablets (1000 mg) po daily in addition to prednisone.   Date Started Medication: 10/3/18    Initial Teaching Follow Up Comments     Safety     Storage instructions (away from children; away from heat/cold, sunlight, or moisture), handling - use of gloves (caregivers), washing hands after touching pills, managing waste     “How are you storing your medications?”, reminders on storage, proper handling (caregivers using gloves, washing hands, away from children, managing waste, etc.), disposal of medication with D/C or dosage change    Pt keeps this medication in its secure prescription vial and expressed he handles this with cautioun. Encouraged hand washing after administration.      Adherence      patient and/or caregiver on how to take medication, take with/without food, assess their adherence potential, stress importance of adherence, ways to manage adherence (pill boxes, phone reminders, calendars), what to do if miss a dose   “How are you taking your medication?” “How are you remembering to take your medication?”, “How many doses have you missed?”, determine reasons for non-adherence (not remembering, side effects, etc), ways to improve, overadherence? Remind patient of ways to improve/maintain adherence   Pt confirmed he takes this medication once a day in the morning without food. He denied missing his doses.      Side Effects/Adverse Reactions      patient on potential side effects, s/s, ways to manage, when to call MD/seek help     Determine if patient experiencing side effects, ways to manage  Pt noted feeling occassionally as if his \"toes went numb\" and had lower leg swelling; however, he said it didn't brother him. Encouraged patient to reach out to the clinic if these symptons become bothersome.  Pt denied symptoms of fatigue or joint pain.     Miscellaneous "     Food interactions, DDIs, financial issues Determine if patient started any new medications since being placed on oral chemo (analyze for DDI) Zytiga in combination with tamsulosin may increase hypotension. Will monitor,no complaints from patient.      Additional Notes: Pt was counseled over the phone. Voiced understanding. He is due for a refill in about a week and said he would give us a call when to refill his prescription. Will f/u with patient if there is no call for refill.

## 2018-12-20 NOTE — PROGRESS NOTES
Chief Complaint   Patient presents with   • Cough     sinus congestion, drainage, dry cough, scratchy throat       Subjective   Sadi Stone Jr. is a 63 y.o. male    History of Present Illness  Cough- 3 days, sinus congestion, dry cough, scratchy throat, hot flashes, granddaughter is also sick. Took Tylenol- did not help. Pt has a HX of Cancer-prostate,     Allergies   Allergen Reactions   • Bactrim [Sulfamethoxazole-Trimethoprim] Anaphylaxis     Past Medical History:   Diagnosis Date   • Arthritis    • Glaucoma    • Peptic ulceration    • Prostate cancer (CMS/[facility]) 02/2018   • Ureteral obstruction, left 02/2018    due to metastatic prostate cancer      Past Surgical History:   Procedure Laterality Date   • KIDNEY SURGERY Left     removed a spot    • KNEE SURGERY     • SHOULDER SURGERY       Social History     Socioeconomic History   • Marital status:      Spouse name: Not on file   • Number of children: Not on file   • Years of education: Not on file   • Highest education level: Not on file   Social Needs   • Financial resource strain: Not on file   • Food insecurity - worry: Not on file   • Food insecurity - inability: Not on file   • Transportation needs - medical: Not on file   • Transportation needs - non-medical: Not on file   Occupational History   • Not on file   Tobacco Use   • Smoking status: Never Smoker   • Smokeless tobacco: Never Used   Substance and Sexual Activity   • Alcohol use: No   • Drug use: No   • Sexual activity: Not Currently   Other Topics Concern   • Not on file   Social History Narrative   • Not on file        Current Outpatient Medications:   •  abiraterone (ZYTIGA) 500 MG chemo tablet, Take 2 tablets by mouth Daily., Disp: 60 tablet, Rfl: 3  •  ALPHAGAN P 0.1 % solution ophthalmic solution, , Disp: , Rfl:   •  brimonidine (ALPHAGAN) 0.15 % ophthalmic solution, , Disp: , Rfl:   •  Multiple Vitamins-Minerals (MULTIVITAMIN ADULT PO), Take  by mouth., Disp: , Rfl:   •   omeprazole (priLOSEC) 40 MG capsule, Take 1 capsule by mouth Daily., Disp: 90 capsule, Rfl: 1  •  predniSONE (DELTASONE) 5 MG tablet, Take 1 tablet by mouth 2 (Two) Times a Day., Disp: 60 tablet, Rfl: 11  •  tamsulosin (FLOMAX) 0.4 MG capsule 24 hr capsule, Take 1 capsule by mouth Every Night., Disp: 90 capsule, Rfl: 3  •  amoxicillin (AMOXIL) 500 MG capsule, Take 1 capsule by mouth 3 (Three) Times a Day., Disp: 30 capsule, Rfl: 0  •  brompheniramine-pseudoephedrine-DM 30-2-10 MG/5ML syrup, Take 5 mL by mouth 4 (Four) Times a Day As Needed for Cough., Disp: 120 mL, Rfl: 1  •  ondansetron (ZOFRAN) 8 MG tablet, Take 1 tablet by mouth 3 (Three) Times a Day As Needed for Nausea or Vomiting., Disp: 30 tablet, Rfl: 5  •  temazepam (RESTORIL) 15 MG capsule, Take 2 capsules by mouth At Night As Needed for Sleep., Disp: 60 capsule, Rfl: 5    Current Facility-Administered Medications:   •  pneumococcal conj. 13-valent (PREVNAR-13) vaccine 0.5 mL, 0.5 mL, Intramuscular, Once, Jh Danielle APRN     Review of Systems   Constitutional: Positive for chills and fever (hot flashes).   HENT: Positive for congestion, postnasal drip, sinus pressure and sore throat. Negative for nosebleeds and sinus pain.    Respiratory: Positive for cough. Negative for shortness of breath and wheezing.    Cardiovascular: Negative for chest pain.   Genitourinary: Negative for difficulty urinating and dysuria.   Musculoskeletal: Negative for myalgias.   Psychiatric/Behavioral: Positive for sleep disturbance.       Objective     Vitals:    12/20/18 0943   BP: 136/84   Pulse: 84   Temp: 98.9 °F (37.2 °C)   SpO2: 96%       Results for orders placed or performed in visit on 12/17/18   Comprehensive metabolic panel   Result Value Ref Range    Glucose 124 (H) 70 - 100 mg/dL    BUN 28 (H) 9 - 23 mg/dL    Creatinine 1.33 (H) 0.60 - 1.30 mg/dL    Sodium 140 132 - 146 mmol/L    Potassium 4.4 3.5 - 5.5 mmol/L    Chloride 106 99 - 109 mmol/L    CO2 30.0 20.0  - 31.0 mmol/L    Calcium 9.2 8.7 - 10.4 mg/dL    Total Protein 6.5 5.7 - 8.2 g/dL    Albumin 3.83 3.20 - 4.80 g/dL    ALT (SGPT) 21 7 - 40 U/L    AST (SGOT) 23 0 - 33 U/L    Alkaline Phosphatase 123 (H) 25 - 100 U/L    Total Bilirubin 0.8 0.3 - 1.2 mg/dL    eGFR  African Amer 65 >60 mL/min/1.73    Globulin 2.7 gm/dL    A/G Ratio 1.4 (L) 1.5 - 2.5 g/dL    BUN/Creatinine Ratio 21.1 7.0 - 25.0    Anion Gap 4.0 3.0 - 11.0 mmol/L   PSA Diagnostic   Result Value Ref Range    PSA 1.770 0.000 - 4.000 ng/mL   CBC Auto Differential   Result Value Ref Range    WBC 12.10 (H) 3.50 - 10.80 10*3/mm3    RBC 3.83 (L) 4.20 - 5.76 10*6/mm3    Hemoglobin 12.0 (L) 13.1 - 17.5 g/dL    Hematocrit 36.3 (L) 38.9 - 50.9 %    RDW 14.8 (H) 11.3 - 14.5 %    MCV 94.8 80.0 - 99.0 fL    MCH 31.4 (H) 27.0 - 31.0 pg    MCHC 33.1 32.0 - 36.0 g/dL    MPV 7.2 6.0 - 12.0 fL    Platelets 268 150 - 450 10*3/mm3    Neutrophil % 74.3 (H) 41.0 - 71.0 %    Lymphocyte % 23.0 (L) 24.0 - 44.0 %    Monocyte % 2.7 0.0 - 12.0 %    Neutrophils, Absolute 9.00 (H) 1.50 - 8.30 10*3/mm3    Lymphocytes, Absolute 2.80 0.60 - 4.80 10*3/mm3    Monocytes, Absolute 0.30 0.00 - 1.00 10*3/mm3       Physical Exam   Constitutional: He is oriented to person, place, and time. He appears well-developed and well-nourished.   HENT:   Head: Normocephalic and atraumatic.   Right Ear: Hearing and external ear normal. A middle ear effusion is present.   Left Ear: Hearing and external ear normal. A middle ear effusion is present.   Nose: Rhinorrhea present. Right sinus exhibits no maxillary sinus tenderness and no frontal sinus tenderness. Left sinus exhibits no maxillary sinus tenderness and no frontal sinus tenderness.   Mouth/Throat: Uvula is midline. Posterior oropharyngeal erythema present.   Cardiovascular: Normal rate, regular rhythm and normal heart sounds.   Pulmonary/Chest: Effort normal and breath sounds normal.   Musculoskeletal: He exhibits no edema.   Neurological: He is  alert and oriented to person, place, and time.   Skin: Skin is warm and dry.   Psychiatric: He has a normal mood and affect. His behavior is normal.   Vitals reviewed.      Assessment/Plan   Problem List Items Addressed This Visit     None      Visit Diagnoses     Acute upper respiratory infection    -  Primary    Relevant Medications    amoxicillin (AMOXIL) 500 MG capsule    brompheniramine-pseudoephedrine-DM 30-2-10 MG/5ML syrup       Patient to take medications as directed. Make sure to take all of them. Return if no improvement or worsens in 5-7 days. If concerned after hours, may go to [facility] or ER for evaluation/Treatment.  Plan of care reviewed with patient at the conclusion of today's visit. Education was provided regarding diagnosis, management and any prescribed or recommended OTC medications. Patient verbalizes understanding of and agreement with management plan.      Jh Danielle, BENI   12/20/2018   10:24 AM

## 2019-01-01 ENCOUNTER — OFFICE VISIT (OUTPATIENT)
Dept: ONCOLOGY | Facility: CLINIC | Age: 64
End: 2019-01-01

## 2019-01-01 ENCOUNTER — TELEPHONE (OUTPATIENT)
Dept: GASTROENTEROLOGY | Facility: CLINIC | Age: 64
End: 2019-01-01

## 2019-01-01 ENCOUNTER — HOSPITAL ENCOUNTER (OUTPATIENT)
Dept: ONCOLOGY | Facility: HOSPITAL | Age: 64
Setting detail: INFUSION SERIES
Discharge: HOME | End: 2019-06-17

## 2019-01-01 ENCOUNTER — LAB (OUTPATIENT)
Dept: LAB | Facility: HOSPITAL | Age: 64
End: 2019-01-01

## 2019-01-01 ENCOUNTER — TELEPHONE (OUTPATIENT)
Dept: ONCOLOGY | Facility: CLINIC | Age: 64
End: 2019-01-01

## 2019-01-01 ENCOUNTER — TELEPHONE (OUTPATIENT)
Dept: FAMILY MEDICINE CLINIC | Facility: CLINIC | Age: 64
End: 2019-01-01

## 2019-01-01 ENCOUNTER — OFFICE VISIT (OUTPATIENT)
Dept: FAMILY MEDICINE CLINIC | Facility: CLINIC | Age: 64
End: 2019-01-01

## 2019-01-01 ENCOUNTER — HOSPITAL ENCOUNTER (OUTPATIENT)
Dept: CT IMAGING | Facility: HOSPITAL | Age: 64
Discharge: HOME | End: 2019-04-22
Admitting: INTERNAL MEDICINE

## 2019-01-01 ENCOUNTER — APPOINTMENT (OUTPATIENT)
Dept: ONCOLOGY | Facility: HOSPITAL | Age: 64
End: 2019-01-01

## 2019-01-01 ENCOUNTER — HOSPITAL ENCOUNTER (OUTPATIENT)
Dept: ONCOLOGY | Facility: HOSPITAL | Age: 64
Setting detail: INFUSION SERIES
Discharge: HOME | End: 2019-08-12

## 2019-01-01 ENCOUNTER — HOSPITAL ENCOUNTER (OUTPATIENT)
Dept: CT IMAGING | Facility: HOSPITAL | Age: 64
Discharge: HOME | End: 2019-01-18
Attending: INTERNAL MEDICINE | Admitting: INTERNAL MEDICINE

## 2019-01-01 ENCOUNTER — APPOINTMENT (OUTPATIENT)
Dept: LAB | Facility: HOSPITAL | Age: 64
End: 2019-01-01

## 2019-01-01 ENCOUNTER — HOSPITAL ENCOUNTER (OUTPATIENT)
Dept: ONCOLOGY | Facility: HOSPITAL | Age: 64
Discharge: HOME | End: 2019-01-23

## 2019-01-01 ENCOUNTER — TELEPHONE (OUTPATIENT)
Dept: NUTRITION | Facility: HOSPITAL | Age: 64
End: 2019-01-01

## 2019-01-01 ENCOUNTER — SPECIALTY PHARMACY (OUTPATIENT)
Dept: ONCOLOGY | Facility: HOSPITAL | Age: 64
End: 2019-01-01

## 2019-01-01 ENCOUNTER — HOSPITAL ENCOUNTER (OUTPATIENT)
Dept: ONCOLOGY | Facility: HOSPITAL | Age: 64
Setting detail: INFUSION SERIES
Discharge: HOME | End: 2019-05-20

## 2019-01-01 ENCOUNTER — LAB REQUISITION (OUTPATIENT)
Dept: LAB | Facility: HOSPITAL | Age: 64
End: 2019-01-01

## 2019-01-01 ENCOUNTER — HOSPITAL ENCOUNTER (OUTPATIENT)
Dept: ONCOLOGY | Facility: HOSPITAL | Age: 64
Setting detail: INFUSION SERIES
Discharge: HOME | End: 2019-07-01

## 2019-01-01 ENCOUNTER — HOSPITAL ENCOUNTER (OUTPATIENT)
Dept: ONCOLOGY | Facility: HOSPITAL | Age: 64
Setting detail: INFUSION SERIES
Discharge: HOME | End: 2019-05-28

## 2019-01-01 ENCOUNTER — HOSPITAL ENCOUNTER (OUTPATIENT)
Dept: CT IMAGING | Facility: HOSPITAL | Age: 64
Discharge: HOME | End: 2019-08-30
Admitting: INTERNAL MEDICINE

## 2019-01-01 ENCOUNTER — HOSPITAL ENCOUNTER (OUTPATIENT)
Dept: ONCOLOGY | Facility: HOSPITAL | Age: 64
Discharge: HOME | End: 2019-07-01

## 2019-01-01 ENCOUNTER — APPOINTMENT (OUTPATIENT)
Dept: CT IMAGING | Facility: HOSPITAL | Age: 64
End: 2019-01-01

## 2019-01-01 ENCOUNTER — OUTSIDE FACILITY SERVICE (OUTPATIENT)
Dept: GASTROENTEROLOGY | Facility: CLINIC | Age: 64
End: 2019-01-01

## 2019-01-01 ENCOUNTER — EDUCATION (OUTPATIENT)
Dept: ONCOLOGY | Facility: HOSPITAL | Age: 64
End: 2019-01-01

## 2019-01-01 ENCOUNTER — HOSPITAL ENCOUNTER (OUTPATIENT)
Dept: ONCOLOGY | Facility: HOSPITAL | Age: 64
Setting detail: INFUSION SERIES
Discharge: HOME | End: 2019-06-10

## 2019-01-01 ENCOUNTER — DOCUMENTATION (OUTPATIENT)
Dept: NUTRITION | Facility: HOSPITAL | Age: 64
End: 2019-01-01

## 2019-01-01 ENCOUNTER — HOSPITAL ENCOUNTER (OUTPATIENT)
Dept: CT IMAGING | Facility: HOSPITAL | Age: 64
Discharge: HOME | End: 2019-06-20
Admitting: INTERNAL MEDICINE

## 2019-01-01 ENCOUNTER — HOSPITAL ENCOUNTER (OUTPATIENT)
Dept: ONCOLOGY | Facility: HOSPITAL | Age: 64
Setting detail: INFUSION SERIES
Discharge: HOME | End: 2019-07-12

## 2019-01-01 ENCOUNTER — HOSPITAL ENCOUNTER (OUTPATIENT)
Dept: ONCOLOGY | Facility: HOSPITAL | Age: 64
Setting detail: INFUSION SERIES
Discharge: HOME | End: 2019-07-22

## 2019-01-01 ENCOUNTER — HOSPITAL ENCOUNTER (OUTPATIENT)
Dept: NUCLEAR MEDICINE | Facility: HOSPITAL | Age: 64
Discharge: HOME | End: 2019-04-22

## 2019-01-01 ENCOUNTER — HOSPITAL ENCOUNTER (OUTPATIENT)
Dept: ONCOLOGY | Facility: HOSPITAL | Age: 64
Setting detail: INFUSION SERIES
Discharge: HOME | End: 2019-08-23

## 2019-01-01 ENCOUNTER — HOSPITAL ENCOUNTER (OUTPATIENT)
Dept: ONCOLOGY | Facility: HOSPITAL | Age: 64
Setting detail: INFUSION SERIES
Discharge: HOME | End: 2019-05-06

## 2019-01-01 VITALS
RESPIRATION RATE: 18 BRPM | TEMPERATURE: 97.4 F | SYSTOLIC BLOOD PRESSURE: 144 MMHG | SYSTOLIC BLOOD PRESSURE: 148 MMHG | RESPIRATION RATE: 18 BRPM | HEIGHT: 68 IN | HEIGHT: 68 IN | WEIGHT: 169 LBS | DIASTOLIC BLOOD PRESSURE: 82 MMHG | TEMPERATURE: 98 F | BODY MASS INDEX: 25.46 KG/M2 | WEIGHT: 168 LBS | DIASTOLIC BLOOD PRESSURE: 75 MMHG | HEART RATE: 98 BPM | HEART RATE: 94 BPM | OXYGEN SATURATION: 98 % | OXYGEN SATURATION: 98 % | BODY MASS INDEX: 25.61 KG/M2

## 2019-01-01 VITALS
SYSTOLIC BLOOD PRESSURE: 116 MMHG | DIASTOLIC BLOOD PRESSURE: 65 MMHG | BODY MASS INDEX: 22.43 KG/M2 | WEIGHT: 148 LBS | OXYGEN SATURATION: 99 % | HEART RATE: 126 BPM | HEIGHT: 68 IN | TEMPERATURE: 98.5 F | RESPIRATION RATE: 20 BRPM

## 2019-01-01 VITALS
HEART RATE: 84 BPM | WEIGHT: 159 LBS | DIASTOLIC BLOOD PRESSURE: 88 MMHG | OXYGEN SATURATION: 99 % | SYSTOLIC BLOOD PRESSURE: 148 MMHG | TEMPERATURE: 97.7 F | BODY MASS INDEX: 24.1 KG/M2 | HEIGHT: 68 IN | RESPIRATION RATE: 20 BRPM

## 2019-01-01 VITALS
HEIGHT: 68 IN | HEART RATE: 97 BPM | OXYGEN SATURATION: 98 % | TEMPERATURE: 98.3 F | DIASTOLIC BLOOD PRESSURE: 60 MMHG | DIASTOLIC BLOOD PRESSURE: 64 MMHG | HEART RATE: 106 BPM | WEIGHT: 149 LBS | SYSTOLIC BLOOD PRESSURE: 131 MMHG | HEIGHT: 68 IN | BODY MASS INDEX: 22.58 KG/M2 | RESPIRATION RATE: 16 BRPM | WEIGHT: 146 LBS | SYSTOLIC BLOOD PRESSURE: 120 MMHG | BODY MASS INDEX: 22.13 KG/M2 | TEMPERATURE: 96.9 F

## 2019-01-01 VITALS
HEIGHT: 68 IN | SYSTOLIC BLOOD PRESSURE: 117 MMHG | DIASTOLIC BLOOD PRESSURE: 63 MMHG | TEMPERATURE: 98.5 F | BODY MASS INDEX: 23.19 KG/M2 | RESPIRATION RATE: 16 BRPM | HEART RATE: 99 BPM | WEIGHT: 153 LBS

## 2019-01-01 VITALS
SYSTOLIC BLOOD PRESSURE: 97 MMHG | RESPIRATION RATE: 20 BRPM | HEART RATE: 93 BPM | BODY MASS INDEX: 21.19 KG/M2 | HEIGHT: 68 IN | DIASTOLIC BLOOD PRESSURE: 61 MMHG | OXYGEN SATURATION: 98 % | WEIGHT: 139.8 LBS | TEMPERATURE: 97.3 F

## 2019-01-01 VITALS
HEIGHT: 68 IN | DIASTOLIC BLOOD PRESSURE: 76 MMHG | SYSTOLIC BLOOD PRESSURE: 144 MMHG | HEART RATE: 76 BPM | BODY MASS INDEX: 23.49 KG/M2 | RESPIRATION RATE: 28 BRPM | BODY MASS INDEX: 22.28 KG/M2 | WEIGHT: 155 LBS | OXYGEN SATURATION: 100 % | RESPIRATION RATE: 20 BRPM | TEMPERATURE: 97.2 F | TEMPERATURE: 98 F | WEIGHT: 147 LBS | OXYGEN SATURATION: 98 % | DIASTOLIC BLOOD PRESSURE: 80 MMHG | SYSTOLIC BLOOD PRESSURE: 146 MMHG | HEIGHT: 68 IN | HEART RATE: 99 BPM

## 2019-01-01 VITALS
HEART RATE: 114 BPM | WEIGHT: 165 LBS | RESPIRATION RATE: 18 BRPM | HEIGHT: 68 IN | OXYGEN SATURATION: 99 % | SYSTOLIC BLOOD PRESSURE: 125 MMHG | TEMPERATURE: 97 F | DIASTOLIC BLOOD PRESSURE: 77 MMHG | BODY MASS INDEX: 25.01 KG/M2

## 2019-01-01 VITALS
SYSTOLIC BLOOD PRESSURE: 97 MMHG | RESPIRATION RATE: 16 BRPM | HEART RATE: 98 BPM | TEMPERATURE: 98.4 F | WEIGHT: 140.9 LBS | DIASTOLIC BLOOD PRESSURE: 65 MMHG | BODY MASS INDEX: 21.42 KG/M2

## 2019-01-01 VITALS
BODY MASS INDEX: 24.71 KG/M2 | OXYGEN SATURATION: 99 % | SYSTOLIC BLOOD PRESSURE: 120 MMHG | HEIGHT: 68 IN | DIASTOLIC BLOOD PRESSURE: 60 MMHG | WEIGHT: 163 LBS | HEART RATE: 87 BPM | TEMPERATURE: 99.1 F

## 2019-01-01 VITALS
SYSTOLIC BLOOD PRESSURE: 107 MMHG | DIASTOLIC BLOOD PRESSURE: 63 MMHG | BODY MASS INDEX: 22.43 KG/M2 | RESPIRATION RATE: 20 BRPM | TEMPERATURE: 99 F | WEIGHT: 148 LBS | HEIGHT: 68 IN | HEART RATE: 109 BPM

## 2019-01-01 VITALS
BODY MASS INDEX: 21.7 KG/M2 | RESPIRATION RATE: 16 BRPM | HEART RATE: 59 BPM | SYSTOLIC BLOOD PRESSURE: 128 MMHG | OXYGEN SATURATION: 98 % | HEIGHT: 68 IN | WEIGHT: 143.19 LBS | TEMPERATURE: 97.8 F | DIASTOLIC BLOOD PRESSURE: 52 MMHG

## 2019-01-01 VITALS
HEART RATE: 57 BPM | WEIGHT: 158 LBS | HEIGHT: 68 IN | DIASTOLIC BLOOD PRESSURE: 80 MMHG | BODY MASS INDEX: 23.95 KG/M2 | SYSTOLIC BLOOD PRESSURE: 118 MMHG | OXYGEN SATURATION: 98 % | TEMPERATURE: 97.3 F

## 2019-01-01 VITALS
DIASTOLIC BLOOD PRESSURE: 68 MMHG | TEMPERATURE: 98.4 F | SYSTOLIC BLOOD PRESSURE: 116 MMHG | HEIGHT: 68 IN | WEIGHT: 145 LBS | BODY MASS INDEX: 21.98 KG/M2 | HEART RATE: 129 BPM | RESPIRATION RATE: 18 BRPM

## 2019-01-01 VITALS
BODY MASS INDEX: 25.61 KG/M2 | WEIGHT: 169 LBS | DIASTOLIC BLOOD PRESSURE: 94 MMHG | TEMPERATURE: 97.9 F | HEART RATE: 61 BPM | RESPIRATION RATE: 18 BRPM | HEIGHT: 68 IN | OXYGEN SATURATION: 98 % | SYSTOLIC BLOOD PRESSURE: 147 MMHG

## 2019-01-01 VITALS
BODY MASS INDEX: 23.19 KG/M2 | WEIGHT: 153 LBS | SYSTOLIC BLOOD PRESSURE: 140 MMHG | DIASTOLIC BLOOD PRESSURE: 71 MMHG | HEIGHT: 68 IN | TEMPERATURE: 98.1 F | OXYGEN SATURATION: 100 % | HEART RATE: 94 BPM | RESPIRATION RATE: 20 BRPM

## 2019-01-01 VITALS
HEIGHT: 68 IN | HEART RATE: 109 BPM | BODY MASS INDEX: 21.13 KG/M2 | DIASTOLIC BLOOD PRESSURE: 61 MMHG | TEMPERATURE: 98.1 F | WEIGHT: 139.4 LBS | RESPIRATION RATE: 20 BRPM | SYSTOLIC BLOOD PRESSURE: 115 MMHG

## 2019-01-01 VITALS
SYSTOLIC BLOOD PRESSURE: 133 MMHG | BODY MASS INDEX: 22.22 KG/M2 | TEMPERATURE: 98 F | WEIGHT: 146.6 LBS | HEIGHT: 68 IN | RESPIRATION RATE: 20 BRPM | DIASTOLIC BLOOD PRESSURE: 64 MMHG | HEART RATE: 95 BPM

## 2019-01-01 VITALS
DIASTOLIC BLOOD PRESSURE: 58 MMHG | BODY MASS INDEX: 22.13 KG/M2 | TEMPERATURE: 97.3 F | HEIGHT: 68 IN | HEART RATE: 63 BPM | RESPIRATION RATE: 20 BRPM | SYSTOLIC BLOOD PRESSURE: 112 MMHG | WEIGHT: 146 LBS

## 2019-01-01 DIAGNOSIS — C61 MALIGNANT TUMOR OF PROSTATE (HCC): ICD-10-CM

## 2019-01-01 DIAGNOSIS — K27.9 PUD (PEPTIC ULCER DISEASE): ICD-10-CM

## 2019-01-01 DIAGNOSIS — N39.0 RECURRENT UTI: Primary | ICD-10-CM

## 2019-01-01 DIAGNOSIS — R10.13 EPIGASTRIC PAIN: ICD-10-CM

## 2019-01-01 DIAGNOSIS — C61 MALIGNANT TUMOR OF PROSTATE (HCC): Primary | ICD-10-CM

## 2019-01-01 DIAGNOSIS — R10.13 EPIGASTRIC PAIN: Primary | ICD-10-CM

## 2019-01-01 DIAGNOSIS — S67.191A CRUSHING INJURY OF LEFT INDEX FINGER, INITIAL ENCOUNTER: ICD-10-CM

## 2019-01-01 DIAGNOSIS — K76.9 LIVER LESION, LEFT LOBE: ICD-10-CM

## 2019-01-01 DIAGNOSIS — K21.00 GASTROESOPHAGEAL REFLUX DISEASE WITH ESOPHAGITIS: ICD-10-CM

## 2019-01-01 DIAGNOSIS — N40.1 BENIGN PROSTATIC HYPERPLASIA WITH NOCTURIA: ICD-10-CM

## 2019-01-01 DIAGNOSIS — N13.30 HYDRONEPHROSIS OF LEFT KIDNEY: Primary | ICD-10-CM

## 2019-01-01 DIAGNOSIS — N13.30 HYDRONEPHROSIS OF LEFT KIDNEY: ICD-10-CM

## 2019-01-01 DIAGNOSIS — R13.10 DYSPHAGIA, UNSPECIFIED TYPE: ICD-10-CM

## 2019-01-01 DIAGNOSIS — K21.9 GASTROESOPHAGEAL REFLUX DISEASE, ESOPHAGITIS PRESENCE NOT SPECIFIED: ICD-10-CM

## 2019-01-01 DIAGNOSIS — R13.14 DYSPHAGIA, PHARYNGOESOPHAGEAL PHASE: ICD-10-CM

## 2019-01-01 DIAGNOSIS — R35.1 BENIGN PROSTATIC HYPERPLASIA WITH NOCTURIA: ICD-10-CM

## 2019-01-01 LAB
ALBUMIN SERPL-MCNC: 2.5 G/DL (ref 3.5–5.2)
ALBUMIN SERPL-MCNC: 2.6 G/DL (ref 3.5–5.2)
ALBUMIN SERPL-MCNC: 2.7 G/DL (ref 3.5–5.2)
ALBUMIN SERPL-MCNC: 2.9 G/DL (ref 3.5–5.2)
ALBUMIN SERPL-MCNC: 3.1 G/DL (ref 3.5–5.2)
ALBUMIN SERPL-MCNC: 3.4 G/DL (ref 3.5–5.2)
ALBUMIN SERPL-MCNC: 3.5 G/DL (ref 3.5–5.2)
ALBUMIN SERPL-MCNC: 3.55 G/DL (ref 3.2–4.8)
ALBUMIN SERPL-MCNC: 3.62 G/DL (ref 3.2–4.8)
ALBUMIN/GLOB SERPL: 0.8 G/DL
ALBUMIN/GLOB SERPL: 0.9 G/DL
ALBUMIN/GLOB SERPL: 1 G/DL
ALBUMIN/GLOB SERPL: 1.1 G/DL
ALBUMIN/GLOB SERPL: 1.3 G/DL (ref 1.5–2.5)
ALBUMIN/GLOB SERPL: 1.4 G/DL (ref 1.5–2.5)
ALP SERPL-CCNC: 115 U/L (ref 25–100)
ALP SERPL-CCNC: 120 U/L (ref 39–117)
ALP SERPL-CCNC: 120 U/L (ref 39–117)
ALP SERPL-CCNC: 130 U/L (ref 25–100)
ALP SERPL-CCNC: 78 U/L (ref 39–117)
ALP SERPL-CCNC: 80 U/L (ref 39–117)
ALP SERPL-CCNC: 81 U/L (ref 39–117)
ALP SERPL-CCNC: 88 U/L (ref 39–117)
ALP SERPL-CCNC: 94 U/L (ref 39–117)
ALT SERPL W P-5'-P-CCNC: 10 U/L (ref 7–40)
ALT SERPL W P-5'-P-CCNC: 15 U/L (ref 1–41)
ALT SERPL W P-5'-P-CCNC: 18 U/L (ref 1–41)
ALT SERPL W P-5'-P-CCNC: 20 U/L (ref 7–40)
ALT SERPL W P-5'-P-CCNC: 8 U/L (ref 1–41)
ALT SERPL W P-5'-P-CCNC: 8 U/L (ref 1–41)
ALT SERPL W P-5'-P-CCNC: 9 U/L (ref 1–41)
ANION GAP SERPL CALCULATED.3IONS-SCNC: 10 MMOL/L
ANION GAP SERPL CALCULATED.3IONS-SCNC: 10 MMOL/L (ref 5–15)
ANION GAP SERPL CALCULATED.3IONS-SCNC: 10 MMOL/L (ref 5–15)
ANION GAP SERPL CALCULATED.3IONS-SCNC: 13 MMOL/L
ANION GAP SERPL CALCULATED.3IONS-SCNC: 14 MMOL/L
ANION GAP SERPL CALCULATED.3IONS-SCNC: 5 MMOL/L (ref 3–11)
ANION GAP SERPL CALCULATED.3IONS-SCNC: 8 MMOL/L (ref 3–11)
ANION GAP SERPL CALCULATED.3IONS-SCNC: 9 MMOL/L
ANION GAP SERPL CALCULATED.3IONS-SCNC: 9 MMOL/L (ref 5–15)
AST SERPL-CCNC: 24 U/L (ref 1–40)
AST SERPL-CCNC: 27 U/L (ref 1–40)
AST SERPL-CCNC: 28 U/L (ref 0–33)
AST SERPL-CCNC: 28 U/L (ref 0–33)
AST SERPL-CCNC: 28 U/L (ref 1–40)
AST SERPL-CCNC: 29 U/L (ref 1–40)
AST SERPL-CCNC: 30 U/L (ref 1–40)
AST SERPL-CCNC: 32 U/L (ref 1–40)
AST SERPL-CCNC: 41 U/L (ref 1–40)
BILIRUB BLD-MCNC: NEGATIVE MG/DL
BILIRUB SERPL-MCNC: 0.4 MG/DL (ref 0.2–1.2)
BILIRUB SERPL-MCNC: 0.6 MG/DL (ref 0.2–1.2)
BILIRUB SERPL-MCNC: 0.7 MG/DL (ref 0.2–1.2)
BILIRUB SERPL-MCNC: 0.7 MG/DL (ref 0.3–1.2)
BILIRUB SERPL-MCNC: 1.2 MG/DL (ref 0.3–1.2)
BUN BLD-MCNC: 17 MG/DL (ref 8–23)
BUN BLD-MCNC: 18 MG/DL (ref 8–23)
BUN BLD-MCNC: 19 MG/DL (ref 8–23)
BUN BLD-MCNC: 22 MG/DL (ref 8–23)
BUN BLD-MCNC: 23 MG/DL (ref 8–23)
BUN BLD-MCNC: 24 MG/DL (ref 9–23)
BUN BLD-MCNC: 27 MG/DL (ref 8–23)
BUN BLD-MCNC: 28 MG/DL (ref 9–23)
BUN BLD-MCNC: 31 MG/DL (ref 8–23)
BUN/CREAT SERPL: 12.5 (ref 7–25)
BUN/CREAT SERPL: 12.9 (ref 7–25)
BUN/CREAT SERPL: 13.8 (ref 7–25)
BUN/CREAT SERPL: 15.8 (ref 7–25)
BUN/CREAT SERPL: 18.5 (ref 7–25)
BUN/CREAT SERPL: 19.8 (ref 7–25)
BUN/CREAT SERPL: 22.4 (ref 7–25)
BUN/CREAT SERPL: 22.5 (ref 7–25)
BUN/CREAT SERPL: 24 (ref 7–25)
CALCIUM SPEC-SCNC: 8 MG/DL (ref 8.6–10.5)
CALCIUM SPEC-SCNC: 8.3 MG/DL (ref 8.6–10.5)
CALCIUM SPEC-SCNC: 8.4 MG/DL (ref 8.6–10.5)
CALCIUM SPEC-SCNC: 8.8 MG/DL (ref 8.6–10.5)
CALCIUM SPEC-SCNC: 8.9 MG/DL (ref 8.6–10.5)
CALCIUM SPEC-SCNC: 9 MG/DL (ref 8.7–10.4)
CALCIUM SPEC-SCNC: 9.3 MG/DL (ref 8.6–10.5)
CALCIUM SPEC-SCNC: 9.4 MG/DL (ref 8.7–10.4)
CALCIUM SPEC-SCNC: 9.6 MG/DL (ref 8.6–10.5)
CHLORIDE SERPL-SCNC: 101 MMOL/L (ref 98–107)
CHLORIDE SERPL-SCNC: 104 MMOL/L (ref 98–107)
CHLORIDE SERPL-SCNC: 105 MMOL/L (ref 99–109)
CHLORIDE SERPL-SCNC: 105 MMOL/L (ref 99–109)
CHLORIDE SERPL-SCNC: 107 MMOL/L (ref 98–107)
CHLORIDE SERPL-SCNC: 110 MMOL/L (ref 98–107)
CHLORIDE SERPL-SCNC: 96 MMOL/L (ref 98–107)
CLARITY, POC: ABNORMAL
CO2 SERPL-SCNC: 19 MMOL/L (ref 22–29)
CO2 SERPL-SCNC: 20 MMOL/L (ref 22–29)
CO2 SERPL-SCNC: 21 MMOL/L (ref 22–29)
CO2 SERPL-SCNC: 21 MMOL/L (ref 22–29)
CO2 SERPL-SCNC: 23 MMOL/L (ref 22–29)
CO2 SERPL-SCNC: 23 MMOL/L (ref 22–29)
CO2 SERPL-SCNC: 25 MMOL/L (ref 22–29)
CO2 SERPL-SCNC: 26 MMOL/L (ref 20–31)
CO2 SERPL-SCNC: 29 MMOL/L (ref 20–31)
COLOR UR: YELLOW
CREAT BLD-MCNC: 1.11 MG/DL (ref 0.76–1.27)
CREAT BLD-MCNC: 1.2 MG/DL (ref 0.76–1.27)
CREAT BLD-MCNC: 1.23 MG/DL (ref 0.76–1.27)
CREAT BLD-MCNC: 1.25 MG/DL (ref 0.6–1.3)
CREAT BLD-MCNC: 1.29 MG/DL (ref 0.76–1.27)
CREAT BLD-MCNC: 1.3 MG/DL (ref 0.6–1.3)
CREAT BLD-MCNC: 1.4 MG/DL (ref 0.76–1.27)
CREAT BLD-MCNC: 1.46 MG/DL (ref 0.76–1.27)
CREAT BLD-MCNC: 1.52 MG/DL (ref 0.76–1.27)
CREAT BLDA-MCNC: 1.2 MG/DL (ref 0.6–1.3)
CREAT BLDA-MCNC: 1.3 MG/DL
CREAT BLDA-MCNC: 1.3 MG/DL (ref 0.6–1.3)
CREAT BLDA-MCNC: 1.3 MG/DL (ref 0.6–1.3)
CYTO UR: NORMAL
ERYTHROCYTE [DISTWIDTH] IN BLOOD BY AUTOMATED COUNT: 13.6 % (ref 12.3–15.4)
ERYTHROCYTE [DISTWIDTH] IN BLOOD BY AUTOMATED COUNT: 14 % (ref 12.3–15.4)
ERYTHROCYTE [DISTWIDTH] IN BLOOD BY AUTOMATED COUNT: 14.2 % (ref 11.3–14.5)
ERYTHROCYTE [DISTWIDTH] IN BLOOD BY AUTOMATED COUNT: 14.3 % (ref 12.3–15.4)
ERYTHROCYTE [DISTWIDTH] IN BLOOD BY AUTOMATED COUNT: 14.7 % (ref 11.3–14.5)
ERYTHROCYTE [DISTWIDTH] IN BLOOD BY AUTOMATED COUNT: 16.5 % (ref 12.3–15.4)
ERYTHROCYTE [DISTWIDTH] IN BLOOD BY AUTOMATED COUNT: 17.5 % (ref 12.3–15.4)
ERYTHROCYTE [DISTWIDTH] IN BLOOD BY AUTOMATED COUNT: 17.6 % (ref 12.3–15.4)
ERYTHROCYTE [DISTWIDTH] IN BLOOD BY AUTOMATED COUNT: 18 % (ref 12.3–15.4)
ERYTHROCYTE [DISTWIDTH] IN BLOOD BY AUTOMATED COUNT: 18.5 % (ref 12.3–15.4)
ERYTHROCYTE [DISTWIDTH] IN BLOOD BY AUTOMATED COUNT: 18.9 % (ref 12.3–15.4)
ERYTHROCYTE [DISTWIDTH] IN BLOOD BY AUTOMATED COUNT: 20.7 % (ref 12.3–15.4)
ERYTHROCYTE [DISTWIDTH] IN BLOOD BY AUTOMATED COUNT: 21.3 % (ref 12.3–15.4)
ERYTHROCYTE [DISTWIDTH] IN BLOOD BY AUTOMATED COUNT: 22.6 % (ref 12.3–15.4)
GFR SERPL CREATININE-BSD FRML MDRD: 56 ML/MIN/1.73
GFR SERPL CREATININE-BSD FRML MDRD: 59 ML/MIN/1.73
GFR SERPL CREATININE-BSD FRML MDRD: 61 ML/MIN/1.73
GFR SERPL CREATININE-BSD FRML MDRD: 67 ML/MIN/1.73
GFR SERPL CREATININE-BSD FRML MDRD: 68 ML/MIN/1.73
GFR SERPL CREATININE-BSD FRML MDRD: 70 ML/MIN/1.73
GFR SERPL CREATININE-BSD FRML MDRD: 71 ML/MIN/1.73
GFR SERPL CREATININE-BSD FRML MDRD: 73 ML/MIN/1.73
GFR SERPL CREATININE-BSD FRML MDRD: 80 ML/MIN/1.73
GLOBULIN UR ELPH-MCNC: 2.4 GM/DL
GLOBULIN UR ELPH-MCNC: 2.7 GM/DL
GLOBULIN UR ELPH-MCNC: 2.7 GM/DL
GLOBULIN UR ELPH-MCNC: 2.9 GM/DL
GLOBULIN UR ELPH-MCNC: 3.1 GM/DL
GLOBULIN UR ELPH-MCNC: 3.3 GM/DL
GLOBULIN UR ELPH-MCNC: 3.5 GM/DL
GLOBULIN UR ELPH-MCNC: 3.6 GM/DL
GLOBULIN UR ELPH-MCNC: 3.9 GM/DL
GLUCOSE BLD-MCNC: 103 MG/DL (ref 65–99)
GLUCOSE BLD-MCNC: 119 MG/DL (ref 70–100)
GLUCOSE BLD-MCNC: 125 MG/DL (ref 65–99)
GLUCOSE BLD-MCNC: 129 MG/DL (ref 65–99)
GLUCOSE BLD-MCNC: 135 MG/DL (ref 65–99)
GLUCOSE BLD-MCNC: 155 MG/DL (ref 65–99)
GLUCOSE BLD-MCNC: 156 MG/DL (ref 65–99)
GLUCOSE BLD-MCNC: 159 MG/DL (ref 65–99)
GLUCOSE BLD-MCNC: 98 MG/DL (ref 70–100)
GLUCOSE UR STRIP-MCNC: NEGATIVE MG/DL
HCT VFR BLD AUTO: 26.5 % (ref 37.5–51)
HCT VFR BLD AUTO: 26.6 % (ref 37.5–51)
HCT VFR BLD AUTO: 27.3 % (ref 37.5–51)
HCT VFR BLD AUTO: 27.5 % (ref 37.5–51)
HCT VFR BLD AUTO: 27.7 % (ref 37.5–51)
HCT VFR BLD AUTO: 28 % (ref 37.5–51)
HCT VFR BLD AUTO: 28.1 % (ref 37.5–51)
HCT VFR BLD AUTO: 28.5 % (ref 37.5–51)
HCT VFR BLD AUTO: 28.6 % (ref 37.5–51)
HCT VFR BLD AUTO: 29.4 % (ref 37.5–51)
HCT VFR BLD AUTO: 29.6 % (ref 37.5–51)
HCT VFR BLD AUTO: 32.6 % (ref 37.5–51)
HCT VFR BLD AUTO: 34.1 % (ref 38.9–50.9)
HCT VFR BLD AUTO: 34.5 % (ref 38.9–50.9)
HGB BLD-MCNC: 10.6 G/DL (ref 13–17.7)
HGB BLD-MCNC: 11 G/DL (ref 13.1–17.5)
HGB BLD-MCNC: 11.5 G/DL (ref 13.1–17.5)
HGB BLD-MCNC: 8.7 G/DL (ref 13–17.7)
HGB BLD-MCNC: 8.7 G/DL (ref 13–17.7)
HGB BLD-MCNC: 8.8 G/DL (ref 13–17.7)
HGB BLD-MCNC: 8.9 G/DL (ref 13–17.7)
HGB BLD-MCNC: 9 G/DL (ref 13–17.7)
HGB BLD-MCNC: 9 G/DL (ref 13–17.7)
HGB BLD-MCNC: 9.2 G/DL (ref 13–17.7)
HGB BLD-MCNC: 9.3 G/DL (ref 13–17.7)
HGB BLD-MCNC: 9.3 G/DL (ref 13–17.7)
HGB BLD-MCNC: 9.4 G/DL (ref 13–17.7)
HGB BLD-MCNC: 9.5 G/DL (ref 13–17.7)
KETONES UR QL: NEGATIVE
LAB AP CASE REPORT: NORMAL
LAB AP CLINICAL INFORMATION: NORMAL
LEUKOCYTE EST, POC: ABNORMAL
LYMPHOCYTES # BLD AUTO: 1.1 10*3/MM3 (ref 0.7–3.1)
LYMPHOCYTES # BLD AUTO: 1.1 10*3/MM3 (ref 0.7–3.1)
LYMPHOCYTES # BLD AUTO: 1.2 10*3/MM3 (ref 0.7–3.1)
LYMPHOCYTES # BLD AUTO: 1.3 10*3/MM3 (ref 0.7–3.1)
LYMPHOCYTES # BLD AUTO: 1.5 10*3/MM3 (ref 0.7–3.1)
LYMPHOCYTES # BLD AUTO: 1.7 10*3/MM3 (ref 0.7–3.1)
LYMPHOCYTES # BLD AUTO: 1.7 10*3/MM3 (ref 0.7–3.1)
LYMPHOCYTES # BLD AUTO: 1.8 10*3/MM3 (ref 0.7–3.1)
LYMPHOCYTES # BLD AUTO: 2 10*3/MM3 (ref 0.7–3.1)
LYMPHOCYTES # BLD AUTO: 2.3 10*3/MM3 (ref 0.7–3.1)
LYMPHOCYTES # BLD AUTO: 2.7 10*3/MM3 (ref 0.7–3.1)
LYMPHOCYTES # BLD AUTO: 3.2 10*3/MM3 (ref 0.6–4.8)
LYMPHOCYTES # BLD AUTO: 3.2 10*3/MM3 (ref 0.6–4.8)
LYMPHOCYTES # BLD AUTO: 3.7 10*3/MM3 (ref 0.7–3.1)
LYMPHOCYTES NFR BLD AUTO: 13.1 % (ref 19.6–45.3)
LYMPHOCYTES NFR BLD AUTO: 13.5 % (ref 19.6–45.3)
LYMPHOCYTES NFR BLD AUTO: 20.4 % (ref 19.6–45.3)
LYMPHOCYTES NFR BLD AUTO: 23.7 % (ref 19.6–45.3)
LYMPHOCYTES NFR BLD AUTO: 33.6 % (ref 24–44)
LYMPHOCYTES NFR BLD AUTO: 38.4 % (ref 19.6–45.3)
LYMPHOCYTES NFR BLD AUTO: 41 % (ref 24–44)
LYMPHOCYTES NFR BLD AUTO: 48 % (ref 19.6–45.3)
LYMPHOCYTES NFR BLD AUTO: 53.1 % (ref 19.6–45.3)
LYMPHOCYTES NFR BLD AUTO: 53.9 % (ref 19.6–45.3)
LYMPHOCYTES NFR BLD AUTO: 61.6 % (ref 19.6–45.3)
LYMPHOCYTES NFR BLD AUTO: 63.7 % (ref 19.6–45.3)
LYMPHOCYTES NFR BLD AUTO: 86.5 % (ref 19.6–45.3)
LYMPHOCYTES NFR BLD AUTO: 9.8 % (ref 19.6–45.3)
MCH RBC QN AUTO: 30.1 PG (ref 26.6–33)
MCH RBC QN AUTO: 30.8 PG (ref 27–31)
MCH RBC QN AUTO: 31.1 PG (ref 26.6–33)
MCH RBC QN AUTO: 31.3 PG (ref 26.6–33)
MCH RBC QN AUTO: 31.6 PG (ref 26.6–33)
MCH RBC QN AUTO: 31.7 PG (ref 26.6–33)
MCH RBC QN AUTO: 31.9 PG (ref 26.6–33)
MCH RBC QN AUTO: 31.9 PG (ref 26.6–33)
MCH RBC QN AUTO: 31.9 PG (ref 27–31)
MCH RBC QN AUTO: 32.4 PG (ref 26.6–33)
MCH RBC QN AUTO: 32.5 PG (ref 26.6–33)
MCH RBC QN AUTO: 32.9 PG (ref 26.6–33)
MCHC RBC AUTO-ENTMCNC: 31.2 G/DL (ref 31.5–35.7)
MCHC RBC AUTO-ENTMCNC: 31.4 G/DL (ref 31.5–35.7)
MCHC RBC AUTO-ENTMCNC: 32.1 G/DL (ref 31.5–35.7)
MCHC RBC AUTO-ENTMCNC: 32.2 G/DL (ref 31.5–35.7)
MCHC RBC AUTO-ENTMCNC: 32.4 G/DL (ref 31.5–35.7)
MCHC RBC AUTO-ENTMCNC: 32.4 G/DL (ref 32–36)
MCHC RBC AUTO-ENTMCNC: 32.6 G/DL (ref 31.5–35.7)
MCHC RBC AUTO-ENTMCNC: 32.7 G/DL (ref 31.5–35.7)
MCHC RBC AUTO-ENTMCNC: 32.9 G/DL (ref 31.5–35.7)
MCHC RBC AUTO-ENTMCNC: 33.2 G/DL (ref 31.5–35.7)
MCHC RBC AUTO-ENTMCNC: 33.4 G/DL (ref 31.5–35.7)
MCHC RBC AUTO-ENTMCNC: 33.4 G/DL (ref 32–36)
MCV RBC AUTO: 100.6 FL (ref 79–97)
MCV RBC AUTO: 95.2 FL (ref 80–99)
MCV RBC AUTO: 95.4 FL (ref 80–99)
MCV RBC AUTO: 96 FL (ref 79–97)
MCV RBC AUTO: 96.5 FL (ref 79–97)
MCV RBC AUTO: 96.6 FL (ref 79–97)
MCV RBC AUTO: 96.8 FL (ref 79–97)
MCV RBC AUTO: 96.9 FL (ref 79–97)
MCV RBC AUTO: 97 FL (ref 79–97)
MCV RBC AUTO: 97.6 FL (ref 79–97)
MCV RBC AUTO: 97.9 FL (ref 79–97)
MCV RBC AUTO: 98.1 FL (ref 79–97)
MCV RBC AUTO: 98.4 FL (ref 79–97)
MCV RBC AUTO: 98.8 FL (ref 79–97)
MONOCYTES # BLD AUTO: 0.2 10*3/MM3 (ref 0.1–0.9)
MONOCYTES # BLD AUTO: 0.3 10*3/MM3 (ref 0.1–0.9)
MONOCYTES # BLD AUTO: 0.5 10*3/MM3 (ref 0.1–0.9)
MONOCYTES # BLD AUTO: 0.5 10*3/MM3 (ref 0.1–0.9)
MONOCYTES # BLD AUTO: 0.6 10*3/MM3 (ref 0.1–0.9)
MONOCYTES # BLD AUTO: 0.7 10*3/MM3 (ref 0.1–0.9)
MONOCYTES # BLD AUTO: 0.7 10*3/MM3 (ref 0–1)
MONOCYTES # BLD AUTO: 0.7 10*3/MM3 (ref 0–1)
MONOCYTES # BLD AUTO: 1.1 10*3/MM3 (ref 0.1–0.9)
MONOCYTES NFR BLD AUTO: 10.2 % (ref 5–12)
MONOCYTES NFR BLD AUTO: 10.3 % (ref 5–12)
MONOCYTES NFR BLD AUTO: 11.7 % (ref 5–12)
MONOCYTES NFR BLD AUTO: 2.9 % (ref 5–12)
MONOCYTES NFR BLD AUTO: 4.1 % (ref 5–12)
MONOCYTES NFR BLD AUTO: 4.2 % (ref 5–12)
MONOCYTES NFR BLD AUTO: 5.5 % (ref 5–12)
MONOCYTES NFR BLD AUTO: 6.8 % (ref 5–12)
MONOCYTES NFR BLD AUTO: 6.9 % (ref 5–12)
MONOCYTES NFR BLD AUTO: 7 % (ref 0–12)
MONOCYTES NFR BLD AUTO: 8.2 % (ref 5–12)
MONOCYTES NFR BLD AUTO: 8.3 % (ref 5–12)
MONOCYTES NFR BLD AUTO: 8.5 % (ref 0–12)
MONOCYTES NFR BLD AUTO: 8.6 % (ref 5–12)
NEUTROPHILS # BLD AUTO: 0.1 10*3/MM3 (ref 1.7–7)
NEUTROPHILS # BLD AUTO: 0.5 10*3/MM3 (ref 1.7–7)
NEUTROPHILS # BLD AUTO: 0.8 10*3/MM3 (ref 1.7–7)
NEUTROPHILS # BLD AUTO: 0.8 10*3/MM3 (ref 1.7–7)
NEUTROPHILS # BLD AUTO: 1.2 10*3/MM3 (ref 1.7–7)
NEUTROPHILS # BLD AUTO: 10.1 10*3/MM3 (ref 1.7–7)
NEUTROPHILS # BLD AUTO: 11 10*3/MM3 (ref 1.7–7)
NEUTROPHILS # BLD AUTO: 14.3 10*3/MM3 (ref 1.7–7)
NEUTROPHILS # BLD AUTO: 2.4 10*3/MM3 (ref 1.7–7)
NEUTROPHILS # BLD AUTO: 2.5 10*3/MM3 (ref 1.7–7)
NEUTROPHILS # BLD AUTO: 3.9 10*3/MM3 (ref 1.5–8.3)
NEUTROPHILS # BLD AUTO: 4.5 10*3/MM3 (ref 1.7–7)
NEUTROPHILS # BLD AUTO: 5.6 10*3/MM3 (ref 1.5–8.3)
NEUTROPHILS # BLD AUTO: 9 10*3/MM3 (ref 1.7–7)
NEUTROPHILS NFR BLD AUTO: 24.6 % (ref 42.7–76)
NEUTROPHILS NFR BLD AUTO: 29.8 % (ref 42.7–76)
NEUTROPHILS NFR BLD AUTO: 3.3 % (ref 42.7–76)
NEUTROPHILS NFR BLD AUTO: 38.6 % (ref 42.7–76)
NEUTROPHILS NFR BLD AUTO: 39.3 % (ref 42.7–76)
NEUTROPHILS NFR BLD AUTO: 43.8 % (ref 42.7–76)
NEUTROPHILS NFR BLD AUTO: 50.5 % (ref 41–71)
NEUTROPHILS NFR BLD AUTO: 51.3 % (ref 42.7–76)
NEUTROPHILS NFR BLD AUTO: 59.4 % (ref 41–71)
NEUTROPHILS NFR BLD AUTO: 69.4 % (ref 42.7–76)
NEUTROPHILS NFR BLD AUTO: 75.5 % (ref 42.7–76)
NEUTROPHILS NFR BLD AUTO: 81.4 % (ref 42.7–76)
NEUTROPHILS NFR BLD AUTO: 82.3 % (ref 42.7–76)
NEUTROPHILS NFR BLD AUTO: 87.3 % (ref 42.7–76)
NITRITE UR-MCNC: NEGATIVE MG/ML
PATH REPORT.FINAL DX SPEC: NORMAL
PATH REPORT.GROSS SPEC: NORMAL
PH UR: 6 [PH] (ref 5–8)
PLATELET # BLD AUTO: 220 10*3/MM3 (ref 140–450)
PLATELET # BLD AUTO: 238 10*3/MM3 (ref 140–450)
PLATELET # BLD AUTO: 264 10*3/MM3 (ref 140–450)
PLATELET # BLD AUTO: 268 10*3/MM3 (ref 140–450)
PLATELET # BLD AUTO: 272 10*3/MM3 (ref 140–450)
PLATELET # BLD AUTO: 281 10*3/MM3 (ref 150–450)
PLATELET # BLD AUTO: 309 10*3/MM3 (ref 150–450)
PLATELET # BLD AUTO: 310 10*3/MM3 (ref 140–450)
PLATELET # BLD AUTO: 322 10*3/MM3 (ref 140–450)
PLATELET # BLD AUTO: 323 10*3/MM3 (ref 140–450)
PLATELET # BLD AUTO: 336 10*3/MM3 (ref 140–450)
PLATELET # BLD AUTO: 372 10*3/MM3 (ref 140–450)
PLATELET # BLD AUTO: 380 10*3/MM3 (ref 140–450)
PLATELET # BLD AUTO: 401 10*3/MM3 (ref 140–450)
PMV BLD AUTO: 6.1 FL (ref 6–12)
PMV BLD AUTO: 6.4 FL (ref 6–12)
PMV BLD AUTO: 6.4 FL (ref 6–12)
PMV BLD AUTO: 6.6 FL (ref 6–12)
PMV BLD AUTO: 6.7 FL (ref 6–12)
PMV BLD AUTO: 7 FL (ref 6–12)
PMV BLD AUTO: 7.1 FL (ref 6–12)
PMV BLD AUTO: 7.1 FL (ref 6–12)
PMV BLD AUTO: 7.2 FL (ref 6–12)
PMV BLD AUTO: 7.2 FL (ref 6–12)
PMV BLD AUTO: 7.4 FL (ref 6–12)
PMV BLD AUTO: 7.6 FL (ref 6–12)
POTASSIUM BLD-SCNC: 3.9 MMOL/L (ref 3.5–5.2)
POTASSIUM BLD-SCNC: 4.1 MMOL/L (ref 3.5–5.5)
POTASSIUM BLD-SCNC: 4.2 MMOL/L (ref 3.5–5.2)
POTASSIUM BLD-SCNC: 4.3 MMOL/L (ref 3.5–5.2)
POTASSIUM BLD-SCNC: 4.4 MMOL/L (ref 3.5–5.2)
POTASSIUM BLD-SCNC: 4.4 MMOL/L (ref 3.5–5.5)
POTASSIUM BLD-SCNC: 4.5 MMOL/L (ref 3.5–5.2)
POTASSIUM BLD-SCNC: 4.9 MMOL/L (ref 3.5–5.2)
POTASSIUM BLD-SCNC: 5.2 MMOL/L (ref 3.5–5.2)
PROT SERPL-MCNC: 5 G/DL (ref 6–8.5)
PROT SERPL-MCNC: 5.4 G/DL (ref 6–8.5)
PROT SERPL-MCNC: 6.2 G/DL (ref 5.7–8.2)
PROT SERPL-MCNC: 6.2 G/DL (ref 6–8.5)
PROT SERPL-MCNC: 6.3 G/DL (ref 5.7–8.2)
PROT SERPL-MCNC: 7 G/DL (ref 6–8.5)
PROT SERPL-MCNC: 7.4 G/DL (ref 6–8.5)
PROT UR STRIP-MCNC: ABNORMAL MG/DL
PSA SERPL-MCNC: 1.81 NG/ML (ref 0–4)
PSA SERPL-MCNC: 1.87 NG/ML (ref 0–4)
PSA SERPL-MCNC: 10.3 NG/ML (ref 0–4)
PSA SERPL-MCNC: 12.9 NG/ML (ref 0–4)
PSA SERPL-MCNC: 23.5 NG/ML (ref 0–4)
PSA SERPL-MCNC: 23.8 NG/ML (ref 0–4)
PSA SERPL-MCNC: 38.5 NG/ML (ref 0–4)
PSA SERPL-MCNC: 6.13 NG/ML (ref 0–4)
PSA SERPL-MCNC: 7.67 NG/ML (ref 0–4)
PSA SERPL-MCNC: 7.97 NG/ML (ref 0–4)
PSA SERPL-MCNC: 8.64 NG/ML (ref 0–4)
RBC # BLD AUTO: 2.7 10*6/MM3 (ref 4.14–5.8)
RBC # BLD AUTO: 2.75 10*6/MM3 (ref 4.14–5.8)
RBC # BLD AUTO: 2.78 10*6/MM3 (ref 4.14–5.8)
RBC # BLD AUTO: 2.79 10*6/MM3 (ref 4.14–5.8)
RBC # BLD AUTO: 2.89 10*6/MM3 (ref 4.14–5.8)
RBC # BLD AUTO: 2.9 10*6/MM3 (ref 4.14–5.8)
RBC # BLD AUTO: 2.9 10*6/MM3 (ref 4.14–5.8)
RBC # BLD AUTO: 2.91 10*6/MM3 (ref 4.14–5.8)
RBC # BLD AUTO: 2.94 10*6/MM3 (ref 4.14–5.8)
RBC # BLD AUTO: 2.96 10*6/MM3 (ref 4.14–5.8)
RBC # BLD AUTO: 3.04 10*6/MM3 (ref 4.14–5.8)
RBC # BLD AUTO: 3.34 10*6/MM3 (ref 4.14–5.8)
RBC # BLD AUTO: 3.58 10*6/MM3 (ref 4.2–5.76)
RBC # BLD AUTO: 3.62 10*6/MM3 (ref 4.2–5.76)
RBC # UR STRIP: ABNORMAL /UL
SODIUM BLD-SCNC: 133 MMOL/L (ref 136–145)
SODIUM BLD-SCNC: 134 MMOL/L (ref 136–145)
SODIUM BLD-SCNC: 135 MMOL/L (ref 136–145)
SODIUM BLD-SCNC: 138 MMOL/L (ref 136–145)
SODIUM BLD-SCNC: 139 MMOL/L (ref 132–146)
SODIUM BLD-SCNC: 139 MMOL/L (ref 132–146)
SODIUM BLD-SCNC: 139 MMOL/L (ref 136–145)
SP GR UR: 1.02 (ref 1–1.03)
UROBILINOGEN UR QL: NORMAL
WBC NRBC COR # BLD: 11.1 10*3/MM3 (ref 3.4–10.8)
WBC NRBC COR # BLD: 11.6 10*3/MM3 (ref 3.4–10.8)
WBC NRBC COR # BLD: 15.8 10*3/MM3 (ref 3.4–10.8)
WBC NRBC COR # BLD: 17.4 10*3/MM3 (ref 3.4–10.8)
WBC NRBC COR # BLD: 2 10*3/MM3 (ref 3.4–10.8)
WBC NRBC COR # BLD: 2.1 10*3/MM3 (ref 3.4–10.8)
WBC NRBC COR # BLD: 2.3 10*3/MM3 (ref 3.4–10.8)
WBC NRBC COR # BLD: 2.8 10*3/MM3 (ref 3.4–10.8)
WBC NRBC COR # BLD: 3.1 10*3/MM3 (ref 3.4–10.8)
WBC NRBC COR # BLD: 4.7 10*3/MM3 (ref 3.4–10.8)
WBC NRBC COR # BLD: 5.6 10*3/MM3 (ref 3.4–10.8)
WBC NRBC COR # BLD: 6 10*3/MM3 (ref 3.4–10.8)
WBC NRBC COR # BLD: 7.7 10*3/MM3 (ref 3.5–10.8)
WBC NRBC COR # BLD: 9.5 10*3/MM3 (ref 3.5–10.8)

## 2019-01-01 PROCEDURE — 85025 COMPLETE CBC W/AUTO DIFF WBC: CPT

## 2019-01-01 PROCEDURE — 36415 COLL VENOUS BLD VENIPUNCTURE: CPT | Performed by: INTERNAL MEDICINE

## 2019-01-01 PROCEDURE — 96360 HYDRATION IV INFUSION INIT: CPT

## 2019-01-01 PROCEDURE — 99214 OFFICE O/P EST MOD 30 MIN: CPT | Performed by: INTERNAL MEDICINE

## 2019-01-01 PROCEDURE — 25010000002 DEXAMETHASONE PER 1 MG: Performed by: INTERNAL MEDICINE

## 2019-01-01 PROCEDURE — 96374 THER/PROPH/DIAG INJ IV PUSH: CPT

## 2019-01-01 PROCEDURE — 85025 COMPLETE CBC W/AUTO DIFF WBC: CPT | Performed by: INTERNAL MEDICINE

## 2019-01-01 PROCEDURE — 80053 COMPREHEN METABOLIC PANEL: CPT

## 2019-01-01 PROCEDURE — 25010000002 CABAZITAXEL 10 MG/1ML SOLUTION 60 MG VIAL: Performed by: INTERNAL MEDICINE

## 2019-01-01 PROCEDURE — 99214 OFFICE O/P EST MOD 30 MIN: CPT | Performed by: NURSE PRACTITIONER

## 2019-01-01 PROCEDURE — 25010000002 DIPHENHYDRAMINE PER 50 MG: Performed by: INTERNAL MEDICINE

## 2019-01-01 PROCEDURE — 80053 COMPREHEN METABOLIC PANEL: CPT | Performed by: INTERNAL MEDICINE

## 2019-01-01 PROCEDURE — 36415 COLL VENOUS BLD VENIPUNCTURE: CPT

## 2019-01-01 PROCEDURE — 82565 ASSAY OF CREATININE: CPT

## 2019-01-01 PROCEDURE — 96413 CHEMO IV INFUSION 1 HR: CPT

## 2019-01-01 PROCEDURE — 84153 ASSAY OF PSA TOTAL: CPT | Performed by: INTERNAL MEDICINE

## 2019-01-01 PROCEDURE — 25010000002 DOCETAXEL 20 MG/ML SOLUTION 1 ML VIAL: Performed by: INTERNAL MEDICINE

## 2019-01-01 PROCEDURE — 96375 TX/PRO/DX INJ NEW DRUG ADDON: CPT

## 2019-01-01 PROCEDURE — 0 TECHNETIUM MEDRONATE KIT: Performed by: INTERNAL MEDICINE

## 2019-01-01 PROCEDURE — 25010000002 IOPAMIDOL 61 % SOLUTION: Performed by: INTERNAL MEDICINE

## 2019-01-01 PROCEDURE — 25010000002 DOCETAXEL 20 MG/ML SOLUTION 4 ML VIAL: Performed by: INTERNAL MEDICINE

## 2019-01-01 PROCEDURE — 74177 CT ABD & PELVIS W/CONTRAST: CPT

## 2019-01-01 PROCEDURE — 84153 ASSAY OF PSA TOTAL: CPT

## 2019-01-01 PROCEDURE — 88305 TISSUE EXAM BY PATHOLOGIST: CPT | Performed by: INTERNAL MEDICINE

## 2019-01-01 PROCEDURE — 74176 CT ABD & PELVIS W/O CONTRAST: CPT

## 2019-01-01 PROCEDURE — 96361 HYDRATE IV INFUSION ADD-ON: CPT

## 2019-01-01 PROCEDURE — A9503 TC99M MEDRONATE: HCPCS | Performed by: INTERNAL MEDICINE

## 2019-01-01 PROCEDURE — 71250 CT THORAX DX C-: CPT

## 2019-01-01 PROCEDURE — 78306 BONE IMAGING WHOLE BODY: CPT

## 2019-01-01 PROCEDURE — 81003 URINALYSIS AUTO W/O SCOPE: CPT | Performed by: NURSE PRACTITIONER

## 2019-01-01 PROCEDURE — 43239 EGD BIOPSY SINGLE/MULTIPLE: CPT | Performed by: INTERNAL MEDICINE

## 2019-01-01 PROCEDURE — G0463 HOSPITAL OUTPT CLINIC VISIT: HCPCS

## 2019-01-01 PROCEDURE — 99213 OFFICE O/P EST LOW 20 MIN: CPT | Performed by: INTERNAL MEDICINE

## 2019-01-01 RX ORDER — ONDANSETRON HYDROCHLORIDE 8 MG/1
8 TABLET, FILM COATED ORAL 3 TIMES DAILY PRN
Qty: 30 TABLET | Refills: 5 | Status: SHIPPED | OUTPATIENT
Start: 2019-01-01 | End: 2019-01-01

## 2019-01-01 RX ORDER — OMEPRAZOLE 40 MG/1
CAPSULE, DELAYED RELEASE ORAL
COMMUNITY
End: 2019-01-01 | Stop reason: SDUPTHER

## 2019-01-01 RX ORDER — DICYCLOMINE HYDROCHLORIDE 10 MG/1
10 CAPSULE ORAL
Qty: 120 CAPSULE | Refills: 3 | Status: SHIPPED | OUTPATIENT
Start: 2019-01-01

## 2019-01-01 RX ORDER — SODIUM CHLORIDE 9 MG/ML
250 INJECTION, SOLUTION INTRAVENOUS ONCE
Status: DISCONTINUED | OUTPATIENT
Start: 2019-01-01 | End: 2019-01-01 | Stop reason: HOSPADM

## 2019-01-01 RX ORDER — TAMSULOSIN HYDROCHLORIDE 0.4 MG/1
1 CAPSULE ORAL NIGHTLY
Qty: 90 CAPSULE | Refills: 3 | Status: SHIPPED | OUTPATIENT
Start: 2019-01-01

## 2019-01-01 RX ORDER — MEGESTROL ACETATE 40 MG/ML
400 SUSPENSION ORAL 2 TIMES DAILY
Qty: 480 ML | Refills: 4
Start: 2019-01-01

## 2019-01-01 RX ORDER — TAMSULOSIN HYDROCHLORIDE 0.4 MG/1
CAPSULE ORAL
COMMUNITY
End: 2019-01-01 | Stop reason: SDUPTHER

## 2019-01-01 RX ORDER — SODIUM CHLORIDE 9 MG/ML
250 INJECTION, SOLUTION INTRAVENOUS ONCE
Status: COMPLETED | OUTPATIENT
Start: 2019-01-01 | End: 2019-01-01

## 2019-01-01 RX ORDER — CYCLOBENZAPRINE HCL 10 MG
10 TABLET ORAL 3 TIMES DAILY PRN
Qty: 60 TABLET | Refills: 0 | Status: SHIPPED | OUTPATIENT
Start: 2019-01-01 | End: 2019-01-01

## 2019-01-01 RX ORDER — DIPHENHYDRAMINE HYDROCHLORIDE 50 MG/ML
50 INJECTION INTRAMUSCULAR; INTRAVENOUS AS NEEDED
Status: CANCELLED | OUTPATIENT
Start: 2019-01-01

## 2019-01-01 RX ORDER — METHYLPHENIDATE HYDROCHLORIDE 10 MG/1
10 TABLET ORAL TAKE AS DIRECTED
Qty: 60 TABLET | Refills: 0 | Status: SHIPPED | OUTPATIENT
Start: 2019-01-01

## 2019-01-01 RX ORDER — SODIUM CHLORIDE 9 MG/ML
250 INJECTION, SOLUTION INTRAVENOUS ONCE
Status: CANCELLED | OUTPATIENT
Start: 2019-01-01

## 2019-01-01 RX ORDER — FAMOTIDINE 10 MG/ML
20 INJECTION, SOLUTION INTRAVENOUS ONCE
Status: CANCELLED | OUTPATIENT
Start: 2019-01-01

## 2019-01-01 RX ORDER — OXYCODONE HYDROCHLORIDE 10 MG/1
10 TABLET ORAL EVERY 6 HOURS PRN
Qty: 90 TABLET | Refills: 0 | Status: SHIPPED | OUTPATIENT
Start: 2019-01-01 | End: 2019-01-01

## 2019-01-01 RX ORDER — OMEPRAZOLE 40 MG/1
40 CAPSULE, DELAYED RELEASE ORAL DAILY
Qty: 90 CAPSULE | Refills: 1 | OUTPATIENT
Start: 2019-01-01 | End: 2019-01-01 | Stop reason: SDUPTHER

## 2019-01-01 RX ORDER — FAMOTIDINE 10 MG/ML
20 INJECTION, SOLUTION INTRAVENOUS AS NEEDED
Status: CANCELLED | OUTPATIENT
Start: 2019-01-01

## 2019-01-01 RX ORDER — OXYCODONE HYDROCHLORIDE 10 MG/1
10 TABLET ORAL EVERY 6 HOURS PRN
Qty: 100 TABLET | Refills: 0 | Status: SHIPPED | OUTPATIENT
Start: 2019-01-01

## 2019-01-01 RX ORDER — HYDROCODONE BITARTRATE AND ACETAMINOPHEN 5; 325 MG/1; MG/1
TABLET ORAL
COMMUNITY
Start: 2019-01-01 | End: 2019-01-01

## 2019-01-01 RX ORDER — NITROFURANTOIN 25; 75 MG/1; MG/1
100 CAPSULE ORAL 2 TIMES DAILY
Qty: 10 CAPSULE | Refills: 0 | Status: SHIPPED | OUTPATIENT
Start: 2019-01-01 | End: 2019-01-01

## 2019-01-01 RX ORDER — PANTOPRAZOLE SODIUM 40 MG/1
40 TABLET, DELAYED RELEASE ORAL DAILY
Qty: 30 TABLET | Refills: 5 | Status: SHIPPED | OUTPATIENT
Start: 2019-01-01 | End: 2019-01-01

## 2019-01-01 RX ORDER — DEXLANSOPRAZOLE 60 MG/1
60 CAPSULE, DELAYED RELEASE ORAL DAILY
Qty: 30 CAPSULE | Refills: 2 | Status: SHIPPED | OUTPATIENT
Start: 2019-01-01 | End: 2019-01-01

## 2019-01-01 RX ORDER — FAMOTIDINE 10 MG/ML
20 INJECTION, SOLUTION INTRAVENOUS ONCE
Status: COMPLETED | OUTPATIENT
Start: 2019-01-01 | End: 2019-01-01

## 2019-01-01 RX ORDER — HYDROCODONE BITARTRATE AND ACETAMINOPHEN 10; 325 MG/1; MG/1
TABLET ORAL EVERY 4 HOURS
COMMUNITY
End: 2019-01-01 | Stop reason: SDUPTHER

## 2019-01-01 RX ORDER — DICYCLOMINE HCL 20 MG
20 TABLET ORAL EVERY 6 HOURS
Qty: 120 TABLET | Refills: 1 | Status: SHIPPED | OUTPATIENT
Start: 2019-01-01 | End: 2019-01-01

## 2019-01-01 RX ORDER — SUCRALFATE 1 G/1
1 TABLET ORAL 4 TIMES DAILY
Qty: 120 TABLET | Refills: 2 | Status: SHIPPED | OUTPATIENT
Start: 2019-01-01

## 2019-01-01 RX ORDER — DRONABINOL 5 MG/1
5 CAPSULE ORAL
Qty: 60 CAPSULE | Refills: 3 | Status: SHIPPED | OUTPATIENT
Start: 2019-01-01 | End: 2019-01-01

## 2019-01-01 RX ORDER — HYDROCODONE BITARTRATE AND ACETAMINOPHEN 10; 325 MG/1; MG/1
1 TABLET ORAL EVERY 6 HOURS PRN
Qty: 120 TABLET | Refills: 0 | Status: SHIPPED | OUTPATIENT
Start: 2019-01-01

## 2019-01-01 RX ORDER — DEXAMETHASONE 4 MG/1
TABLET ORAL
Qty: 12 TABLET | Refills: 5 | Status: SHIPPED | OUTPATIENT
Start: 2019-01-01 | End: 2019-01-01

## 2019-01-01 RX ORDER — OMEPRAZOLE 40 MG/1
40 CAPSULE, DELAYED RELEASE ORAL DAILY
Qty: 90 CAPSULE | Refills: 1 | OUTPATIENT
Start: 2019-01-01 | End: 2019-01-01

## 2019-01-01 RX ORDER — PREDNISONE 10 MG/1
10 TABLET ORAL DAILY
Qty: 30 TABLET | Refills: 11 | Status: SHIPPED | OUTPATIENT
Start: 2019-01-01 | End: 2019-01-01

## 2019-01-01 RX ORDER — TC 99M MEDRONATE 20 MG/10ML
26.7 INJECTION, POWDER, LYOPHILIZED, FOR SOLUTION INTRAVENOUS
Status: COMPLETED | OUTPATIENT
Start: 2019-01-01 | End: 2019-01-01

## 2019-01-01 RX ORDER — POLYETHYLENE GLYCOL 3350 17 G/17G
POWDER, FOR SOLUTION ORAL
COMMUNITY

## 2019-01-01 RX ORDER — OMEPRAZOLE 40 MG/1
CAPSULE, DELAYED RELEASE ORAL
Qty: 30 CAPSULE | Refills: 2 | OUTPATIENT
Start: 2019-01-01

## 2019-01-01 RX ORDER — ONDANSETRON 4 MG/1
4 TABLET, FILM COATED ORAL EVERY 8 HOURS PRN
Qty: 30 TABLET | Refills: 5 | Status: SHIPPED | OUTPATIENT
Start: 2019-01-01 | End: 2019-01-01

## 2019-01-01 RX ADMIN — SODIUM CHLORIDE 1000 ML: 9 INJECTION, SOLUTION INTRAVENOUS at 10:20

## 2019-01-01 RX ADMIN — DIPHENHYDRAMINE HYDROCHLORIDE 25 MG: 50 INJECTION INTRAMUSCULAR; INTRAVENOUS at 10:07

## 2019-01-01 RX ADMIN — DOCETAXEL 90 MG: 20 INJECTION, SOLUTION, CONCENTRATE INTRAVENOUS at 09:32

## 2019-01-01 RX ADMIN — BARIUM SULFATE 450 ML: 21 SUSPENSION ORAL at 07:45

## 2019-01-01 RX ADMIN — SODIUM CHLORIDE 1000 ML: 9 INJECTION, SOLUTION INTRAVENOUS at 11:00

## 2019-01-01 RX ADMIN — SODIUM CHLORIDE 250 ML: 9 INJECTION, SOLUTION INTRAVENOUS at 09:32

## 2019-01-01 RX ADMIN — DOCETAXEL 90 MG: 20 INJECTION, SOLUTION, CONCENTRATE INTRAVENOUS at 09:27

## 2019-01-01 RX ADMIN — SODIUM CHLORIDE 36 MG: 9 INJECTION, SOLUTION INTRAVENOUS at 09:39

## 2019-01-01 RX ADMIN — FAMOTIDINE 20 MG: 10 INJECTION, SOLUTION INTRAVENOUS at 09:57

## 2019-01-01 RX ADMIN — BARIUM SULFATE 450 ML: 21 SUSPENSION ORAL at 08:05

## 2019-01-01 RX ADMIN — DOCETAXEL 90 MG: 20 INJECTION, SOLUTION, CONCENTRATE INTRAVENOUS at 10:06

## 2019-01-01 RX ADMIN — DEXAMETHASONE SODIUM PHOSPHATE 12 MG: 4 INJECTION, SOLUTION INTRA-ARTICULAR; INTRALESIONAL; INTRAMUSCULAR; INTRAVENOUS; SOFT TISSUE at 09:58

## 2019-01-01 RX ADMIN — SODIUM CHLORIDE 250 ML: 9 INJECTION, SOLUTION INTRAVENOUS at 10:04

## 2019-01-01 RX ADMIN — SODIUM CHLORIDE 36 MG: 9 INJECTION, SOLUTION INTRAVENOUS at 10:30

## 2019-01-01 RX ADMIN — DEXAMETHASONE SODIUM PHOSPHATE 12 MG: 4 INJECTION, SOLUTION INTRAMUSCULAR; INTRAVENOUS at 10:06

## 2019-01-01 RX ADMIN — IOPAMIDOL 70 ML: 612 INJECTION, SOLUTION INTRAVENOUS at 09:32

## 2019-01-01 RX ADMIN — IOPAMIDOL 80 ML: 612 INJECTION, SOLUTION INTRAVENOUS at 09:10

## 2019-01-01 RX ADMIN — FAMOTIDINE 20 MG: 10 INJECTION, SOLUTION INTRAVENOUS at 09:10

## 2019-01-01 RX ADMIN — IOPAMIDOL 85 ML: 612 INJECTION, SOLUTION INTRAVENOUS at 10:00

## 2019-01-01 RX ADMIN — Medication 26.7 MILLICURIE: at 08:05

## 2019-01-01 RX ADMIN — BARIUM SULFATE 450 ML: 21 SUSPENSION ORAL at 08:00

## 2019-01-01 RX ADMIN — DIPHENHYDRAMINE HYDROCHLORIDE 25 MG: 50 INJECTION INTRAMUSCULAR; INTRAVENOUS at 09:58

## 2019-01-01 RX ADMIN — FAMOTIDINE 20 MG: 10 INJECTION, SOLUTION INTRAVENOUS at 10:04

## 2019-01-01 RX ADMIN — SODIUM CHLORIDE 36 MG: 9 INJECTION, SOLUTION INTRAVENOUS at 10:42

## 2019-01-01 RX ADMIN — DIPHENHYDRAMINE HYDROCHLORIDE 25 MG: 50 INJECTION INTRAMUSCULAR; INTRAVENOUS at 09:10

## 2019-01-01 RX ADMIN — SODIUM CHLORIDE 250 ML: 9 INJECTION, SOLUTION INTRAVENOUS at 09:27

## 2019-01-01 RX ADMIN — DEXAMETHASONE SODIUM PHOSPHATE 12 MG: 4 INJECTION, SOLUTION INTRAMUSCULAR; INTRAVENOUS at 09:10

## 2019-01-01 RX ADMIN — BARIUM SULFATE 450 ML: 21 SUSPENSION ORAL at 09:00

## 2019-01-04 NOTE — PROGRESS NOTES
"     Malignant tumor of prostate (CMS/HCC)    12/13/2017 Initial Diagnosis     Prostate cancer metastatic to intrapelvic lymph node        - 1/30/2018 Radiation     Radiation OncologyTreatment Course:  Sadi Stone Jr. received 45 cGy in 18 fractions with Dr. Rosas at [facility]         1/30/2018 -  Hormonal Therapy     Received Eliguard every 6 months         5/7/2018 - 8/27/2018 Chemotherapy     Taxotere X 6 cycles         10/15/2018 -  Chemotherapy     OP PROSTATE Abiraterone / PredniSONE              REASON FOR VISIT: Metastatic Prostate Cancer    HISTORY OF PRESENT ILLNESS:   63 y.o.  male presents today for Follow-up of his locally advanced prostate cancer.  He completed 6 cycles of Taxotere in aug 2018. Currently on Zytiga with prednisone after seeing retroperitoneal adenopathy worsen.  He is tolerating it reasonably well.  Denies any headaches or vision changes.  No cough.  Sleep issues still ongoing.  Leg continues to be swollen.    Past medical history, social history and family history was reviewed and unchanged from prior visit.    Review of Systems:    Review of Systems   Constitutional: Positive for fatigue.   HENT:  Negative.    Eyes: Negative.    Respiratory: Negative.    Cardiovascular: Positive for leg swelling.   Gastrointestinal: Negative.    Endocrine: Negative.    Genitourinary: Negative.     Musculoskeletal: Negative.    Skin: Negative.    Neurological: Negative.    Hematological: Negative.    Psychiatric/Behavioral: Positive for sleep disturbance.      A comprehensive 14 point review of systems was performed and was negative except as mentioned.      Medications:  The current medication list was reviewed in the EMR    ALLERGIES:    Allergies   Allergen Reactions   • Bactrim [Sulfamethoxazole-Trimethoprim] Anaphylaxis         Physical Exam    VITAL SIGNS:  /82 Comment: LUE  Pulse 98   Temp 97.4 °F (36.3 °C) (Temporal)   Resp 18   Ht 172.7 cm (68\")   Wt 76.2 kg (168 " lb)   SpO2 98% Comment: RA  BMI 25.54 kg/m²     Wt Readings from Last 3 Encounters:   01/04/19 76.2 kg (168 lb)   12/20/18 77.3 kg (170 lb 6.4 oz)   12/17/18 74.4 kg (164 lb)        Performance Status: 0    General: well appearing, in no acute distress  HEENT: sclera anicteric, oropharynx clear, neck is supple  Lymphatics: no cervical, supraclavicular, or axillary adenopathy  Cardiovascular: regular rate and rhythm, no murmurs, rubs or gallops  Lungs: clear to auscultation bilaterally  Abdomen: soft, nontender, nondistended.  No palpable organomegaly  Extremities: + Left  lower extremity edema  Skin: no rashes, lesions, bruising, or petechiae  Msk:  Shows no weakness of the large muscle groups  Psych: Mood is stable    Lab Results   Component Value Date    HGB 11.5 (L) 01/04/2019    HCT 34.5 (L) 01/04/2019    MCV 95.4 01/04/2019     01/04/2019    WBC 9.50 01/04/2019    NEUTROABS 5.60 01/04/2019    LYMPHSABS 3.20 01/04/2019    MONOSABS 0.70 01/04/2019    EOSABS 0.04 10/12/2018    BASOSABS 0.03 10/12/2018     Lab Results   Component Value Date    GLUCOSE 124 (H) 12/17/2018    BUN 28 (H) 12/17/2018    CREATININE 1.33 (H) 12/17/2018     12/17/2018    K 4.4 12/17/2018     12/17/2018    CO2 30.0 12/17/2018    CALCIUM 9.2 12/17/2018    PROTEINTOT 6.5 12/17/2018    ALBUMIN 3.83 12/17/2018    BILITOT 0.8 12/17/2018    ALKPHOS 123 (H) 12/17/2018    AST 23 12/17/2018    ALT 21 12/17/2018       Lab Results   Component Value Date    PSA 1.770 12/17/2018    PSA 0.700 10/12/2018    PSA 0.370 08/27/2018    PSA 0.310 08/06/2018    PSA 0.300 07/16/2018       Ct Chest With Contrast    Result Date: 10/12/2018  1. No clear evidence of metastatic disease in the chest. 2. Progression of retroperitoneal adenopathy. 3. L1 mixed metastasis.  D:  10/12/2018 E:  10/12/2018  This report was finalized on 10/12/2018 4:07 PM by Clay Freeman.      Nm Bone Scan Whole Body    Result Date: 10/12/2018  Stable uptake of tracer seen  within the L1 vertebral body. There is no evidence of new tracer uptake identified. The examination is stable.  D:  10/12/2018 E:  10/12/2018    This report was finalized on 10/12/2018 4:54 PM by Dr. Indy Whitlock MD.      Ct Abdomen Pelvis With Contrast    Result Date: 10/12/2018  1. No clear evidence of metastatic disease in the chest. 2. Progression of retroperitoneal adenopathy. 3. L1 mixed metastasis.  D:  10/12/2018 E:  10/12/2018  This report was finalized on 10/12/2018 4:07 PM by Clay Freeman.            Assessment/Plan    1.  Locally advanced metastatic prostate cancer: Continue Eligard .  He is currently on Zytiga and prednisone.  Plan for CAT scan in early February.  I will repeat labs at that time.    2.  Insomnia: on restoril    3.  Left lower extremity edema secondary to lymphadenopathy - continue supportive care for now.     I spent 25  minutes on the patient's plan and care with over 50% spent counseling the patient      Jolynn Dao MD  [facility] Hematology and Oncology    1/4/2019     Return on: 02/20/19  Return in (Approximately): 1 month    Orders Placed This Encounter   Procedures   • CT Abdomen Pelvis With Contrast     Standing Status:   Future     Standing Expiration Date:   1/4/2020     Order Specific Question:   Will Oral Contrast be needed for this procedure?     Answer:   Yes   • PSA Diagnostic     Standing Status:   Future     Number of Occurrences:   1     Standing Expiration Date:   1/4/2020   • Comprehensive Metabolic Panel     Standing Status:   Future     Number of Occurrences:   1     Standing Expiration Date:   1/4/2020   • CBC & Differential     Standing Status:   Future     Number of Occurrences:   1     Standing Expiration Date:   1/4/2020     Order Specific Question:   Manual Differential     Answer:   No         Please note that portions of this note may have been completed with a voice recognition program. Efforts were made to edit the dictations, but  occasionally words are mistranscribed.

## 2019-01-16 NOTE — PROGRESS NOTES
Malignant tumor of prostate (CMS/HCC)    12/13/2017 Initial Diagnosis     Prostate cancer metastatic to intrapelvic lymph node        - 1/30/2018 Radiation     Radiation OncologyTreatment Course:  Sadi Stone JrTara [facility]         1/30/2018 -  Hormonal Therapy     Received Eliguard every 6 months         5/7/2018 - 8/27/2018 Chemotherapy     Taxotere X 6 cycles         10/15/2018 -  Chemotherapy     OP PROSTATE Abiraterone / PredniSONE              REASON FOR VISIT: Metastatic Prostate Cancer    HISTORY OF PRESENT ILLNESS:   63 y.o.  male presents today for Follow-up of his locally advanced prostate cancer.  He completed 6 cycles of Taxotere in aug 2018. Currently on Zytiga with prednisone after seeing retroperitoneal adenopathy worsen.  He complains of Abdominal pain  That has started 1 week ago.  He still has lower extremity edema due to lymphadenopathy.  Denies any night sweats or fevers.  No recent infections.      Past medical history, social history and family history was reviewed and unchanged from prior visit.    Review of Systems:    Review of Systems   Constitutional: Positive for fatigue.   HENT:  Negative.    Eyes: Negative.    Respiratory: Negative.    Cardiovascular: Positive for leg swelling.   Gastrointestinal: Positive for abdominal pain.   Endocrine: Negative.    Genitourinary: Negative.     Musculoskeletal: Negative.    Skin: Negative.    Neurological: Negative.    Hematological: Negative.    Psychiatric/Behavioral: Positive for sleep disturbance.      A comprehensive 14 point review of systems was performed and was negative except as mentioned.      Medications:  The current medication list was reviewed in the EMR    ALLERGIES:    Allergies   Allergen Reactions   • Bactrim [Sulfamethoxazole-Trimethoprim] Anaphylaxis         Physical Exam    VITAL SIGNS:  /75   Pulse 94   Temp 98 °F (36.7 °C)   Resp 18   Ht 172.7 cm  "(68\")   Wt 76.7 kg (169 lb)   SpO2 98%   BMI 25.70 kg/m²     Wt Readings from Last 3 Encounters:   01/16/19 76.7 kg (169 lb)   01/04/19 76.2 kg (168 lb)   12/20/18 77.3 kg (170 lb 6.4 oz)        Performance Status: 0    General: well appearing, in no acute distress  HEENT: sclera anicteric, oropharynx clear, neck is supple  Lymphatics: no cervical, supraclavicular, or axillary adenopathy  Cardiovascular: regular rate and rhythm, no murmurs, rubs or gallops  Lungs: clear to auscultation bilaterally  Abdomen: soft, nontender, nondistended.  No palpable organomegaly  Extremities: + Left  lower extremity edema  Skin: no rashes, lesions, bruising, or petechiae  Msk:  Shows no weakness of the large muscle groups  Psych: Mood is stable    Lab Results   Component Value Date    HGB 11.5 (L) 01/04/2019    HCT 34.5 (L) 01/04/2019    MCV 95.4 01/04/2019     01/04/2019    WBC 9.50 01/04/2019    NEUTROABS 5.60 01/04/2019    LYMPHSABS 3.20 01/04/2019    MONOSABS 0.70 01/04/2019    EOSABS 0.04 10/12/2018    BASOSABS 0.03 10/12/2018     Lab Results   Component Value Date    GLUCOSE 119 (H) 01/04/2019    BUN 28 (H) 01/04/2019    CREATININE 1.25 01/04/2019     01/04/2019    K 4.1 01/04/2019     01/04/2019    CO2 29.0 01/04/2019    CALCIUM 9.0 01/04/2019    PROTEINTOT 6.3 01/04/2019    ALBUMIN 3.62 01/04/2019    BILITOT 1.2 01/04/2019    ALKPHOS 130 (H) 01/04/2019    AST 28 01/04/2019    ALT 20 01/04/2019       Lab Results   Component Value Date    PSA 1.870 01/04/2019    PSA 1.770 12/17/2018    PSA 0.700 10/12/2018    PSA 0.370 08/27/2018    PSA 0.310 08/06/2018       Ct Chest With Contrast    Result Date: 10/12/2018  1. No clear evidence of metastatic disease in the chest. 2. Progression of retroperitoneal adenopathy. 3. L1 mixed metastasis.  D:  10/12/2018 E:  10/12/2018  This report was finalized on 10/12/2018 4:07 PM by Clay Freeman.      Nm Bone Scan Whole Body    Result Date: 10/12/2018  Stable uptake of " tracer seen within the L1 vertebral body. There is no evidence of new tracer uptake identified. The examination is stable.  D:  10/12/2018 E:  10/12/2018    This report was finalized on 10/12/2018 4:54 PM by Dr. Indy Whitlock MD.      Ct Abdomen Pelvis With Contrast    Result Date: 10/12/2018  1. No clear evidence of metastatic disease in the chest. 2. Progression of retroperitoneal adenopathy. 3. L1 mixed metastasis.  D:  10/12/2018 E:  10/12/2018  This report was finalized on 10/12/2018 4:07 PM by Clay Freeman.            Assessment/Plan    1.  Locally advanced metastatic prostate cancer: Continue Eligard .  He is currently on Zytiga and prednisone.  I'm concerned about his abdominal discomfort.  I'm going to [facility] scans up sooner in the next week or so and see him after that.  I suspect he may have progression.  If he does then plan for switched to Xtandi.    2.  Insomnia: on restoril    3.  Left lower extremity edema secondary to lymphadenopathy - continue supportive care for now.     I spent 15  minutes on the patient's plan and care with over 50% spent counseling the patient      Jolynn Dao MD  [facility] Hematology and Oncology    1/16/2019          No orders of the defined types were placed in this encounter.        Please note that portions of this note may have been completed with a voice recognition program. Efforts were made to edit the dictations, but occasionally words are mistranscribed.

## 2019-01-19 NOTE — PROGRESS NOTES
Malignant tumor of prostate (CMS/HCC)    12/13/2017 Initial Diagnosis     Prostate cancer metastatic to intrapelvic lymph node        - 1/30/2018 Radiation     Radiation OncologyTreatment Course:  Sadi Stone JrTara received 45 cGy in 18 fractions with Dr. Rosas at [facility]         1/30/2018 -  Hormonal Therapy     Received Eliguard every 6 months         5/7/2018 - 8/27/2018 Chemotherapy     Taxotere X 6 cycles         10/15/2018 -  Chemotherapy     OP PROSTATE Abiraterone / PredniSONE              REASON FOR VISIT: Metastatic Prostate Cancer    HISTORY OF PRESENT ILLNESS:   63 y.o.  male presents today for Follow-up of his locally advanced prostate cancer.  He completed 6 cycles of Taxotere in aug 2018. Currently on Zytiga with prednisone after seeing retroperitoneal adenopathy worsen.  He continues to have some issues with abdominal discomfort.  He presents after having CAT scans done.  Still has lower extremity edema due to lymphadenopathy in his pelvis.        Past medical history, social history and family history was reviewed and unchanged from prior visit.    Review of Systems:    Review of Systems   Constitutional: Positive for fatigue.   HENT:  Negative.    Eyes: Negative.    Respiratory: Negative.    Cardiovascular: Positive for leg swelling.   Gastrointestinal: Positive for abdominal pain.   Endocrine: Negative.    Genitourinary: Negative.     Musculoskeletal: Negative.    Skin: Negative.    Neurological: Negative.    Hematological: Negative.    Psychiatric/Behavioral: Positive for sleep disturbance.      A comprehensive 14 point review of systems was performed and was negative except as mentioned.      Medications:  The current medication list was reviewed in the EMR    ALLERGIES:    Allergies   Allergen Reactions   • Bactrim [Sulfamethoxazole-Trimethoprim] Anaphylaxis         Physical Exam    VITAL SIGNS:  /94 Comment: LUE  Pulse 61   Temp 97.9 °F (36.6 °C) (Temporal)    "Resp 18   Ht 172.7 cm (68\")   Wt 76.7 kg (169 lb)   SpO2 98% Comment: RA  BMI 25.70 kg/m²     Wt Readings from Last 3 Encounters:   01/18/19 76.7 kg (169 lb)   01/16/19 76.7 kg (169 lb)   01/04/19 76.2 kg (168 lb)        Performance Status: 0    General: well appearing, in no acute distress  HEENT: sclera anicteric, oropharynx clear, neck is supple  Lymphatics: no cervical, supraclavicular, or axillary adenopathy  Cardiovascular: regular rate and rhythm, no murmurs, rubs or gallops  Lungs: clear to auscultation bilaterally  Abdomen: soft, nontender, nondistended.  No palpable organomegaly  Extremities: + Left  lower extremity edema  Skin: no rashes, lesions, bruising, or petechiae  Msk:  Shows no weakness of the large muscle groups  Psych: Mood is stable    Lab Results   Component Value Date    HGB 11.5 (L) 01/04/2019    HCT 34.5 (L) 01/04/2019    MCV 95.4 01/04/2019     01/04/2019    WBC 9.50 01/04/2019    NEUTROABS 5.60 01/04/2019    LYMPHSABS 3.20 01/04/2019    MONOSABS 0.70 01/04/2019    EOSABS 0.04 10/12/2018    BASOSABS 0.03 10/12/2018     Lab Results   Component Value Date    GLUCOSE 119 (H) 01/04/2019    BUN 28 (H) 01/04/2019    CREATININE 1.25 01/04/2019     01/04/2019    K 4.1 01/04/2019     01/04/2019    CO2 29.0 01/04/2019    CALCIUM 9.0 01/04/2019    PROTEINTOT 6.3 01/04/2019    ALBUMIN 3.62 01/04/2019    BILITOT 1.2 01/04/2019    ALKPHOS 130 (H) 01/04/2019    AST 28 01/04/2019    ALT 20 01/04/2019       Lab Results   Component Value Date    PSA 1.870 01/04/2019    PSA 1.770 12/17/2018    PSA 0.700 10/12/2018    PSA 0.370 08/27/2018    PSA 0.310 08/06/2018       Ct Chest With Contrast    Result Date: 10/12/2018  1. No clear evidence of metastatic disease in the chest. 2. Progression of retroperitoneal adenopathy. 3. L1 mixed metastasis.  D:  10/12/2018 E:  10/12/2018  This report was finalized on 10/12/2018 4:07 PM by Clay Freeman.      Nm Bone Scan Whole Body    Result Date: " 10/12/2018  Stable uptake of tracer seen within the L1 vertebral body. There is no evidence of new tracer uptake identified. The examination is stable.  D:  10/12/2018 E:  10/12/2018    This report was finalized on 10/12/2018 4:54 PM by Dr. Indy Whitlock MD.      Ct Abdomen Pelvis With Contrast    Result Date: 1/18/2019  1. Mild interval progression of patient's periaortic adenopathy since 10/12/2018. 2. Solitary new 13 mm right lobe liver lesion. 3. Slight progression of bony disease at L1, otherwise stable CT scan elsewhere.  D:  01/18/2019 E:  01/18/2019   This report was finalized on 1/18/2019 9:58 PM by DR. Dick aBtes MD.      Ct Abdomen Pelvis With Contrast    Result Date: 10/12/2018  1. No clear evidence of metastatic disease in the chest. 2. Progression of retroperitoneal adenopathy. 3. L1 mixed metastasis.  D:  10/12/2018 E:  10/12/2018  This report was finalized on 10/12/2018 4:07 PM by Clay Freeman.              Assessment/Plan    1.  Locally advanced metastatic prostate cancer: Continue Eligard .  He is currently on Zytiga and prednisone.  I reviewed his CAT scans of his abdomen pelvis.  He clearly has progressive disease.  Though it does not appear to be rapidly progressive.  He has a new lesion in the liver he also has mild progression of the periaortic adenopathy.  He also has slight progression of bony disease at L1.  I think it's reasonable to switch therapy at this time to Xtandi.  Unfortunately I don't think he is going to have a significant response to hormonal blockade at this point.  He had a nice response initially to Taxotere however that was also short-lived.  I think it's reasonable to do next generation sequencing on his tumor to see if there is any other choices.  I suspect am going to have to go to [facility] after this regimen.  We discussed the side effects of the treatment so far.  We will send off the studies as planned.    2.  Insomnia: on restoril    3.  Left lower extremity edema  secondary to lymphadenopathy - continue supportive care for now.     I spent a total of 25 minutes in direct patient care, greater than 21 minutes (greater than 50%) were spent in coordination of care, and counseling the patient regarding  metastatic prostate cancer . Answered any questions patient had with medication and plan.        Jolynn Dao MD  [facility]ematology and Oncology    1/19/2019     Return on: 02/22/19  Return in (Approximately): 6 weeks    Orders Placed This Encounter   Procedures   • Comprehensive metabolic panel     Standing Status:   Future     Standing Expiration Date:   2/1/2020   • PSA Diagnostic     Standing Status:   Standing     Number of Occurrences:   12     Standing Expiration Date:   2/18/2020   • Comprehensive Metabolic Panel     Standing Status:   Standing     Number of Occurrences:   12     Standing Expiration Date:   2/18/2020   • CBC and Differential     Standing Status:   Future     Standing Expiration Date:   2/1/2020     Order Specific Question:   Manual Differential     Answer:   No   • CBC & Differential     Standing Status:   Standing     Number of Occurrences:   12     Standing Expiration Date:   2/18/2020     Order Specific Question:   Manual Differential     Answer:   No         Please note that portions of this note may have been completed with a voice recognition program. Efforts were made to edit the dictations, but occasionally words are mistranscribed.

## 2019-01-21 NOTE — PROGRESS NOTES
Oral Chemotherapy Teaching      Patient Name/:  Sadi Stone   1955  Oral Chemotherapy Regimen:  Enzalutamide 160mg PO daily  Date Started Medication: Start as soon as medication is available    Additional Notes:  Reviewed Dr. Powell's note from 2019 and patient is switching from Zytiga to Xtandi due to disease progression. E-scribed enzalutamide 160mg (4 tablets) PO daily, #120 with 3 RF to [employer] to begin processing.  Patient will receive initial education and sign CCA and consent during PO chemo education appt once scheduled.

## 2019-01-22 NOTE — TELEPHONE ENCOUNTER
----- Message from Deloris Gamboa sent at 1/22/2019 11:17 AM EST -----  Regarding: YASMINE - RX QUESTION   Contact: [phone]  PATIENT CALLED AND SAID THAT YASMINE SAID NOT TO GET A REFILL ON HIS PREDNISONE AFTER HE RUNS OUT, THAT HE IS CHANGING HIM TO SOMETHING DIFFERENT.     HE WILL BE OUT SATURDAY, AND NEEDS TO GET THE NEW RX

## 2019-01-22 NOTE — TELEPHONE ENCOUNTER
Called patient and notified him that medication may not be in till next week and he will also need to be set up for medication education with the pharmacist. I informed him that he should be getting a call soon concerning this.

## 2019-01-23 NOTE — PROGRESS NOTES
Oral Chemotherapy Teaching      Patient Name/:  Sadi Stone   1955  Oral Chemotherapy Regimen:  enzalutamide 160 mg daily x 28 day cycle + leuprolide 45 mg every 6 months  Date Started Medication: Anticipate starting upon receipt of medication    Initial Teaching Follow Up Comments     Safety     Storage instructions (away from children; away from heat/cold, sunlight, or moisture), handling - use of gloves (caregivers), washing hands after touching pills, managing waste     “How are you storing your medications?”, reminders on storage, proper handling (caregivers using gloves, washing hands, away from children, managing waste, etc.), disposal of medication with D/C or dosage change    Mr. Stone has been on oral chemotherapy (abiraterone) until last week, so briefly reviewed proper storage and handling. Mr. Stone stores his medication on top of his fridge, and understands the importance of wearing gloves and washing his hands before and after taking enzalutamide.     Adherence      patient and/or caregiver on how to take medication, take with/without food, assess their adherence potential, stress importance of adherence, ways to manage adherence (pill boxes, phone reminders, calendars), what to do if miss a dose   “How are you taking your medication?” “How are you remembering to take your medication?”, “How many doses have you missed?”, determine reasons for non-adherence (not remembering, side effects, etc), ways to improve, overadherence? Remind patient of ways to improve/maintain adherence   Reviewed dosing instructions with Mr. Stone, including taking with out without food, taking a missed dose within 12 hours, and making sure he does not eat grapefruit or drink grapefruit juice while taking this medication. Mr. Stone expressed understanding through teach back. Also reminded him that he no longer needs to take prednisone with this medication.     Side Effects/Adverse Reactions       patient on potential side effects, s/s, ways to manage, when to call MD/seek help     Determine if patient experiencing side effects, ways to manage  Reviewed side effects that would be similar to his abiraterone initially, including edema, hot flashes, hypertension, fatigue, and muscle pain. Mr. Stone has experienced most of these adverse effects. Instructed him to monitor his response, and if he experiences a worsening of these symptoms to contact the clinic or report to the cancer center urgent care. Informed Mr. Stone of the risk for back and joint pain, which is common for him without this medication. Mr. Stone will monitor his pain and report if he experiences worsening pain. Discussed risk for constipation or diarrhea. For constipation, instructed him to use polyethylene glycol or docusate, ensure adequate water intake, and to avoid enemas or suppositories unless approved by the cancer center. For diarrhea, encouraged him to use loperamide per box instructions. Instructed him to call the cancer center if he experiences either of these for >24 hours. Finally, reviewed sings and symptoms of a UTI (frequency, urgency, dysuria, or flank pain), monitoring fever, and calling into clinic if he notices these symptoms or has a fever >100.4. He has experienced UTIs in the past and understands when to seek treatment.      Miscellaneous     Food interactions, DDIs, financial issues Determine if patient started any new medications since being placed on oral chemo (analyze for DDI) Again informed patient to avoid grapefruit and grapefruit juice. Otherwise, patient has no listed drug interactions. Mr. Stone did ask about eating hard boiled eggs, pizza, and salt while on this medication. Informed him that more salt may increase fluid retention and lead to more edema, however there is no specific interaction with his enzalutamide.     Additional Notes:  Mr. Stone expressed understanding of his  treatment and knows to call the clinic with any remaining questions.    Diana Lin, PharmD  Pharmacy Resident  1/23/2019  10:38 AM

## 2019-02-01 NOTE — TELEPHONE ENCOUNTER
PER HOME  STATED THEY DID NOT RECEIVE SCRIPT FOR REFILL OF:    OMEPRAZOLE 40 MG 1 A DAY 90 DAY SUPPLY       PLEASE RESEND PT IS COMPLETLEY OUT OF PRESCRIPTION     [facility]

## 2019-02-22 NOTE — PROGRESS NOTES
"     Malignant tumor of prostate (CMS/HCC)    12/13/2017 Initial Diagnosis     Prostate cancer metastatic to intrapelvic lymph node        - 1/30/2018 Radiation     Radiation OncologyTreatment Course:  Sadi Stone JrTara received 45 cGy in 18 fractions with Dr. Rosas at [facility]         1/30/2018 -  Hormonal Therapy     Received Eliguard every 6 months         5/7/2018 - 8/27/2018 Chemotherapy     Taxotere X 6 cycles         10/15/2018 -  Chemotherapy     OP PROSTATE Abiraterone / PredniSONE              REASON FOR VISIT: Metastatic Prostate Cancer    HISTORY OF PRESENT ILLNESS:   63 y.o.  male presents today for Follow-up of his locally advanced prostate cancer.  He completed 6 cycles of Taxotere in aug 2018. Currently on xtandi since January 2019 due to  retroperitoneal adenopathy worsen.   Still has pain that comes and goes. No night sweats. No fevers . Worsening anorexia        Past medical history, social history and family history was reviewed and unchanged from prior visit.    Review of Systems:    Review of Systems   Constitutional: Positive for appetite change and fatigue.   HENT:  Negative.    Eyes: Negative.    Respiratory: Negative.    Cardiovascular: Positive for leg swelling.   Gastrointestinal: Positive for abdominal pain.   Endocrine: Negative.    Genitourinary: Negative.     Musculoskeletal: Negative.    Skin: Negative.    Neurological: Negative.    Hematological: Negative.    Psychiatric/Behavioral: Positive for sleep disturbance.      A comprehensive 14 point review of systems was performed and was negative except as mentioned.      Medications:  The current medication list was reviewed in the EMR    ALLERGIES:    Allergies   Allergen Reactions   • Bactrim [Sulfamethoxazole-Trimethoprim] Anaphylaxis         Physical Exam    VITAL SIGNS:  /77 Comment: LUE  Pulse 114   Temp 97 °F (36.1 °C) (Temporal)   Resp 18   Ht 172.7 cm (68\")   Wt 74.8 kg (165 lb)   SpO2 99% Comment: " RA  BMI 25.09 kg/m²     Wt Readings from Last 3 Encounters:   02/22/19 74.8 kg (165 lb)   01/18/19 76.7 kg (169 lb)   01/16/19 76.7 kg (169 lb)        Performance Status: 0    General: well appearing, in no acute distress  HEENT: sclera anicteric, oropharynx clear, neck is supple  Lymphatics: no cervical, supraclavicular, or axillary adenopathy  Cardiovascular: regular rate and rhythm, no murmurs, rubs or gallops  Lungs: clear to auscultation bilaterally  Abdomen: soft, nontender, nondistended.  No palpable organomegaly  Extremities: + Left  lower extremity edema  Skin: no rashes, lesions, bruising, or petechiae  Msk:  Shows no weakness of the large muscle groups  Psych: Mood is stable    Lab Results   Component Value Date    HGB 11.0 (L) 02/22/2019    HCT 34.1 (L) 02/22/2019    MCV 95.2 02/22/2019     02/22/2019    WBC 7.70 02/22/2019    NEUTROABS 3.90 02/22/2019    LYMPHSABS 3.20 02/22/2019    MONOSABS 0.70 02/22/2019    EOSABS 0.04 10/12/2018    BASOSABS 0.03 10/12/2018     Lab Results   Component Value Date    GLUCOSE 98 02/22/2019    BUN 24 (H) 02/22/2019    CREATININE 1.30 02/22/2019     02/22/2019    K 4.4 02/22/2019     02/22/2019    CO2 26.0 02/22/2019    CALCIUM 9.4 02/22/2019    PROTEINTOT 6.2 02/22/2019    ALBUMIN 3.55 02/22/2019    BILITOT 0.7 02/22/2019    ALKPHOS 115 (H) 02/22/2019    AST 28 02/22/2019    ALT 10 02/22/2019       Lab Results   Component Value Date    PSA 1.810 02/22/2019    PSA 1.870 01/04/2019    PSA 1.770 12/17/2018    PSA 0.700 10/12/2018    PSA 0.370 08/27/2018       Ct Chest With Contrast    Result Date: 10/12/2018  1. No clear evidence of metastatic disease in the chest. 2. Progression of retroperitoneal adenopathy. 3. L1 mixed metastasis.  D:  10/12/2018 E:  10/12/2018  This report was finalized on 10/12/2018 4:07 PM by Clay Freeman.      Nm Bone Scan Whole Body    Result Date: 10/12/2018  Stable uptake of tracer seen within the L1 vertebral body. There is no  evidence of new tracer uptake identified. The examination is stable.  D:  10/12/2018 E:  10/12/2018    This report was finalized on 10/12/2018 4:54 PM by Dr. Indy Whitlock MD.      Ct Abdomen Pelvis With Contrast    Result Date: 1/18/2019  1. Mild interval progression of patient's periaortic adenopathy since 10/12/2018. 2. Solitary new 13 mm right lobe liver lesion. 3. Slight progression of bony disease at L1, otherwise stable CT scan elsewhere.  D:  01/18/2019 E:  01/18/2019   This report was finalized on 1/18/2019 9:58 PM by DR. Dick Bates MD.      Ct Abdomen Pelvis With Contrast    Result Date: 10/12/2018  1. No clear evidence of metastatic disease in the chest. 2. Progression of retroperitoneal adenopathy. 3. L1 mixed metastasis.  D:  10/12/2018 E:  10/12/2018  This report was finalized on 10/12/2018 4:07 PM by Clay Freeman.              Assessment/Plan    1.  Locally advanced metastatic prostate cancer: Continue Eligard .  I am going to continue his Xtandi for now.  It is too early to tell if this is refractory or not.  I spoke to him the length today regarding the plan going forward.  I would likely start him back on chemotherapy if this fails.  My concern is that he is hormone refractory at this point.  I also offered him a clinical trial at [facility] if he chooses to travel.  We will see how he does going forward.    2.  Insomnia: on restoril    3.  Left lower extremity edema secondary to lymphadenopathy - continue supportive care for now.     I spent a total of 25 minutes in direct patient care, greater than 21 minutes (greater than 50%) were spent in coordination of care, and counseling the patient regarding  metastatic prostate cancer . Answered any questions patient had with medication and plan.        Jolynn Dao MD  [facility] Hematology and Oncology    2/22/2019     Return on: 04/26/19  Return in (Approximately): 2 months    Orders Placed This Encounter   Procedures   • NM Bone  Scan Whole Body     Standing Status:   Future     Standing Expiration Date:   2/22/2020   • CT Abdomen Pelvis Without Contrast     Standing Status:   Future     Standing Expiration Date:   2/22/2020     Order Specific Question:   Will Oral Contrast be needed for this procedure?     Answer:   Yes   • CT Chest Without Contrast     Standing Status:   Future   • Comprehensive Metabolic Panel     Standing Status:   Future     Standing Expiration Date:   2/22/2020   • PSA Diagnostic     Standing Status:   Future     Standing Expiration Date:   2/22/2020   • CBC & Differential     Standing Status:   Future     Standing Expiration Date:   2/22/2020     Order Specific Question:   Manual Differential     Answer:   No         Please note that portions of this note may have been completed with a voice recognition program. Efforts were made to edit the dictations, but occasionally words are mistranscribed.

## 2019-02-22 NOTE — TELEPHONE ENCOUNTER
Called patient and spoke with wife and informed her that patient's PSA is level is down. I also informed her that his liver value labs are down also.

## 2019-03-06 PROBLEM — Z85.528 HISTORY OF PRIMARY MALIGNANT NEOPLASM OF KIDNEY: Status: ACTIVE | Noted: 2017-12-13

## 2019-03-06 PROBLEM — R10.13 EPIGASTRIC PAIN: Status: ACTIVE | Noted: 2019-01-01

## 2019-03-06 PROBLEM — K21.9 GASTROESOPHAGEAL REFLUX DISEASE: Status: ACTIVE | Noted: 2019-01-01

## 2019-03-06 NOTE — PATIENT INSTRUCTIONS
Gastroesophageal Reflux Disease, Adult  Normally, food travels down the esophagus and stays in the stomach to be digested. However, when a person has gastroesophageal reflux disease (GERD), food and stomach acid move back up into the esophagus. When this happens, the esophagus becomes sore and inflamed. Over time, GERD can create small holes (ulcers) in the lining of the esophagus.  What are the causes?  This condition is caused by a problem with the muscle between the esophagus and the stomach (lower esophageal sphincter, or LES). Normally, the LES muscle closes after food passes through the esophagus to the stomach. When the LES is weakened or abnormal, it does not close properly, and that allows food and stomach acid to go back up into the esophagus. The LES can be weakened by certain dietary substances, medicines, and medical conditions, including:  · Tobacco use.  · Pregnancy.  · Having a hiatal hernia.  · Heavy alcohol use.  · Certain foods and beverages, such as coffee, chocolate, onions, and peppermint.    What increases the risk?  This condition is more likely to develop in:  · People who have an increased body weight.  · People who have connective tissue disorders.  · People who use NSAID medicines.    What are the signs or symptoms?  Symptoms of this condition include:  · Heartburn.  · Difficult or painful swallowing.  · The feeling of having a lump in the throat.  · A bitter taste in the mouth.  · Bad breath.  · Having a large amount of saliva.  · Having an upset or bloated stomach.  · Belching.  · Chest pain.  · Shortness of breath or wheezing.  · Ongoing (chronic) cough or a night-time cough.  · Wearing away of tooth enamel.  · Weight loss.    Different conditions can cause chest pain. Make sure to see your health care provider if you experience chest pain.  How is this diagnosed?  Your health care provider will take a medical history and perform a physical exam. To determine if you have mild or severe  GERD, your health care provider may also monitor how you respond to treatment. You may also have other tests, including:  · An endoscopy to examine your stomach and esophagus with a small camera.  · A test that measures the acidity level in your esophagus.  · A test that measures how much pressure is on your esophagus.  · A barium swallow or modified barium swallow to show the shape, size, and functioning of your esophagus.    How is this treated?  The goal of treatment is to help relieve your symptoms and to prevent complications. Treatment for this condition may vary depending on how severe your symptoms are. Your health care provider may recommend:  · Changes to your diet.  · Medicine.  · Surgery.    Follow these instructions at home:  Diet  · Follow a diet as recommended by your health care provider. This may involve avoiding foods and drinks such as:  ? Coffee and tea (with or without caffeine).  ? Drinks that contain alcohol.  ? Energy drinks and sports drinks.  ? Carbonated drinks or sodas.  ? Chocolate and cocoa.  ? Peppermint and mint flavorings.  ? Garlic and onions.  ? Horseradish.  ? Spicy and acidic foods, including peppers, chili powder, laurent powder, vinegar, hot sauces, and barbecue sauce.  ? Citrus fruit juices and citrus fruits, such as oranges, matteo, and limes.  ? Tomato-based foods, such as red sauce, chili, salsa, and pizza with red sauce.  ? Fried and fatty foods, such as donuts, french fries, potato chips, and high-fat dressings.  ? High-fat meats, such as hot dogs and fatty cuts of red and white meats, such as rib eye steak, sausage, ham, and de luna.  ? High-fat dairy items, such as whole milk, butter, and cream cheese.  · Eat small, frequent meals instead of large meals.  · Avoid drinking large amounts of liquid with your meals.  · Avoid eating meals during the 2-3 hours before bedtime.  · Avoid lying down right after you eat.  · Do not exercise right after you eat.  General  instructions  · Pay attention to any changes in your symptoms.  · Take over-the-counter and prescription medicines only as told by your health care provider. Do not take aspirin, ibuprofen, or other NSAIDs unless your health care provider told you to do so.  · Do not use any tobacco products, including cigarettes, chewing tobacco, and e-cigarettes. If you need help quitting, ask your health care provider.  · Wear loose-fitting clothing. Do not wear anything tight around your waist that causes pressure on your abdomen.  · Raise (elevate) the head of your bed 6 inches (15cm).  · Try to reduce your stress, such as with yoga or meditation. If you need help reducing stress, ask your health care provider.  · If you are overweight, reduce your weight to an amount that is healthy for you. Ask your health care provider for guidance about a safe weight loss goal.  · Keep all follow-up visits as told by your health care provider. This is important.  Contact a health care provider if:  · You have new symptoms.  · You have unexplained weight loss.  · You have difficulty swallowing, or it hurts to swallow.  · You have wheezing or a persistent cough.  · Your symptoms do not improve with treatment.  · You have a hoarse voice.  Get help right away if:  · You have pain in your arms, neck, jaw, teeth, or back.  · You feel sweaty, dizzy, or light-headed.  · You have chest pain or shortness of breath.  · You vomit and your vomit looks like blood or coffee grounds.  · You faint.  · Your stool is bloody or black.  · You cannot swallow, drink, or eat.  This information is not intended to replace advice given to you by your health care provider. Make sure you discuss any questions you have with your health care provider.  Document Released: 09/27/2006 Document Revised: 05/17/2017 Document Reviewed: 04/13/2016  [employer] Interactive Patient Education © 2018 [employer]

## 2019-03-06 NOTE — PROGRESS NOTES
Chief Complaint   Patient presents with   • Abdominal Pain     he has a bad stomach pain all day. It is sensitive to the touch, he states it feels like an ulcer. His left side aches as well and hurts when he stand up.    • Hand Pain     he jammed his finger working on his car 3 weeks ago and it is still sore   • Choking     whenever he eats he has to clear his throat and force his food down.        Subjective   Sadi Stone Jr. is a 63 y.o. male    History of Present Illness  Patient is in today with complaints of abdominal pain.  In the epigastric region.  States is sensitive to touch and feels like when he had an ulcer.  Also complains of some pain in the left side when he stands up.  This is been ongoing for the approximately the last 2 months.  Patient does take omeprazole 40 mg daily and this does not seem to be helping.  Patient has been constipated and has been taking MiraLAX does have a bowel movement every 3 days.  Constipation occurs when he takes his hydrocodone.  Patient does have a history of prostate cancer.    Patient jammed his fingers working in the car 3 weeks ago and is sore.  On the left hand.  Finger is sore and can hardly bend it.  He does have swelling in the first knuckle.  He does not take any medication for his fingers.  Has not used any ice or heat.  Patient did use a splint this did not help.    Patient does have difficulty swallowing food.  This is been occurring for the past 3 months.  Still does this with water or eating food.        Allergies   Allergen Reactions   • Bactrim [Sulfamethoxazole-Trimethoprim] Anaphylaxis     Past Medical History:   Diagnosis Date   • Arthritis    • Glaucoma    • Peptic ulceration    • Prostate cancer (CMS/[facility]) 02/2018   • Ureteral obstruction, left 02/2018    due to metastatic prostate cancer      Past Surgical History:   Procedure Laterality Date   • KIDNEY SURGERY Left     removed a spot    • KNEE SURGERY     • SHOULDER SURGERY       Social  History     Socioeconomic History   • Marital status:      Spouse name: Not on file   • Number of children: Not on file   • Years of education: Not on file   • Highest education level: Not on file   Social Needs   • Financial resource strain: Not on file   • Food insecurity - worry: Not on file   • Food insecurity - inability: Not on file   • Transportation needs - medical: Not on file   • Transportation needs - non-medical: Not on file   Occupational History   • Not on file   Tobacco Use   • Smoking status: Never Smoker   • Smokeless tobacco: Never Used   Substance and Sexual Activity   • Alcohol use: No   • Drug use: No   • Sexual activity: Not Currently   Other Topics Concern   • Not on file   Social History Narrative   • Not on file        Current Outpatient Medications:   •  enzalutamide (XTANDI) 40 MG chemo capsule, Take 4 capsules by mouth Daily., Disp: 120 capsule, Rfl: 3  •  tamsulosin (FLOMAX) 0.4 MG capsule 24 hr capsule, Take 1 capsule by mouth Every Night., Disp: 90 capsule, Rfl: 3  •  ALPHAGAN P 0.1 % solution ophthalmic solution, , Disp: , Rfl:   •  brimonidine (ALPHAGAN) 0.15 % ophthalmic solution, , Disp: , Rfl:   •  pantoprazole (PROTONIX) 40 MG EC tablet, Take 1 tablet by mouth Daily., Disp: 30 tablet, Rfl: 5    Current Facility-Administered Medications:   •  pneumococcal conj. 13-valent (PREVNAR-13) vaccine 0.5 mL, 0.5 mL, Intramuscular, Once, Jh Danielle, APRN     Review of Systems   Constitutional: Negative for chills and fever.   Eyes: Positive for redness.   Respiratory: Positive for cough. Negative for shortness of breath and wheezing.    Cardiovascular: Negative for chest pain.   Gastrointestinal: Positive for abdominal pain and constipation. Negative for diarrhea, nausea and vomiting.   Genitourinary: Positive for difficulty urinating ( weak stream at times, Nocturia 4-5 times per night). Negative for dysuria.   Psychiatric/Behavioral: Negative for sleep disturbance.        Objective     Vitals:    03/06/19 0948   BP: 120/60   Pulse: 87   Temp: 99.1 °F (37.3 °C)   SpO2: 99%       Results for orders placed or performed in visit on 02/22/19   PSA Diagnostic   Result Value Ref Range    PSA 1.810 0.000 - 4.000 ng/mL   Comprehensive Metabolic Panel   Result Value Ref Range    Glucose 98 70 - 100 mg/dL    BUN 24 (H) 9 - 23 mg/dL    Creatinine 1.30 0.60 - 1.30 mg/dL    Sodium 139 132 - 146 mmol/L    Potassium 4.4 3.5 - 5.5 mmol/L    Chloride 105 99 - 109 mmol/L    CO2 26.0 20.0 - 31.0 mmol/L    Calcium 9.4 8.7 - 10.4 mg/dL    Total Protein 6.2 5.7 - 8.2 g/dL    Albumin 3.55 3.20 - 4.80 g/dL    ALT (SGPT) 10 7 - 40 U/L    AST (SGOT) 28 0 - 33 U/L    Alkaline Phosphatase 115 (H) 25 - 100 U/L    Total Bilirubin 0.7 0.3 - 1.2 mg/dL    eGFR  African Amer 67 >60 mL/min/1.73    Globulin 2.7 gm/dL    A/G Ratio 1.3 (L) 1.5 - 2.5 g/dL    BUN/Creatinine Ratio 18.5 7.0 - 25.0    Anion Gap 8.0 3.0 - 11.0 mmol/L   CBC Auto Differential   Result Value Ref Range    WBC 7.70 3.50 - 10.80 10*3/mm3    RBC 3.58 (L) 4.20 - 5.76 10*6/mm3    Hemoglobin 11.0 (L) 13.1 - 17.5 g/dL    Hematocrit 34.1 (L) 38.9 - 50.9 %    RDW 14.2 11.3 - 14.5 %    MCV 95.2 80.0 - 99.0 fL    MCH 30.8 27.0 - 31.0 pg    MCHC 32.4 32.0 - 36.0 g/dL    MPV 7.1 6.0 - 12.0 fL    Platelets 309 150 - 450 10*3/mm3    Neutrophil % 50.5 41.0 - 71.0 %    Lymphocyte % 41.0 24.0 - 44.0 %    Monocyte % 8.5 0.0 - 12.0 %    Neutrophils, Absolute 3.90 1.50 - 8.30 10*3/mm3    Lymphocytes, Absolute 3.20 0.60 - 4.80 10*3/mm3    Monocytes, Absolute 0.70 0.00 - 1.00 10*3/mm3       Physical Exam   Constitutional: He is oriented to person, place, and time. He appears well-developed and well-nourished.   HENT:   Head: Normocephalic and atraumatic.   Mouth/Throat: Uvula is midline and mucous membranes are normal. Posterior oropharyngeal erythema present.   Cardiovascular: Normal rate, regular rhythm and normal heart sounds.   Pulmonary/Chest: Effort normal  and breath sounds normal.   Abdominal: Soft. Bowel sounds are normal. There is tenderness (epigastric region down mid ABD).   Musculoskeletal: He exhibits no edema.        Left hand: He exhibits bony tenderness (base of left index finger swollen and tender). Normal sensation noted. Normal strength noted.   Neurological: He is alert and oriented to person, place, and time.   Skin: Skin is warm and dry.   Psychiatric: He has a normal mood and affect. His behavior is normal.   Vitals reviewed.      Assessment/Plan   Problem List Items Addressed This Visit        Digestive    PUD (peptic ulcer disease)    Relevant Medications    pantoprazole (PROTONIX) 40 MG EC tablet    Other Relevant Orders    Ambulatory referral for Screening EGD    Gastroesophageal reflux disease    Relevant Medications    pantoprazole (PROTONIX) 40 MG EC tablet    Other Relevant Orders    Ambulatory referral for Screening EGD       Nervous and Auditory    Epigastric pain - Primary    Relevant Orders    Ambulatory referral for Screening EGD      Other Visit Diagnoses     Crushing injury of left index finger, initial encounter        Relevant Orders    XR Hand 3+ View Left    Dysphagia, unspecified type        Relevant Orders    Ambulatory Referral to ENT (Otolaryngology)      Will change his omeprazole to Protonix 40 mg daily.  Get a screening EGD.  And for swallowing difficulty refer him to ENT.    For his injury to left index finger will get a x-ray.  Make further recommendations pending outcome of this test.    Patient was encouraged to keep me informed of any acute changes, lack of improvement, or any new concerning symptoms.Plan of care discussed with pt. They verbalized understanding and agreement.     RV- after testing.    Counseling provided on GERD.    Jh Danielle, BENI   3/6/2019   10:36 AM

## 2019-03-15 NOTE — TELEPHONE ENCOUNTER
I called and left a message regarding the pathology. There was no evidence for H. pylori on the stomach biopsies.  There was evidence for reflux.  I recommend that he continue the proton pump inhibitor medication.

## 2019-03-15 NOTE — PROGRESS NOTES
Chief Complaint   Patient presents with   • Urinary Tract Infection     Has some discomfort when urinating. This has been occuring for the past couple of days    • Abdominal Pain     He states he is still having abdominal pain. He says the pain comes and goes and sometimes it severe pain and other times its not       Subjective   Sadi Stone Jr. is a 63 y.o. male    History of Present Illness  Patient with complaints of abdominal pain.  He was seen for this 3/6/2019 and had been going on 2 months at that time.  The abdominal pain is in the epigastric region and feels sensitive to touch like when he had an ulcer.  Had been taking omeprazole at that time but it did not seem to be helping so he was switched at that time to Protonix 40 mg daily.  He states this has not helped his abdominal pain at all.  He also had a EGD On 3/12/2019 which showed gastritis without bleeding, diaphragmatic hernia without obstruction or gangrene, and esophagitis.  Patient had recommendations to avoid certain foods smoking coffee and caffeinated beverages carbonated beverages fat fried food and other medications.  Weight down 11 pounds since January.    Patient also with complaints of dysuria.  Has been occurring for the past couple days.  Patient denies any blood in the urine.  Does have a history of prostate cancer and he is being treated for that currently.    Allergies   Allergen Reactions   • Bactrim [Sulfamethoxazole-Trimethoprim] Anaphylaxis     Past Medical History:   Diagnosis Date   • Arthritis    • Glaucoma    • Peptic ulceration    • Prostate cancer (CMS/HCC) 02/2018   • Ureteral obstruction, left 02/2018    due to metastatic prostate cancer      Past Surgical History:   Procedure Laterality Date   • KIDNEY SURGERY Left     removed a spot    • KNEE SURGERY     • SHOULDER SURGERY       Social History     Socioeconomic History   • Marital status:      Spouse name: Not on file   • Number of children: Not on file   •  Years of education: Not on file   • Highest education level: Not on file   Social Needs   • Financial resource strain: Not on file   • Food insecurity - worry: Not on file   • Food insecurity - inability: Not on file   • Transportation needs - medical: Not on file   • Transportation needs - non-medical: Not on file   Occupational History   • Not on file   Tobacco Use   • Smoking status: Never Smoker   • Smokeless tobacco: Never Used   Substance and Sexual Activity   • Alcohol use: No   • Drug use: No   • Sexual activity: Not Currently   Other Topics Concern   • Not on file   Social History Narrative   • Not on file        Current Outpatient Medications:   •  ALPHAGAN P 0.1 % solution ophthalmic solution, , Disp: , Rfl:   •  brimonidine (ALPHAGAN) 0.15 % ophthalmic solution, , Disp: , Rfl:   •  enzalutamide (XTANDI) 40 MG chemo capsule, Take 4 capsules by mouth Daily., Disp: 120 capsule, Rfl: 3  •  HYDROcodone-acetaminophen (NORCO)  MG per tablet, Every 4 (Four) Hours., Disp: , Rfl:   •  polyethylene glycol (MIRALAX) powder, Miralax 17 gram/dose oral powder  Take by oral route., Disp: , Rfl:   •  tamsulosin (FLOMAX) 0.4 MG capsule 24 hr capsule, Take 1 capsule by mouth Every Night., Disp: 90 capsule, Rfl: 3  •  dexlansoprazole (DEXILANT) 60 MG capsule, Take 1 capsule by mouth Daily for 30 days., Disp: 30 capsule, Rfl: 2  •  nitrofurantoin, macrocrystal-monohydrate, (MACROBID) 100 MG capsule, Take 1 capsule by mouth 2 (Two) Times a Day., Disp: 10 capsule, Rfl: 0    Current Facility-Administered Medications:   •  pneumococcal conj. 13-valent (PREVNAR-13) vaccine 0.5 mL, 0.5 mL, Intramuscular, Once, Jh Danielle, APRN     Review of Systems   Constitutional: Negative for chills, fatigue, fever and unexpected weight change.   Respiratory: Negative for cough, chest tightness, shortness of breath and wheezing.    Cardiovascular: Negative for chest pain, palpitations and leg swelling.   Gastrointestinal:  Positive for abdominal pain and constipation. Negative for diarrhea, nausea and vomiting.   Endocrine: Positive for heat intolerance.   Genitourinary: Positive for difficulty urinating, dysuria and frequency.   Musculoskeletal: Positive for back pain.   Skin: Negative for color change and rash.   Neurological: Negative for dizziness, syncope, weakness and headaches.   Psychiatric/Behavioral: Positive for sleep disturbance.       Objective     Vitals:    03/15/19 1016   BP: 118/80   Pulse: 57   Temp: 97.3 °F (36.3 °C)   SpO2: 98%       Results for orders placed or performed in visit on 03/15/19   POC Urinalysis Dipstick, Automated   Result Value Ref Range    Color Yellow Yellow, Straw, Dark Yellow, Renae    Clarity, UA Cloudy (A) Clear    Specific Gravity  1.025 1.005 - 1.030    pH, Urine 6.0 5.0 - 8.0    Leukocytes Large (3+) (A) Negative    Nitrite, UA Negative Negative    Protein, POC 1+ (A) Negative mg/dL    Glucose, UA Negative Negative, 1000 mg/dL (3+) mg/dL    Ketones, UA Negative Negative    Urobilinogen, UA Normal Normal    Bilirubin Negative Negative    Blood, UA 3+ (A) Negative       Physical Exam   Constitutional: He is oriented to person, place, and time. He appears well-developed and well-nourished.   HENT:   Head: Normocephalic and atraumatic.   Cardiovascular: Normal rate, regular rhythm and normal heart sounds.   Pulmonary/Chest: Effort normal and breath sounds normal.   Abdominal: Soft. Bowel sounds are normal. There is tenderness (epigastric).   Musculoskeletal: He exhibits no edema.   Neurological: He is alert and oriented to person, place, and time.   Skin: Skin is warm and dry.   Psychiatric: He has a normal mood and affect. His behavior is normal.   Vitals reviewed.      Assessment/Plan   Problem List Items Addressed This Visit        Digestive    Gastroesophageal reflux disease    Relevant Medications    dexlansoprazole (DEXILANT) 60 MG capsule       Genitourinary    Malignant tumor of  prostate (CMS/HCC)    Relevant Medications    tamsulosin (FLOMAX) 0.4 MG capsule 24 hr capsule    Recurrent UTI - Primary    Relevant Medications    nitrofurantoin, macrocrystal-monohydrate, (MACROBID) 100 MG capsule    Other Relevant Orders    POC Urinalysis Dipstick, Automated (Completed)      Other Visit Diagnoses     Benign prostatic hyperplasia with nocturia        Relevant Medications    nitrofurantoin, macrocrystal-monohydrate, (MACROBID) 100 MG capsule    tamsulosin (FLOMAX) 0.4 MG capsule 24 hr capsule      Patient to work on GERD diet.  I will give him a copy of this.  We will get a change his Protonix to Dexilant 60 mg daily.  Gave patient copy of his recent EGD.Patient was encouraged to keep me informed of any acute changes, lack of improvement, or any new concerning symptoms.Plan of care discussed with pt. They verbalized understanding and agreement.     For UTI we will start him on nitrofurantoin 100 mg twice daily.  As he is allergic to sulfa.Patient to take medications as directed. Make sure to take all of them. Return if no improvement or worsens in 5-7 days. If concerned after hours, may go to [facility] or ER for evaluation/Treatment.    RV- 1 mo    Counseling provided on GERD and UTI.    Jh Danielle, APRN   3/15/2019   11:10 AM

## 2019-03-15 NOTE — PATIENT INSTRUCTIONS
Food Choices for Gastroesophageal Reflux Disease, Adult  When you have gastroesophageal reflux disease (GERD), the foods you eat and your eating habits are very important. Choosing the right foods can help ease your discomfort. Think about working with a nutrition specialist (dietitian) to help you make good choices.  What are tips for following this plan?  Meals  · Choose healthy foods that are low in fat, such as fruits, vegetables, whole grains, low-fat dairy products, and lean meat, fish, and poultry.  · Eat small meals often instead of 3 large meals a day. Eat your meals slowly, and in a place where you are relaxed. Avoid bending over or lying down until 2-3 hours after eating.  · Avoid eating meals 2-3 hours before bed.  · Avoid drinking a lot of liquid with meals.  · Cook foods using methods other than frying. Bake, grill, or broil food instead.  · Avoid or limit:  ? Chocolate.  ? Peppermint or spearmint.  ? Alcohol.  ? Pepper.  ? Black and decaffeinated coffee.  ? Black and decaffeinated tea.  ? Bubbly (carbonated) soft drinks.  ? Caffeinated energy drinks and soft drinks.  · Limit high-fat foods such as:  ? Fatty meat or fried foods.  ? Whole milk, cream, butter, or ice cream.  ? Nuts and nut butters.  ? Pastries, donuts, and sweets made with butter or shortening.  · Avoid foods that cause symptoms. These foods may be different for everyone. Common foods that cause symptoms include:  ? Tomatoes.  ? Oranges, matteo, and limes.  ? Peppers.  ? Spicy food.  ? Onions and garlic.  ? Vinegar.  Lifestyle    · Maintain a healthy weight. Ask your doctor what weight is healthy for you. If you need to lose weight, work with your doctor to do so safely.  · Exercise for at least 30 minutes for 5 or more days each week, or as told by your doctor.  · Wear loose-fitting clothes.  · Do not smoke. If you need help quitting, ask your doctor.  · Sleep with the head of your bed higher than your feet. Use a wedge under the  mattress or blocks under the bed frame to raise the head of the bed.  Summary  · When you have gastroesophageal reflux disease (GERD), food and lifestyle choices are very important in easing your symptoms.  · Eat small meals often instead of 3 large meals a day. Eat your meals slowly, and in a place where you are relaxed.  · Limit high-fat foods such as fatty meat or fried foods.  · Avoid bending over or lying down until 2-3 hours after eating.  · Avoid peppermint and spearmint, caffeine, alcohol, and chocolate.  This information is not intended to replace advice given to you by your health care provider. Make sure you discuss any questions you have with your health care provider.  Document Released: 06/18/2013 Document Revised: 01/23/2018 Document Reviewed: 01/23/2018  [employer] Interactive Patient Education © 2019 [employer]

## 2019-03-18 NOTE — TELEPHONE ENCOUNTER
PATIENT WAS TOLD TO AVOID CERTAIN FOODS AND HE HAS FORGOTTEN WHAT THEY WERE. HE IS CONFUSED AND DAUGHTER DID NOT HAVE THE AVS WE GAVE HIM.    PLEASE CALL PATIENTS'S CELL PHONE [phone] TO ADVISE

## 2019-03-20 NOTE — TELEPHONE ENCOUNTER
Attempted to call patient and patients daughter. LVM on both phones. Will mail out the diet plan.

## 2019-03-20 NOTE — TELEPHONE ENCOUNTER
Lexy Smart 10 minutes ago (11:49 AM)         Pt daughter called on behalf of pt about medication      PLEASE ADVISE

## 2019-03-22 NOTE — PROGRESS NOTES
Oral Chemotherapy Teaching      Patient Name/:  Sadi Stone   1955  Oral Chemotherapy Regimen:  Xtandi 160mg PO daily  Date Started Medication:  19    Initial Teaching Follow Up Comments     Safety     Storage instructions (away from children; away from heat/cold, sunlight, or moisture), handling - use of gloves (caregivers), washing hands after touching pills, managing waste     “How are you storing your medications?”, reminders on storage, proper handling (caregivers using gloves, washing hands, away from children, managing waste, etc.), disposal of medication with D/C or dosage change    Pt confirms appropriate storage and handling.      Adherence      patient and/or caregiver on how to take medication, take with/without food, assess their adherence potential, stress importance of adherence, ways to manage adherence (pill boxes, phone reminders, calendars), what to do if miss a dose   “How are you taking your medication?” “How are you remembering to take your medication?”, “How many doses have you missed?”, determine reasons for non-adherence (not remembering, side effects, etc), ways to improve, overadherence? Remind patient of ways to improve/maintain adherence   Pt confirmed administration of Xtandi. Confirms picking up tefill today for next month supply of medication. Discussed Xtandi 160mg (4 capsules) by mouth once a day..      Side Effects/Adverse Reactions      patient on potential side effects, s/s, ways to manage, when to call MD/seek help     Determine if patient experiencing side effects, ways to manage  Pt reports tolerating therapy. Describes some issues with nausea lasting ~ 5 minutes after administration of Xtandi and flaring with meals. Reviewed use of zofran and taking nausea medication 30 minutes prior to meals to assist if needed. Pt reports decreased appetite and ~ 10 lb weight loss. Will send a note to dietician for additional assistance.      Miscellaneous      Food interactions, DDIs, financial issues Determine if patient started any new medications since being placed on oral chemo (analyze for DDI) Medication dispensed from [facility] retail.        Additional Notes:  Discussed aforementioned material with patient  over the phone. All questions and concerns addressed. Provided patient with my contact information and instructions to call should additional questions arise.

## 2019-03-29 NOTE — TELEPHONE ENCOUNTER
----- Message from Estuardo ALLAN Bryan sent at 3/29/2019 10:15 AM EDT -----  Contact: [phone]  Pt called stating he had talked to Dr. Powell about a medication to help with his loss of appetite, he would like it called in to his pharmacy, [facility] [address] [address]  Please call pt if/when this is sent to pharmacy.

## 2019-04-03 NOTE — TELEPHONE ENCOUNTER
----- Message from Estuardo Bryan sent at 4/3/2019  3:20 PM EDT -----  Contact: [phone]  Pt LVM that he is having surgery in the morning but he is constipated. He is asking if Dr. Powell can give him something that will work tonight since he has surgery in the morning?  Please call.....

## 2019-04-03 NOTE — TELEPHONE ENCOUNTER
Called patient and informed him that Dr. Powell wants patient to drink a bottle of Magnesium Citrate. Informed patient if the Miralax has not started working then he will need something stronger. Patient stated he understood and would go get some.

## 2019-04-15 PROBLEM — K76.9 LIVER LESION, LEFT LOBE: Status: ACTIVE | Noted: 2019-01-01

## 2019-04-15 NOTE — PROGRESS NOTES
"Chief Complaint   Patient presents with   • Abdominal Pain     He is still having aching pain in his stomach everytime he eats and drinks and it bothers him all night long and randomly throughout the day. Has not had much of an appetite and is losing weight. He states that nothing helps with this pain in his stomach. He usually waits the pain out, the pain medication he uses will work short term and does not provide him much relief. When he does take the medicine it causes him to become constipated very badly        Subjective   Sadi Stone Jr. is a 64 y.o. male    History of Present Illness  Patient with complaints of abdominal pain.  He was seen for this 3/6/2019 and had been going on 3-4 months at that time.  The abdominal pain is in the epigastric region and feels sensitive to touch like when he had an ulcer.  Had been taking omeprazole at that time but it did not seem to be helping so he was switched at that time to Protonix 40 mg daily.  He states this has not helped his abdominal pain at all.  He also had a EGD On 3/12/2019 which showed gastritis without bleeding, diaphragmatic hernia without obstruction or gangrene, and esophagitis.  Patient had recommendations to avoid certain foods smoking coffee and caffeinated beverages carbonated beverages fat fried food and other medications.  Weight down 19 pounds since Feb. Pt reports feeling weak and not wanting to do anything. Pain lasting 5 min and occurs many time throughout the day. Drinking or eating anything causes pain to occur. Pt does have constipation and uses Miralax, has BM daily with this. Pain 6/10, \"feels like my ulcer\", mid epigastric.  Scan reviewed from January 4, 2019 which showed regression of patient's aortic adenopathy since 10/12/2018 and a solitary new 13 mm right liver lesion.      Allergies   Allergen Reactions   • Bactrim [Sulfamethoxazole-Trimethoprim] Anaphylaxis     Past Medical History:   Diagnosis Date   • Arthritis    • " Glaucoma    • Peptic ulceration    • Prostate cancer (CMS/HCC) 02/2018   • Ureteral obstruction, left 02/2018    due to metastatic prostate cancer      Past Surgical History:   Procedure Laterality Date   • KIDNEY SURGERY Left     removed a spot    • KNEE SURGERY     • SHOULDER SURGERY       Social History     Socioeconomic History   • Marital status:      Spouse name: Not on file   • Number of children: Not on file   • Years of education: Not on file   • Highest education level: Not on file   Tobacco Use   • Smoking status: Never Smoker   • Smokeless tobacco: Never Used   Substance and Sexual Activity   • Alcohol use: No   • Drug use: No   • Sexual activity: Not Currently        Current Outpatient Medications:   •  ALPHAGAN P 0.1 % solution ophthalmic solution, , Disp: , Rfl:   •  brimonidine (ALPHAGAN) 0.15 % ophthalmic solution, , Disp: , Rfl:   •  dronabinol (MARINOL) 5 MG capsule, Take 1 capsule by mouth 2 (Two) Times a Day Before Meals., Disp: 60 capsule, Rfl: 3  •  enzalutamide (XTANDI) 40 MG chemo capsule, Take 4 capsules by mouth Daily., Disp: 120 capsule, Rfl: 3  •  HYDROcodone-acetaminophen (NORCO)  MG per tablet, Every 4 (Four) Hours., Disp: , Rfl:   •  polyethylene glycol (MIRALAX) powder, Miralax 17 gram/dose oral powder  Take by oral route., Disp: , Rfl:   •  tamsulosin (FLOMAX) 0.4 MG capsule 24 hr capsule, Take 1 capsule by mouth Every Night., Disp: 90 capsule, Rfl: 3  •  dicyclomine (BENTYL) 20 MG tablet, Take 1 tablet by mouth Every 6 (Six) Hours., Disp: 120 tablet, Rfl: 1  •  sucralfate (CARAFATE) 1 g tablet, Take 1 tablet by mouth 4 (Four) Times a Day., Disp: 120 tablet, Rfl: 2    Current Facility-Administered Medications:   •  pneumococcal conj. 13-valent (PREVNAR-13) vaccine 0.5 mL, 0.5 mL, Intramuscular, Once, Jh Danielle, APRN     Review of Systems   Constitutional: Negative for chills, fatigue and unexpected weight change.   Respiratory: Negative for cough, chest  tightness, shortness of breath and wheezing.    Cardiovascular: Negative for chest pain, palpitations and leg swelling.   Gastrointestinal: Positive for abdominal pain and constipation. Negative for diarrhea, nausea and vomiting.   Genitourinary: Negative for difficulty urinating and dysuria.   Skin: Negative for color change and rash.   Neurological: Negative for dizziness, syncope, weakness and headaches.   Psychiatric/Behavioral: Negative for sleep disturbance.       Objective     Vitals:    04/15/19 0859   BP: 120/60   Pulse: 106   Temp: 96.9 °F (36.1 °C)   SpO2: 98%       Results for orders placed or performed in visit on 03/15/19   POC Urinalysis Dipstick, Automated   Result Value Ref Range    Color Yellow Yellow, Straw, Dark Yellow, Renae    Clarity, UA Cloudy (A) Clear    Specific Gravity  1.025 1.005 - 1.030    pH, Urine 6.0 5.0 - 8.0    Leukocytes Large (3+) (A) Negative    Nitrite, UA Negative Negative    Protein, POC 1+ (A) Negative mg/dL    Glucose, UA Negative Negative, 1000 mg/dL (3+) mg/dL    Ketones, UA Negative Negative    Urobilinogen, UA Normal Normal    Bilirubin Negative Negative    Blood, UA 3+ (A) Negative       Physical Exam   Constitutional: He is oriented to person, place, and time. He appears well-developed and well-nourished.   HENT:   Head: Normocephalic and atraumatic.   Cardiovascular: Normal rate, regular rhythm and normal heart sounds.   Pulmonary/Chest: Effort normal and breath sounds normal.   Abdominal: Soft. Bowel sounds are normal. There is tenderness (epigastric).   Musculoskeletal: He exhibits no edema.   Neurological: He is alert and oriented to person, place, and time.   Skin: Skin is warm and dry.   Psychiatric: He has a normal mood and affect. His behavior is normal.   Vitals reviewed.      Assessment/Plan   Problem List Items Addressed This Visit        Digestive    Gastroesophageal reflux disease    Relevant Medications    sucralfate (CARAFATE) 1 g tablet    dicyclomine  (BENTYL) 20 MG tablet    Liver lesion, left lobe       Nervous and Auditory    Epigastric pain - Primary    Relevant Medications    sucralfate (CARAFATE) 1 g tablet    dicyclomine (BENTYL) 20 MG tablet       Genitourinary    Malignant tumor of prostate (CMS/HCC)      Will give patient a trial of Carafate and Bentyl to see if improves his GI symptoms.  Did discuss this could be related to his liver lesion from his cancer.  Discussed he did also need to talk it over with his oncologist.  He is to keep me informed of his does not improve his symptoms.Patient was encouraged to keep me informed of any acute changes, lack of improvement, or any new concerning symptoms.Plan of care discussed with pt. They verbalized understanding and agreement.  I review CT of abdomen with patient and gave him and his wife a copy.    RV- 3 mo or sooner      Jh Danielle, BENI   4/15/2019   2:07 PM

## 2019-04-24 NOTE — TELEPHONE ENCOUNTER
----- Message from Deloris Gamboa sent at 4/24/2019  8:19 AM EDT -----  Regarding: YASMINE - RX REFILL   Contact: [phone]  PATIENT IS COMPLETELY OUT OF HIS ONDANSETRON , CAN YOU PLEASE GET SENT TO [facility] ON [facility]

## 2019-04-26 NOTE — PROGRESS NOTES
Malignant tumor of prostate (CMS/HCC)    12/13/2017 Initial Diagnosis     Prostate cancer metastatic to intrapelvic lymph node          - 1/30/2018 Radiation     Radiation OncologyTreatment Course:  Sadi Stone JrTara received 45 cGy in 18 fractions with Dr. Rosas at [facility]         1/30/2018 -  Hormonal Therapy     Received Eliguard every 6 months         5/7/2018 - 8/27/2018 Chemotherapy     Taxotere X 6 cycles         10/15/2018 -  Chemotherapy     OP PROSTATE Abiraterone / PredniSONE              REASON FOR VISIT: Metastatic Prostate Cancer    HISTORY OF PRESENT ILLNESS:   64 y.o.  male presents today for Follow-up of his locally advanced prostate cancer.  He completed 6 cycles of Taxotere in aug 2018.  After which I had placed him on Zytiga.  He progressed in January 2019.  Subsequently was switched to Xtandi which again shows progression now in April 2019.  He is becoming more symptomatic with more abdominal pain and poor appetite.  Functional status still fairly reasonable.  He does have pain medicines written but not taking it due to constipation issues.        Past medical history, social history and family history was reviewed and unchanged from prior visit.    Review of Systems:    Review of Systems   Constitutional: Positive for appetite change and fatigue.   HENT:  Negative.    Eyes: Negative.    Respiratory: Negative.    Cardiovascular: Positive for leg swelling.   Gastrointestinal: Positive for abdominal pain.   Endocrine: Negative.    Genitourinary: Negative.     Musculoskeletal: Negative.    Skin: Negative.    Neurological: Negative.    Hematological: Negative.    Psychiatric/Behavioral: Positive for sleep disturbance.      A comprehensive 14 point review of systems was performed and was negative except as mentioned.      Medications:  The current medication list was reviewed in the EMR    ALLERGIES:    Allergies   Allergen Reactions   • Bactrim [Sulfamethoxazole-Trimethoprim]  "Anaphylaxis         Physical Exam    VITAL SIGNS:  /52   Pulse 59   Temp 97.8 °F (36.6 °C) (Oral)   Resp 16   Ht 172.7 cm (68\")   Wt 64.9 kg (143 lb 3 oz)   SpO2 98%   BMI 21.77 kg/m²     Wt Readings from Last 3 Encounters:   04/26/19 64.9 kg (143 lb 3 oz)   04/15/19 66.2 kg (146 lb)   03/15/19 71.7 kg (158 lb)        Performance Status: 0    General: well appearing, in no acute distress  HEENT: sclera anicteric, oropharynx clear, neck is supple  Lymphatics: no cervical, supraclavicular, or axillary adenopathy  Cardiovascular: regular rate and rhythm, no murmurs, rubs or gallops  Lungs: clear to auscultation bilaterally  Abdomen: soft, nontender, nondistended.  No palpable organomegaly  Extremities: + Left  lower extremity edema  Skin: no rashes, lesions, bruising, or petechiae  Msk:  Shows no weakness of the large muscle groups  Psych: Mood is stable    Lab Results   Component Value Date    HGB 11.0 (L) 02/22/2019    HCT 34.1 (L) 02/22/2019    MCV 95.2 02/22/2019     02/22/2019    WBC 7.70 02/22/2019    NEUTROABS 3.90 02/22/2019    LYMPHSABS 3.20 02/22/2019    MONOSABS 0.70 02/22/2019    EOSABS 0.04 10/12/2018    BASOSABS 0.03 10/12/2018     Lab Results   Component Value Date    GLUCOSE 98 02/22/2019    BUN 24 (H) 02/22/2019    CREATININE 1.30 02/22/2019     02/22/2019    K 4.4 02/22/2019     02/22/2019    CO2 26.0 02/22/2019    CALCIUM 9.4 02/22/2019    PROTEINTOT 6.2 02/22/2019    ALBUMIN 3.55 02/22/2019    BILITOT 0.7 02/22/2019    ALKPHOS 115 (H) 02/22/2019    AST 28 02/22/2019    ALT 10 02/22/2019       Lab Results   Component Value Date    PSA 1.810 02/22/2019    PSA 1.870 01/04/2019    PSA 1.770 12/17/2018    PSA 0.700 10/12/2018    PSA 0.370 08/27/2018       Ct Abdomen Pelvis Without Contrast    Result Date: 4/22/2019  Progression of disease with increase seen in size of the retroperitoneal adenopathy. The lesion within the liver is also increased in size. The bony metastatic " disease appears to be stable. Imaging of the chest reveals several noncalcified nodules suggesting possible old healed granulomatous disease. Continued follow-up recommended.  D:  04/22/2019 E:  04/22/2019  This report was finalized on 4/22/2019 5:30 PM by Dr. Indy Whitlock MD.      Ct Chest Without Contrast    Result Date: 4/22/2019  Progression of disease with increase seen in size of the retroperitoneal adenopathy. The lesion within the liver is also increased in size. The bony metastatic disease appears to be stable. Imaging of the chest reveals several noncalcified nodules suggesting possible old healed granulomatous disease. Continued follow-up recommended.  D:  04/22/2019 E:  04/22/2019  This report was finalized on 4/22/2019 5:30 PM by Dr. Indy Whitlock MD.      Nm Bone Scan Whole Body    Result Date: 4/23/2019  Stable subtle uptake of tracer again seen within the L1 vertebral body level. No evidence of progression of disease. There is also slight delay in visualization of the renal pelvis of the right kidney suggesting possible renal insufficiency.  D:  04/23/2019 E:  04/23/2019    This report was finalized on 4/23/2019 3:46 PM by Dr. Indy Whitlock MD.      Ct Abdomen Pelvis With Contrast    Result Date: 1/18/2019  1. Mild interval progression of patient's periaortic adenopathy since 10/12/2018. 2. Solitary new 13 mm right lobe liver lesion. 3. Slight progression of bony disease at L1, otherwise stable CT scan elsewhere.  D:  01/18/2019 E:  01/18/2019   This report was finalized on 1/18/2019 9:58 PM by DR. Dick Bates MD.            Assessment/Plan    1.  Locally advanced metastatic prostate cancer: Continue Eligard .  I spoke to him and his wife today at length regarding his CAT scans which show clear progression of disease.  At this time he is completely castrate resistant and I do not think further hormone blockade is done I have meaningful benefit.  I have given him option of clinical  trial versus standard of care.  He would prefer to stay here for now.  I like to rechallenge him with Taxotere since he never actually progressed on it and reduce the dose to 50 mg/m² and give it every 2 weeks.  This will allow him to at least tolerated without significant side effects.  He was having mild neuropathy at our last visit.  Our other option is cabazitaxel though I feel that this can be held until he fails Taxotere.  We will plan to reinitiate his therapy in couple of weeks.    2.  Insomnia: on restoril    3.  Left lower extremity edema secondary to lymphadenopathy - continue supportive care for now.     4.  Anorexia.  Patient tried Marinol with hallucinations.  So this was discontinued.    I spent a total of 25 minutes in direct patient care, greater than 20 minutes (greater than 50%) were spent in coordination of care, and counseling the patient regarding  metastatic prostate cancer . Answered any questions patient had with medication and plan.        Jolynn Dao MD  [facility]    4/26/2019     Return on: 05/17/19  Return in (Approximately): 3 weeks, Schedule with next infusion    No orders of the defined types were placed in this encounter.        Please note that portions of this note may have been completed with a voice recognition program. Efforts were made to edit the dictations, but occasionally words are mistranscribed.

## 2019-05-13 NOTE — TELEPHONE ENCOUNTER
----- Message from Radha Godfrey sent at 5/13/2019 11:55 AM EDT -----  Contact: [phone]  Pt is having funny taste in his mouth, since the start of his treatment along stomach pain. Pt wants to know what he can take. Please give pt a call back [phone]

## 2019-05-13 NOTE — TELEPHONE ENCOUNTER
Returned call to patient and spoke to daughter. Patient's daughter stated patient is having stomach cramp with no diarrhea. Patient is taking Marilax to help him go to bathroom. Patient daughter also stated he complains of having a funny taste in his mouth suggested to daughter to have patient to try hard candy and see if that works. Patient's daughter stated she understood.

## 2019-05-17 NOTE — PROGRESS NOTES
Malignant tumor of prostate (CMS/HCC)    12/13/2017 Initial Diagnosis     Prostate cancer metastatic to intrapelvic lymph node          - 1/30/2018 Radiation     Radiation OncologyTreatment Course:  Sadi Stone Jr. received 45 cGy in 18 fractions with Dr. Rosas at [facility]         1/30/2018 -  Hormonal Therapy     Received Eliguard every 6 months         5/7/2018 - 8/27/2018 Chemotherapy     1. Taxotere X 6 cycles May- Aug 2018  2. Zytiga with Prednisone Oct 2018 - Jan 2019  3. Xtandi Jan 2019 - May 2019            REASON FOR VISIT: Metastatic Prostate Cancer    HISTORY OF PRESENT ILLNESS:   64 y.o.  male presents today for Follow-up of his locally advanced prostate cancer.  He completed 6 cycles of Taxotere in aug 2018.  After which I had placed him on Zytiga.  He progressed in January 2019.  Subsequently was switched to Xtandi which again shows progression  in April 2019.  He became more symptomatic with more abdominal pain and poor appetite.  Reinitiated Taxotere in April 2019.    He seems to be doing reasonably well.  Denies any headaches or vision changes.  Still has weakness especially in his left knee.  Appetite is exceedingly poor.  He tried Marinol which gave him hallucinations.        Past medical history, social history and family history was reviewed and unchanged from prior visit.    Review of Systems:    Review of Systems   Constitutional: Positive for appetite change and fatigue.   HENT:  Negative.    Eyes: Negative.    Respiratory: Negative.    Cardiovascular: Positive for leg swelling.   Gastrointestinal: Positive for abdominal pain.   Endocrine: Negative.    Genitourinary: Negative.     Musculoskeletal: Negative.    Skin: Negative.    Neurological: Negative.    Hematological: Negative.    Psychiatric/Behavioral: Positive for sleep disturbance.      A comprehensive 14 point review of systems was performed and was negative except as mentioned.      Medications:  The current  "medication list was reviewed in the EMR    ALLERGIES:    Allergies   Allergen Reactions   • Bactrim [Sulfamethoxazole-Trimethoprim] Anaphylaxis         Physical Exam    VITAL SIGNS:  BP 97/61   Pulse 93   Temp 97.3 °F (36.3 °C) (Temporal)   Resp 20   Ht 172.7 cm (68\")   Wt 63.4 kg (139 lb 12.8 oz)   SpO2 98%   BMI 21.26 kg/m²     Wt Readings from Last 3 Encounters:   05/17/19 63.4 kg (139 lb 12.8 oz)   05/06/19 63.9 kg (140 lb 14.4 oz)   04/26/19 64.9 kg (143 lb 3 oz)        Performance Status: 0    General: well appearing, in no acute distress  HEENT: sclera anicteric, oropharynx clear, neck is supple  Lymphatics: no cervical, supraclavicular, or axillary adenopathy  Cardiovascular: regular rate and rhythm, no murmurs, rubs or gallops  Lungs: clear to auscultation bilaterally  Abdomen: soft, nontender, nondistended.  No palpable organomegaly  Extremities: + Left  lower extremity edema  Skin: no rashes, lesions, bruising, or petechiae  Msk:  Shows no weakness of the large muscle groups  Psych: Mood is stable    Lab Results   Component Value Date    HGB 9.4 (L) 05/17/2019    HCT 29.4 (L) 05/17/2019    MCV 96.8 05/17/2019     05/17/2019    WBC 2.30 (L) 05/17/2019    NEUTROABS 0.10 (L) 05/17/2019    LYMPHSABS 2.00 05/17/2019    MONOSABS 0.20 05/17/2019    EOSABS 0.04 10/12/2018    BASOSABS 0.03 10/12/2018     Lab Results   Component Value Date    GLUCOSE 155 (H) 05/06/2019    BUN 18 05/06/2019    CREATININE 1.40 (H) 05/06/2019     (L) 05/06/2019    K 3.9 05/06/2019    CL 96 (L) 05/06/2019    CO2 25.0 05/06/2019    CALCIUM 9.6 05/06/2019    PROTEINTOT 7.4 05/06/2019    ALBUMIN 3.50 05/06/2019    BILITOT 0.7 05/06/2019    ALKPHOS 120 (H) 05/06/2019    AST 27 05/06/2019    ALT 9 05/06/2019       Lab Results   Component Value Date    PSA 8.640 (H) 05/06/2019    PSA 1.810 02/22/2019    PSA 1.870 01/04/2019    PSA 1.770 12/17/2018    PSA 0.700 10/12/2018       Ct Abdomen Pelvis Without Contrast    Result " Date: 4/22/2019  Progression of disease with increase seen in size of the retroperitoneal adenopathy. The lesion within the liver is also increased in size. The bony metastatic disease appears to be stable. Imaging of the chest reveals several noncalcified nodules suggesting possible old healed granulomatous disease. Continued follow-up recommended.  D:  04/22/2019 E:  04/22/2019  This report was finalized on 4/22/2019 5:30 PM by Dr. Indy Whitlock MD.      Ct Chest Without Contrast    Result Date: 4/22/2019  Progression of disease with increase seen in size of the retroperitoneal adenopathy. The lesion within the liver is also increased in size. The bony metastatic disease appears to be stable. Imaging of the chest reveals several noncalcified nodules suggesting possible old healed granulomatous disease. Continued follow-up recommended.  D:  04/22/2019 E:  04/22/2019  This report was finalized on 4/22/2019 5:30 PM by Dr. Indy Whitlock MD.      Nm Bone Scan Whole Body    Result Date: 4/23/2019  Stable subtle uptake of tracer again seen within the L1 vertebral body level. No evidence of progression of disease. There is also slight delay in visualization of the renal pelvis of the right kidney suggesting possible renal insufficiency.  D:  04/23/2019 E:  04/23/2019    This report was finalized on 4/23/2019 3:46 PM by Dr. Indy Whitlock MD.      Ct Abdomen Pelvis With Contrast    Result Date: 1/18/2019  1. Mild interval progression of patient's periaortic adenopathy since 10/12/2018. 2. Solitary new 13 mm right lobe liver lesion. 3. Slight progression of bony disease at L1, otherwise stable CT scan elsewhere.  D:  01/18/2019 E:  01/18/2019   This report was finalized on 1/18/2019 9:58 PM by DR. Dick Bates MD.            Assessment/Plan    1.  Locally advanced metastatic prostate cancer: Continue cycle #2 of Taxotere.  We will check and see how he is doing with his numbers.  Likely will complete 3 cycles  before I reimage him.    2.  Insomnia: on restoril    3.  Left lower extremity edema secondary to lymphadenopathy - continue supportive care for now.     4.  Anorexia.  We will try Megace at a higher dose.    I spent a total of 25 minutes in direct patient care, greater than 20 minutes (greater than 50%) were spent in coordination of care, and counseling the patient regarding  metastatic prostate cancer . Answered any questions patient had with medication and plan.        Jolynn Dao MD  [facility] Hematology and Oncology    5/17/2019     Return on: 06/03/19  Return in (Approximately): Schedule with next infusion, 2 weeks    Orders Placed This Encounter   Procedures   • PSA Diagnostic     Standing Status:   Future     Standing Expiration Date:   5/19/2020   • Comprehensive metabolic panel     Standing Status:   Future     Standing Expiration Date:   5/19/2020   • PSA Diagnostic     Standing Status:   Future     Standing Expiration Date:   6/2/2020   • Comprehensive metabolic panel     Standing Status:   Future     Standing Expiration Date:   6/2/2020   • CBC and Differential     Standing Status:   Future     Standing Expiration Date:   5/19/2020     Order Specific Question:   Manual Differential     Answer:   No   • CBC and Differential     Standing Status:   Future     Standing Expiration Date:   6/2/2020     Order Specific Question:   Manual Differential     Answer:   No         Please note that portions of this note may have been completed with a voice recognition program. Efforts were made to edit the dictations, but occasionally words are mistranscribed.

## 2019-05-20 NOTE — TELEPHONE ENCOUNTER
----- Message from Estuardo Bryan sent at 5/20/2019 11:42 AM EDT -----  Pt's wife called, Dr. Powell was supposed to send an RX for appetite to pharmacy but it has never been sent to pharmacy.  His note from Friday's visit states: 4.  Anorexia.  We will try Megace at a higher dose.    Please send Megace RX to [facility] [address]

## 2019-05-20 NOTE — TELEPHONE ENCOUNTER
Called patient and informed patient's wife that [facility] had stated they didn't receive medication and nurse went ahead and gave verbal order for Megace 400 mg 2 x day. Patient's wife stated she under stood.

## 2019-05-20 NOTE — TELEPHONE ENCOUNTER
----- Message from Alison Heath RN sent at 5/20/2019  8:23 AM EDT -----  Regarding: YASMINE  Mr. Stone is here for Taxotere C2/D1. His ANC is 0.5.    Please advise.  Thank you,  Alison [phone]    Push back 1 week.

## 2019-05-22 NOTE — TELEPHONE ENCOUNTER
Patient's daughter, Shaw, called with several questions regarding nutrition and supplements. Advised I would send a message for the dietician to contact her regarding her questions.  Message sent to Vianney Gavin to contact patient's daughter.

## 2019-05-23 NOTE — PROGRESS NOTES
"ONC Nutrition    Diagnosis:   Metastatic prostate cancer / GERD  Weight: 139 lbs reflecting an additional weight loss of 5-6 lbs over the past 3 months    Phone consult with patient. Reviewed current oral food intake which indicates that he is not consuming adequate calorie and nutrient intake to maintain nutritional status and weight; evidenced by continued weight loss.  He was prescribed Megace last week, but has not started taking it yet; patient strongly encourage to go ahead and start it for appetite stimulation.  On prior consults had encouraged patient to \"graze\" throughout the day, consuming some oral intake every hour or so and to also try and increase the nutritional supplements to 3 servings per day in light of diminished oral intake; again, encouraged him to choose the supplements that were nutrient dense with 350 calories per serving; discussed flavor enhancement to increase appeal.  Patient does not drink milk, stating that it causes constipation.  Discussed tips for management of constipation related to pain medication.    The discussion today reflected prior recommendations which patient has been hesitant to implement.  "

## 2019-06-03 NOTE — PROGRESS NOTES
Malignant tumor of prostate (CMS/HCC)    12/13/2017 Initial Diagnosis     Prostate cancer metastatic to intrapelvic lymph node          - 1/30/2018 Radiation     Radiation OncologyTreatment Course:  Sadi Stone Jr. received 45 cGy in 18 fractions with Dr. Rosas at [facility]         1/30/2018 -  Hormonal Therapy     Received Eliguard every 6 months         5/7/2018 - 8/27/2018 Chemotherapy     1. Taxotere X 6 cycles May- Aug 2018  2. Zytiga with Prednisone Oct 2018 - Jan 2019  3. Xtandi Jan 2019 - May 2019            REASON FOR VISIT: Metastatic Prostate Cancer    HISTORY OF PRESENT ILLNESS:   64 y.o.  male presents today for Follow-up of his locally advanced prostate cancer.  He completed 6 cycles of Taxotere in aug 2018.  After which I had placed him on Zytiga.  He progressed in January 2019.  Subsequently was switched to Xtandi which again shows progression  in April 2019.  He became more symptomatic with more abdominal pain and poor appetite.  Reinitiated Taxotere in April 2019.    He is having some cytopenias related to his chemotherapy.  We had to delay his last treatment due to neutropenia.  Clinically he has a lot of fatigue.  He does not have any energy.  Denies any nausea vomiting.  Denies any recent infections.      Past medical history, social history and family history was reviewed and unchanged from prior visit.    Review of Systems:    Review of Systems   Constitutional: Positive for appetite change and fatigue.   HENT:  Negative.    Eyes: Negative.    Respiratory: Negative.    Cardiovascular: Positive for leg swelling.   Gastrointestinal: Positive for abdominal pain.   Endocrine: Negative.    Genitourinary: Negative.     Musculoskeletal: Negative.    Skin: Negative.    Neurological: Negative.    Hematological: Negative.    Psychiatric/Behavioral: Positive for sleep disturbance.      A comprehensive 14 point review of systems was performed and was negative except as  "mentioned.      Medications:  The current medication list was reviewed in the EMR    ALLERGIES:    Allergies   Allergen Reactions   • Bactrim [Sulfamethoxazole-Trimethoprim] Anaphylaxis         Physical Exam    VITAL SIGNS:  /65 Comment: LUE  Pulse (!) 126   Temp 98.5 °F (36.9 °C) (Temporal)   Resp 20   Ht 172.7 cm (68\")   Wt 67.1 kg (148 lb)   SpO2 99% Comment: RA  BMI 22.50 kg/m²     Wt Readings from Last 3 Encounters:   06/03/19 67.1 kg (148 lb)   05/28/19 66.5 kg (146 lb 9.6 oz)   05/20/19 63.2 kg (139 lb 6.4 oz)        Performance Status: 0    General: well appearing, in no acute distress  HEENT: sclera anicteric, oropharynx clear, neck is supple  Lymphatics: no cervical, supraclavicular, or axillary adenopathy  Cardiovascular: regular rate and rhythm, no murmurs, rubs or gallops  Lungs: clear to auscultation bilaterally  Abdomen: soft, nontender, nondistended.  No palpable organomegaly  Extremities: + Left  lower extremity edema  Skin: no rashes, lesions, bruising, or petechiae  Msk:  Shows no weakness of the large muscle groups  Psych: Mood is stable    Lab Results   Component Value Date    HGB 8.8 (L) 05/28/2019    HCT 26.6 (L) 05/28/2019    MCV 97.0 05/28/2019     05/28/2019    WBC 11.10 (H) 05/28/2019    NEUTROABS 9.00 (H) 05/28/2019    LYMPHSABS 1.50 05/28/2019    MONOSABS 0.60 05/28/2019    EOSABS 0.04 10/12/2018    BASOSABS 0.03 10/12/2018     Lab Results   Component Value Date    GLUCOSE 159 (H) 05/28/2019    BUN 31 (H) 05/28/2019    CREATININE 1.29 (H) 05/28/2019     05/28/2019    K 4.5 05/28/2019     (H) 05/28/2019    CO2 20.0 (L) 05/28/2019    CALCIUM 9.3 05/28/2019    PROTEINTOT 6.2 05/28/2019    ALBUMIN 2.90 (L) 05/28/2019    BILITOT 0.4 05/28/2019    ALKPHOS 88 05/28/2019    AST 28 05/28/2019    ALT 8 05/28/2019       Lab Results   Component Value Date    PSA 7.670 (H) 05/28/2019    PSA 6.130 (H) 05/20/2019    PSA 8.640 (H) 05/06/2019    PSA 1.810 02/22/2019    PSA " 1.870 01/04/2019       Ct Abdomen Pelvis Without Contrast    Result Date: 4/22/2019  Progression of disease with increase seen in size of the retroperitoneal adenopathy. The lesion within the liver is also increased in size. The bony metastatic disease appears to be stable. Imaging of the chest reveals several noncalcified nodules suggesting possible old healed granulomatous disease. Continued follow-up recommended.  D:  04/22/2019 E:  04/22/2019  This report was finalized on 4/22/2019 5:30 PM by Dr. Indy Whitlock MD.      Ct Chest Without Contrast    Result Date: 4/22/2019  Progression of disease with increase seen in size of the retroperitoneal adenopathy. The lesion within the liver is also increased in size. The bony metastatic disease appears to be stable. Imaging of the chest reveals several noncalcified nodules suggesting possible old healed granulomatous disease. Continued follow-up recommended.  D:  04/22/2019 E:  04/22/2019  This report was finalized on 4/22/2019 5:30 PM by Dr. Indy Whitlock MD.      Nm Bone Scan Whole Body    Result Date: 4/23/2019  Stable subtle uptake of tracer again seen within the L1 vertebral body level. No evidence of progression of disease. There is also slight delay in visualization of the renal pelvis of the right kidney suggesting possible renal insufficiency.  D:  04/23/2019 E:  04/23/2019    This report was finalized on 4/23/2019 3:46 PM by Dr. Indy Whitlock MD.      Ct Abdomen Pelvis With Contrast    Result Date: 1/18/2019  1. Mild interval progression of patient's periaortic adenopathy since 10/12/2018. 2. Solitary new 13 mm right lobe liver lesion. 3. Slight progression of bony disease at L1, otherwise stable CT scan elsewhere.  D:  01/18/2019 E:  01/18/2019   This report was finalized on 1/18/2019 9:58 PM by DR. Dick Bates MD.            Assessment/Plan    1.  Locally advanced metastatic prostate cancer: Plan for cycle #3 of Taxotere next week..  We will  check and see how he is doing with his numbers.  I will reimage him with CAT scans of the chest abdomen pelvis prior to next treatment.    2.  Insomnia: on restoril    3.  Left lower extremity edema secondary to lymphadenopathy - continue supportive care for now.     4.  Anorexia.  No significant improvement.    5.  Fatigue related to malignancy and chemotherapy.  Will try  Ritalin twice a day to see if it improves.    I spent a total of 25 minutes in direct patient care, greater than 20 minutes (greater than 50%) were spent in coordination of care, and counseling the patient regarding  metastatic prostate cancer . Answered any questions patient had with medication and plan.        Jolynn Dao MD  [facility] Hematology and Oncology    6/3/2019     Return on: 06/24/19  Return in (Approximately): 3 weeks    Orders Placed This Encounter   Procedures   • CT Abdomen Pelvis With Contrast     Standing Status:   Future     Standing Expiration Date:   6/2/2020     Order Specific Question:   Will Oral Contrast be needed for this procedure?     Answer:   Yes         Please note that portions of this note may have been completed with a voice recognition program. Efforts were made to edit the dictations, but occasionally words are mistranscribed.

## 2019-06-10 NOTE — PROGRESS NOTES
Per BENI Glass tx to be held today due to lab results. Patient to return in one week on 6/17 for treatment.

## 2019-06-18 NOTE — ADDENDUM NOTE
Encounter addended by: Gretchen Osborne RN on: 6/18/2019 9:43 AM   Actions taken: Visit diagnoses modified, Order list changed, Diagnosis association updated, Sign clinical note

## 2019-06-18 NOTE — PROGRESS NOTES
Patient's CMP labs hemolyzed on 6-17-19. Patient discharged prior to lab notifying us of need to redraw specimen. Patient to return this Thursday, 6-20-19 for CT scan at [facility]. I spoke with patient's wife to have his CMP collection performed on 6-20-19. Patient's wife verbalized understanding. I called CT scan on [facility] and they are able to draw his CMP.

## 2019-06-24 NOTE — PROGRESS NOTES
Malignant tumor of prostate (CMS/HCC)    12/13/2017 Initial Diagnosis     Prostate cancer metastatic to intrapelvic lymph node          - 1/30/2018 Radiation     Radiation OncologyTreatment Course:  Sadi Stone Jr. received 45 cGy in 18 fractions with Dr. Rosas at [facility]         1/30/2018 -  Hormonal Therapy     Received Eliguard every 6 months         5/7/2018 - 8/27/2018 Chemotherapy     1. Taxotere X 6 cycles May- Aug 2018  2. Zytiga with Prednisone Oct 2018 - Jan 2019  3. Xtandi Jan 2019 - May 2019            REASON FOR VISIT: Metastatic Prostate Cancer    HISTORY OF PRESENT ILLNESS:   64 y.o.  male presents today for Follow-up of his locally advanced prostate cancer.  He completed 6 cycles of Taxotere in aug 2018.  After which I had placed him on Zytiga.  He progressed in January 2019.  Subsequently was switched to Xtandi which again shows progression  in April 2019.  He became more symptomatic with more abdominal pain and poor appetite.  Reinitiated Taxotere in April 2019.    Unfortunately he has progression of disease on treatment.  Clinically having pain in his lower extremities.  Also some swelling.  Performance status still fairly reasonable.  Appetite still poor.    Past medical history, social history and family history was reviewed and unchanged from prior visit.    Review of Systems:    Review of Systems   Constitutional: Positive for appetite change and fatigue.   HENT:  Negative.    Eyes: Negative.    Respiratory: Negative.    Cardiovascular: Positive for leg swelling.   Gastrointestinal: Positive for abdominal pain.   Endocrine: Negative.    Genitourinary: Negative.     Musculoskeletal: Negative.    Skin: Negative.    Neurological: Negative.    Hematological: Negative.    Psychiatric/Behavioral: Positive for sleep disturbance.      A comprehensive 14 point review of systems was performed and was negative except as mentioned.      Medications:  The current medication list  "was reviewed in the EMR    ALLERGIES:    Allergies   Allergen Reactions   • Bactrim [Sulfamethoxazole-Trimethoprim] Anaphylaxis         Physical Exam    VITAL SIGNS:  /80 Comment: RUE  Pulse 76   Temp 97.2 °F (36.2 °C) (Temporal)   Resp 28   Ht 172.7 cm (68\")   Wt 66.7 kg (147 lb)   SpO2 100% Comment: RA  BMI 22.35 kg/m²     Wt Readings from Last 3 Encounters:   06/24/19 66.7 kg (147 lb)   06/17/19 67.6 kg (149 lb)   06/10/19 69.4 kg (153 lb)        Performance Status: 0    General: well appearing, in no acute distress  HEENT: sclera anicteric, oropharynx clear, neck is supple  Lymphatics: no cervical, supraclavicular, or axillary adenopathy  Cardiovascular: regular rate and rhythm, no murmurs, rubs or gallops  Lungs: clear to auscultation bilaterally  Abdomen: soft, nontender, nondistended.  No palpable organomegaly  Extremities: + Left  lower extremity edema  Skin: no rashes, lesions, bruising, or petechiae  Msk:  Shows no weakness of the large muscle groups  Psych: Mood is stable    Lab Results   Component Value Date    HGB 9.3 (L) 06/24/2019    HCT 28.5 (L) 06/24/2019    MCV 97.9 (H) 06/24/2019     06/24/2019    WBC 6.00 06/24/2019    NEUTROABS 4.50 06/24/2019    LYMPHSABS 1.20 06/24/2019    MONOSABS 0.20 06/24/2019    EOSABS 0.04 10/12/2018    BASOSABS 0.03 10/12/2018     Lab Results   Component Value Date    GLUCOSE 159 (H) 05/28/2019    BUN 31 (H) 05/28/2019    CREATININE 1.29 (H) 05/28/2019     05/28/2019    K 4.5 05/28/2019     (H) 05/28/2019    CO2 20.0 (L) 05/28/2019    CALCIUM 9.3 05/28/2019    PROTEINTOT 6.2 05/28/2019    ALBUMIN 2.90 (L) 05/28/2019    BILITOT 0.4 05/28/2019    ALKPHOS 88 05/28/2019    AST 28 05/28/2019    ALT 8 05/28/2019       Lab Results   Component Value Date    PSA 7.970 (H) 06/10/2019    PSA 7.670 (H) 05/28/2019    PSA 6.130 (H) 05/20/2019    PSA 8.640 (H) 05/06/2019    PSA 1.810 02/22/2019       Ct Abdomen Pelvis Without Contrast    Result Date: " 4/22/2019  Progression of disease with increase seen in size of the retroperitoneal adenopathy. The lesion within the liver is also increased in size. The bony metastatic disease appears to be stable. Imaging of the chest reveals several noncalcified nodules suggesting possible old healed granulomatous disease. Continued follow-up recommended.  D:  04/22/2019 E:  04/22/2019  This report was finalized on 4/22/2019 5:30 PM by Dr. Indy Whitlock MD.      Ct Chest Without Contrast    Result Date: 4/22/2019  Progression of disease with increase seen in size of the retroperitoneal adenopathy. The lesion within the liver is also increased in size. The bony metastatic disease appears to be stable. Imaging of the chest reveals several noncalcified nodules suggesting possible old healed granulomatous disease. Continued follow-up recommended.  D:  04/22/2019 E:  04/22/2019  This report was finalized on 4/22/2019 5:30 PM by Dr. Indy Whitlock MD.      Nm Bone Scan Whole Body    Result Date: 4/23/2019  Stable subtle uptake of tracer again seen within the L1 vertebral body level. No evidence of progression of disease. There is also slight delay in visualization of the renal pelvis of the right kidney suggesting possible renal insufficiency.  D:  04/23/2019 E:  04/23/2019    This report was finalized on 4/23/2019 3:46 PM by Dr. Indy Whitlock MD.      Ct Abdomen Pelvis With Contrast    Result Date: 6/21/2019  1. Progressive hepatic metastasis and retroperitoneal adenopathy from prior comparison along with mild body wall edema. 2. Lytic area within the L1 vertebral body again noted.  D:  06/20/2019 E:  06/20/2019  This report was finalized on 6/21/2019 4:05 PM by Dr. Josh Peace.            Assessment/Plan    1.  Locally advanced metastatic prostate cancer: I reviewed his CAT scans today.  He has clear progression of disease in the liver and lymphadenopathy.  I spoke to him about the implications of this.  He will be  switched to Cabazitaxol.  Unfortunately this is his final standard of care chemotherapy that would have any benefit.  I do offered him a clinical trial if he is interested.  He is not interested in traveling.  Unfortunately we do not have good options for him at this time.    2.  Insomnia: on restoril    3.  Left lower extremity edema secondary to lymphadenopathy - continue supportive care for now.     4.  Anorexia.  No significant improvement.    5.  Fatigue related to malignancy and chemotherapy.  Will try  Ritalin twice a day to see if it improves.    I spent a total of 25 minutes in direct patient care, greater than 20 minutes (greater than 50%) were spent in coordination of care, and counseling the patient regarding  metastatic prostate cancer . Answered any questions patient had with medication and plan.        Jolynn Dao MD  [facility]    6/24/2019     Return on: 07/22/19  Return in (Approximately): 1 month    Orders Placed This Encounter   Procedures   • Comprehensive metabolic panel     Standing Status:   Future     Standing Expiration Date:   6/30/2020   • PSA Diagnostic     Standing Status:   Future     Standing Expiration Date:   6/30/2020   • CBC and Differential     Standing Status:   Future     Standing Expiration Date:   6/30/2020     Order Specific Question:   Manual Differential     Answer:   No         Please note that portions of this note may have been completed with a voice recognition program. Efforts were made to edit the dictations, but occasionally words are mistranscribed.

## 2019-07-01 NOTE — PLAN OF CARE
Outpatient Infusion • [address] [facility] • [address] • [place] [id number] • [phone]      CHEMOTHERAPY EDUCATION SHEET    NAME:  Sadi Stone      : 1955           DATE: 19    Booklets Given: Chemotherapy and You []  Eating Hints []    Sexuality/Fertility Books []     Chemotherapy/Biotherapy Education Sheets: (list all that apply)  Provided patient with [employer] print out for cabazitaxel.                                                                                                                                                                Chemotherapy Regimen:  Cabazitaxel IV Day 1 + Prednisone PO Daily of a 21-Day cycle     TOPICS EDUCATION PROVIDED EDUCATION REINFORCED COMMENTS   ANEMIA:  role of RBC, cause, s/s, ways to manage, role of transfusion [] [x] Educated that RBC deliver oxygen throughout the body and if decreased by chemotherapy may cause tiredness. Noted to call if this is bothersome and that blood would be checked to assess and treat this appropriately.    THROMBOCYTOPENIA:  role of platelet, cause, s/s, ways to prevent bleeding, things to avoid, when to seek help [] [x] Educated that platelets are involved in blood clotting and if decreased by chemotherapy it may cause easier bruising or harder to stop bleeding. Noted that this is nothing to be concerned about and that applying pressure to a cut/bleed should stop it.   NEUTROPENIA:  role of WBC, cause, infection precautions, s/s of infection, when to call MD [] [x] Educated that WBC fight off infection and if decreased by chemotherapy it increases the risk of infection. Noted that if any small children are around they should stay away when they feel sick at all.   NUTRITION & APPETITE CHANGES:  importance of maintaining healthy diet & weight, ways to manage to improve intake, dietary consult, exercise regimen [] [x] Patient is on Megace for appetite. Noted to keep it as normal as possible to keep nutrition  status up.   DIARRHEA:  causes, s/s of dehydration, ways to manage, dietary changes, when to call MD [x] [] Educated that diarrhea may happen with chemotherapy. I noted that loperamide could be used to treat this if it occurs and wrote loperamide on the print out I provided patient and wife.   CONSTIPATION:  causes, ways to manage, dietary changes, when to call MD [x] [] Educated that constipation may occur with chemotherapy. Patient had miralax on their profile so I noted that or colace could be a treatment option. Patient stated prune juice works for him, so I recommended he keep using that.   NAUSEA & VOMITING:  cause, use of antiemetics, dietary changes, when to call MD [x] [] Educated that nausea/vomiting is a low risk with this medication. Noted Zofran 8 mg TID is available to take as needed and educated to take at onset of nausea.    MOUTH SORES:  causes, oral care, ways to manage [] []    ALOPECIA:  cause, ways to manage, resources [] []    INFERTILITY & SEXUALITY:  causes, fertility preservation options, sexuality changes, ways to manage, importance of birth control [x] [] Noted to use safe sex practices while on this medication.   NERVOUS SYSTEM CHANGES:  causes, s/s, neuropathies, cognitive changes, ways to manage [x] [] I educated on the risk of peripheral neuropathy and explained it would feel like numbness or tingling in hands or toes. Patient noted he already has an issue with this so I told him to make sure to notify doctor or nurse if he notices any changes after starting this.   PAIN:  causes, ways to manage [x] [] Patient is currently on pain medications so I noted to just take as prescribed at the onset of pain. I noted that back or joint pain has been seen with this medication as well.   SKIN & NAIL CHANGES:  cause, s/s, ways to manage [] []    ORGAN TOXICITIES:  cause, s/s, need for diagnostic tests, labs, when to notify MD [x] [] Educated that blood in the urine has been seen noting to tell  doctor or nurse if this occurs and that kidney and liver function will be monitoring using blood tests. Noted the risk of infusion-type reaction and to notify if he felt SOB or dizzy during infusion. Educated that famotidine, diphenhydramine, and dexamethasone are being given as pre-meds to hopefully prevent this.   SURVIVORSHIP:  distress, distress assessment, secondary malignancies, early/late effects, follow-up, social issues, social support [] []    HOME CARE:  use of spill kits, storing of PO chemo, how to manage bodily fluids [] []    MISCELLANEOUS:  drug interactions, administration, vesicant, et [x] [] Noted that this will be infused over 60 minutes. Patient thought he was told longer, so I double checked and assured them it was over an hour.     Referrals:       Notes:   Educated that he would take prednisone daily in the morning since it can keep you up and can be taken with food or milk to decrease stomach upset. Patient didn't take it this morning, so I am notifying nurse in case this changes steroid administration. Patient asked about recommendations for getting his taste back - after asking Jyoti, we are having nutritionist come talk to him to give him recommendations.     Thanks,  Janessa Samson, PharmD Candidate 2020  Pharmacist Intern  7/1/2019  8:17 AM

## 2019-07-02 NOTE — PROGRESS NOTES
Onc Nutrition     Patient:  Sadi Stone Jr.  YOB: 1955    Diagnosis: metastatic prostate cancer  Treatment:  Jevtana - every 21 days    Weight: 148#; up ~9# since last RD visit (5/23/19) note patient with lower extremity edema  Nutrition Symptoms:  Taste changes    Met with patient and his wife during his chemotherapy infusion appointment.  Note patient has started on a new treatment plan due to progressive disease.  Patient states his appetite has improved since starting Megace ~1 month ago.  He states he is drinking 1-2 Boost daily to aid with calorie and protein intake.  His biggest nutritional complaint is taste changes at this time.    Reviewed the importance of good nutrition during his treatment course.  Advised him to be eating smaller more frequent meals/snacks throughout the day with an emphasis on high protein foods and adequate hydration.  Also advised him to use the baking soda/salt solution before meals to aid with taste changes.  Provided written diet materials to reinforce information discussed.  Also provided Boost coupons.    Answered their questions and both voiced understanding of information discussed.  Encouraged them to call RD with questions.  Will monitor as needed during his treatment course.    Indy Jaramillo, KIMBERLY  07/02/19

## 2019-07-12 NOTE — TELEPHONE ENCOUNTER
----- Message from Estuardo Bryan sent at 7/12/2019  8:07 AM EDT -----  Contact: [phone]  Pt's daughter called to see if Mr. Stone could come in to see Dr. Powell today - states he has been having a lot of BM's, she woke this morning to pt moaning/groaning, his bottom is very sore.  Please advise and I can call daughter back.

## 2019-07-12 NOTE — TELEPHONE ENCOUNTER
Returned call to patient's daughter and asked her if he had taken any Lomotil and antidiarrhea. Patient's daughter stated patient hadn't taken any antidiarrhea medications. Informed patient's daughter that she needed to get him some and go ahead and give first dose this am. Nurse also informed patient's daughter to get Desitin and preparation H ointment that she can apply to his bottom to help calm the sore and irritation down. Also informing patient's daughter to discuss with patient to see if he needs IV fluids where he's had diarrhea. Patient's daughter stated she could increase his fluid intake and also give him gatoraide. Patient's daughter stated she would discuss with him and return call to let nurse know if he wants to be scheduled.

## 2019-07-18 NOTE — TELEPHONE ENCOUNTER
Pt states that at previous IV site from two infusions ago, he had arm swelling and pain after an IV was removed. He was told to ice it and did so for a couple days. Now he states that he has had pain for last 4-5 days. No other symptoms such as redness at the site, arm swelling, fever, or discoloration of the skin. Discussed with BENI Castellanos, and advised pt to take over the counter tylenol for pain and try icing arm again. If pain does not improve overnight, encouraged to call back 7/19/19 morning. Pt verbalizes understanding and told to call back with any other concerns or questions as well.

## 2019-07-22 NOTE — PROGRESS NOTES
Malignant tumor of prostate (CMS/HCC)    12/13/2017 Initial Diagnosis     Prostate cancer metastatic to intrapelvic lymph node          - 1/30/2018 Radiation     Radiation OncologyTreatment Course:  Sadi Stone Jr. received 45 cGy in 18 fractions with Dr. Rosas at [facility]         1/30/2018 -  Hormonal Therapy     Received Eliguard every 6 months         5/7/2018 -  Chemotherapy     1. Taxotere X 6 cycles May- Aug 2018  2. Zytiga with Prednisone Oct 2018 - Jan 2019  3. Xtandi Jan 2019 - May 2019  4. Taxotere May - June 2019 - progression  5.  Jevtana July 2019 -            REASON FOR VISIT: Metastatic Prostate Cancer    HISTORY OF PRESENT ILLNESS:   64 y.o.  male presents today for Follow-up of his locally advanced prostate cancer.  He completed 6 cycles of Taxotere in aug 2018.  After which I had placed him on Zytiga.  He progressed in January 2019.  Subsequently was switched to Xtandi which again shows progression  in April 2019.  He became more symptomatic with more abdominal pain and poor appetite.  Reinitiated Taxotere in April 2019.  However had progression with couple of cycles.  He was started on Jevtana in July 2019.    He presents after 1 cycle of treatment.  Clinically seems to be doing reasonably well.  He had some diarrhea related to this treatment.  Still has fair bit of abdominal discomfort and lower extremity swelling.    Past medical history, social history and family history was reviewed and unchanged from prior visit.    Review of Systems:    Review of Systems   Constitutional: Positive for appetite change and fatigue.   HENT:  Negative.    Eyes: Negative.    Respiratory: Negative.    Cardiovascular: Positive for leg swelling.   Gastrointestinal: Positive for abdominal pain.   Endocrine: Negative.    Genitourinary: Negative.     Musculoskeletal: Negative.    Skin: Negative.    Neurological: Negative.    Hematological: Negative.    Psychiatric/Behavioral: Positive for sleep  "disturbance.      A comprehensive 14 point review of systems was performed and was negative except as mentioned.      Medications:  The current medication list was reviewed in the EMR    ALLERGIES:    Allergies   Allergen Reactions   • Bactrim [Sulfamethoxazole-Trimethoprim] Anaphylaxis         Physical Exam    VITAL SIGNS:  /76 Comment: LUE  Pulse 99   Temp 98 °F (36.7 °C) (Temporal)   Resp 20   Ht 172.7 cm (68\")   Wt 70.3 kg (155 lb)   SpO2 98% Comment: RA  BMI 23.57 kg/m²     Wt Readings from Last 3 Encounters:   07/22/19 70.3 kg (155 lb)   07/12/19 65.8 kg (145 lb)   07/01/19 67.1 kg (148 lb)        Performance Status: 0    General: well appearing, in no acute distress  HEENT: sclera anicteric, oropharynx clear, neck is supple  Lymphatics: no cervical, supraclavicular, or axillary adenopathy  Cardiovascular: regular rate and rhythm, no murmurs, rubs or gallops  Lungs: clear to auscultation bilaterally  Abdomen: soft, nontender, nondistended.  No palpable organomegaly  Extremities: + Left  lower extremity edema  Skin: no rashes, lesions, bruising, or petechiae  Msk:  Shows no weakness of the large muscle groups  Psych: Mood is stable    Lab Results   Component Value Date    HGB 9.2 (L) 07/22/2019    HCT 28.1 (L) 07/22/2019    MCV 96.9 07/22/2019     07/22/2019    WBC 17.40 (H) 07/22/2019    NEUTROABS 14.30 (H) 07/22/2019    LYMPHSABS 2.30 07/22/2019    MONOSABS 0.70 07/22/2019    EOSABS 0.04 10/12/2018    BASOSABS 0.03 10/12/2018     Lab Results   Component Value Date    GLUCOSE 156 (H) 07/22/2019    BUN 22 07/22/2019    CREATININE 1.11 07/22/2019     (L) 07/22/2019    K 4.4 07/22/2019     07/22/2019    CO2 21.0 (L) 07/22/2019    CALCIUM 8.3 (L) 07/22/2019    PROTEINTOT 5.4 (L) 07/22/2019    ALBUMIN 2.50 (L) 07/22/2019    BILITOT 0.4 07/22/2019    ALKPHOS 81 07/22/2019    AST 32 07/22/2019    ALT 15 07/22/2019       Lab Results   Component Value Date    PSA 23.800 (H) 07/22/2019    " PSA 23.500 (H) 07/22/2019    PSA 12.900 (H) 07/01/2019    PSA 10.300 (H) 06/24/2019    PSA 7.970 (H) 06/10/2019       Ct Abdomen Pelvis Without Contrast    Result Date: 4/22/2019  Progression of disease with increase seen in size of the retroperitoneal adenopathy. The lesion within the liver is also increased in size. The bony metastatic disease appears to be stable. Imaging of the chest reveals several noncalcified nodules suggesting possible old healed granulomatous disease. Continued follow-up recommended.  D:  04/22/2019 E:  04/22/2019  This report was finalized on 4/22/2019 5:30 PM by Dr. Indy Whitlock MD.      Ct Chest Without Contrast    Result Date: 4/22/2019  Progression of disease with increase seen in size of the retroperitoneal adenopathy. The lesion within the liver is also increased in size. The bony metastatic disease appears to be stable. Imaging of the chest reveals several noncalcified nodules suggesting possible old healed granulomatous disease. Continued follow-up recommended.  D:  04/22/2019 E:  04/22/2019  This report was finalized on 4/22/2019 5:30 PM by Dr. Indy Whitlock MD.      Nm Bone Scan Whole Body    Result Date: 4/23/2019  Stable subtle uptake of tracer again seen within the L1 vertebral body level. No evidence of progression of disease. There is also slight delay in visualization of the renal pelvis of the right kidney suggesting possible renal insufficiency.  D:  04/23/2019 E:  04/23/2019    This report was finalized on 4/23/2019 3:46 PM by Dr. Indy Whitlock MD.      Ct Abdomen Pelvis With Contrast    Result Date: 6/21/2019  1. Progressive hepatic metastasis and retroperitoneal adenopathy from prior comparison along with mild body wall edema. 2. Lytic area within the L1 vertebral body again noted.  D:  06/20/2019 E:  06/20/2019  This report was finalized on 6/21/2019 4:05 PM by Dr. Josh Peace.            Assessment/Plan    1.  Locally advanced metastatic prostate  cancer: I am continuing cycle #2 of Jevtana with no dose reduction.  I am concerned that he has progressive disease in spite of treatment.  Clinically he does not seem better with 1 cycle.  I spoke to him about options going forward including consideration for clinical trial.  This would involve travel.  I am not sure he is completely interested in that.  If he fails Jevtana then we would consider hospice as a next choice going forward.    2.  Insomnia: on restoril    3.  Left lower extremity edema secondary to lymphadenopathy - continue supportive care for now.     4.  Anorexia.  No significant improvement.    5.  Fatigue related to malignancy and chemotherapy.  on Ritalin twice a day to see if it improves.    6.  Consented him for strata trial which looks at next generation sequencing.    I spent a total of 25 minutes in direct patient care, greater than 20 minutes (greater than 50%) were spent in coordination of care, and counseling the patient regarding  metastatic prostate cancer . Answered any questions patient had with medication and plan.        Jolnyn Dao MD  [facility]    7/22/2019     Return on: 08/12/19  Return in (Approximately): Schedule with next infusion, 3 months    Orders Placed This Encounter   Procedures   • PSA Diagnostic     Standing Status:   Future     Number of Occurrences:   1     Standing Expiration Date:   7/21/2020   • Comprehensive metabolic panel     Standing Status:   Future     Number of Occurrences:   1     Standing Expiration Date:   7/21/2020   • PSA Diagnostic     Standing Status:   Future     Standing Expiration Date:   8/11/2020   • Comprehensive metabolic panel     Standing Status:   Future     Standing Expiration Date:   8/11/2020   • CBC and Differential     Standing Status:   Future     Standing Expiration Date:   7/21/2020     Order Specific Question:   Manual Differential     Answer:   No   • CBC and Differential     Standing Status:    Future     Standing Expiration Date:   8/11/2020     Order Specific Question:   Manual Differential     Answer:   No         Please note that portions of this note may have been completed with a voice recognition program. Efforts were made to edit the dictations, but occasionally words are mistranscribed.

## 2019-08-12 NOTE — PROGRESS NOTES
Malignant tumor of prostate (CMS/HCC)    12/13/2017 Initial Diagnosis     Prostate cancer metastatic to intrapelvic lymph node          - 1/30/2018 Radiation     Radiation OncologyTreatment Course:  Sadi Stone Jr. received 45 cGy in 18 fractions with Dr. Rosas at [facility]         1/30/2018 -  Hormonal Therapy     Received Eliguard every 6 months         5/7/2018 -  Chemotherapy     1. Taxotere X 6 cycles May- Aug 2018  2. Zytiga with Prednisone Oct 2018 - Jan 2019  3. Xtandi Jan 2019 - May 2019  4. Taxotere May - June 2019 - progression  5.  Jevtana July 2019 -            REASON FOR VISIT: Metastatic Prostate Cancer    HISTORY OF PRESENT ILLNESS:   64 y.o.  male presents today for Follow-up of his locally advanced prostate cancer.  He completed 6 cycles of Taxotere in aug 2018.  After which I had placed him on Zytiga.  He progressed in January 2019.  Subsequently was switched to Xtandi which again shows progression  in April 2019.  He became more symptomatic with more abdominal pain and poor appetite.  Reinitiated Taxotere in April 2019.  However had progression with couple of cycles.  He was started on Jevtana in July 2019.    He presents after 2 cycles of treatment.  Clinically seems to be doing reasonably well.  He continues have some diarrhea.  He also is getting weaker.    Past medical history, social history and family history was reviewed and unchanged from prior visit.    Review of Systems:    Review of Systems   Constitutional: Positive for appetite change and fatigue.   HENT:  Negative.    Eyes: Negative.    Respiratory: Negative.    Cardiovascular: Positive for leg swelling.   Gastrointestinal: Positive for abdominal pain.   Endocrine: Negative.    Genitourinary: Negative.     Musculoskeletal: Negative.    Skin: Negative.    Neurological: Negative.    Hematological: Negative.    Psychiatric/Behavioral: Positive for sleep disturbance.      A comprehensive 14 point review of systems  "was performed and was negative except as mentioned.      Medications:  The current medication list was reviewed in the EMR    ALLERGIES:    Allergies   Allergen Reactions   • Bactrim [Sulfamethoxazole-Trimethoprim] Anaphylaxis         Physical Exam    VITAL SIGNS:  /71 Comment: LUE  Pulse 94   Temp 98.1 °F (36.7 °C) (Temporal)   Resp 20   Ht 172.7 cm (68\")   Wt 69.4 kg (153 lb)   SpO2 100% Comment: RA  BMI 23.26 kg/m²     Wt Readings from Last 3 Encounters:   08/12/19 69.4 kg (153 lb)   07/22/19 70.3 kg (155 lb)   07/12/19 65.8 kg (145 lb)        Performance Status: 0    General: well appearing, in no acute distress  HEENT: sclera anicteric, oropharynx clear, neck is supple  Lymphatics: no cervical, supraclavicular, or axillary adenopathy  Cardiovascular: regular rate and rhythm, no murmurs, rubs or gallops  Lungs: clear to auscultation bilaterally  Abdomen: soft, nontender, nondistended.  No palpable organomegaly  Extremities: + Left  lower extremity edema  Skin: no rashes, lesions, bruising, or petechiae  Msk:  Shows no weakness of the large muscle groups  Psych: Mood is stable    Lab Results   Component Value Date    HGB 9.5 (L) 08/12/2019    HCT 29.6 (L) 08/12/2019    .6 (H) 08/12/2019     08/12/2019    WBC 15.80 (H) 08/12/2019    NEUTROABS 11.00 (H) 08/12/2019    LYMPHSABS 3.70 (H) 08/12/2019    MONOSABS 1.10 (H) 08/12/2019    EOSABS 0.04 10/12/2018    BASOSABS 0.03 10/12/2018     Lab Results   Component Value Date    GLUCOSE 125 (H) 08/12/2019    BUN 23 08/12/2019    CREATININE 1.46 (H) 08/12/2019     08/12/2019    K 4.3 08/12/2019     08/12/2019    CO2 21.0 (L) 08/12/2019    CALCIUM 8.4 (L) 08/12/2019    PROTEINTOT 5.0 (L) 08/12/2019    ALBUMIN 2.60 (L) 08/12/2019    BILITOT 0.4 08/12/2019    ALKPHOS 94 08/12/2019    AST 41 (H) 08/12/2019    ALT 18 08/12/2019       Lab Results   Component Value Date    PSA 38.500 (H) 08/12/2019    PSA 23.800 (H) 07/22/2019    PSA 23.500 " (H) 07/22/2019    PSA 12.900 (H) 07/01/2019    PSA 10.300 (H) 06/24/2019       Ct Abdomen Pelvis Without Contrast    Result Date: 4/22/2019  Progression of disease with increase seen in size of the retroperitoneal adenopathy. The lesion within the liver is also increased in size. The bony metastatic disease appears to be stable. Imaging of the chest reveals several noncalcified nodules suggesting possible old healed granulomatous disease. Continued follow-up recommended.  D:  04/22/2019 E:  04/22/2019  This report was finalized on 4/22/2019 5:30 PM by Dr. Indy Whitlock MD.      Ct Chest Without Contrast    Result Date: 4/22/2019  Progression of disease with increase seen in size of the retroperitoneal adenopathy. The lesion within the liver is also increased in size. The bony metastatic disease appears to be stable. Imaging of the chest reveals several noncalcified nodules suggesting possible old healed granulomatous disease. Continued follow-up recommended.  D:  04/22/2019 E:  04/22/2019  This report was finalized on 4/22/2019 5:30 PM by Dr. Indy Whitlock MD.      Nm Bone Scan Whole Body    Result Date: 4/23/2019  Stable subtle uptake of tracer again seen within the L1 vertebral body level. No evidence of progression of disease. There is also slight delay in visualization of the renal pelvis of the right kidney suggesting possible renal insufficiency.  D:  04/23/2019 E:  04/23/2019    This report was finalized on 4/23/2019 3:46 PM by Dr. Indy Whitlock MD.      Ct Abdomen Pelvis With Contrast    Result Date: 6/21/2019  1. Progressive hepatic metastasis and retroperitoneal adenopathy from prior comparison along with mild body wall edema. 2. Lytic area within the L1 vertebral body again noted.  D:  06/20/2019 E:  06/20/2019  This report was finalized on 6/21/2019 4:05 PM by Dr. Josh Peace.            Assessment/Plan    1.  Locally advanced metastatic prostate cancer: I am continuing cycle #3 of  Jevtana with no dose reduction.  Clinically he does not seem to have improved any.  He definitely does not have any worsening disease.  My plan is to CAT scan his chest abdomen pelvis prior to his next cycle of treatment.  This will allow us to know if he has clear progression or not.  After 3 cycles one would think that he would have some response to treatment.  His PSA has slowly increased over the period of time.    2.  Insomnia: on restoril    3.  Left lower extremity edema secondary to lymphadenopathy - continue supportive care for now.     4.  Anorexia.  No significant improvement.    5.  Fatigue related to malignancy and chemotherapy.  on Ritalin twice a day to see if it improves.    6.  Consented him for strata trial which looks at next generation sequencing.    I spent a total of 25 minutes in direct patient care, greater than 20 minutes (greater than 50%) were spent in coordination of care, and counseling the patient regarding  metastatic prostate cancer . Answered any questions patient had with medication and plan.        Jolynn Dao MD  [facility] Hematology and Oncology    8/12/2019     Return on: 09/02/19  Return in (Approximately): 3 weeks, Schedule with next infusion    Orders Placed This Encounter   Procedures   • CBC Auto Differential         Please note that portions of this note may have been completed with a voice recognition program. Efforts were made to edit the dictations, but occasionally words are mistranscribed.

## 2019-08-14 NOTE — TELEPHONE ENCOUNTER
----- Message from Indy Westbrook RN sent at 8/14/2019  2:55 PM EDT -----  Regarding: Pain Medication   Contact: [phone]  Patient called triage line stating that the new medication Dr. Powell put him on for pain was not working. After speaking with the patient he explained that he is not experiencing new pain, but that he tried the Norco that Andre put him on and he is getting no relief. He states he has three pills left of Oxycodone that was prescribed to him back in February.

## 2019-08-14 NOTE — TELEPHONE ENCOUNTER
Ok per Dr. Powell to switch rx back to oxycodone 10 mg 1 tab PO q6 hrs prn.  Patient notified.  Rx sent.

## 2019-08-22 NOTE — TELEPHONE ENCOUNTER
Called patient to inform him that we are able to get him in at 11:00 for a liter of fluids and labs. Patient stated he understood.

## 2019-08-22 NOTE — TELEPHONE ENCOUNTER
----- Message from Leslie Cross sent at 8/22/2019  1:41 PM EDT -----  Regarding: YASMINE-FLUIDS  Contact: [phone]  Shaw patient's daughter called he is very weak, and she thinks he may needs some fluids. Could this be setup for tomorrow around 10:30? Please call .

## 2019-08-23 NOTE — TELEPHONE ENCOUNTER
----- Message from Radha Herrera Rep sent at 8/23/2019 11:29 AM EDT -----  Regarding: YASMINE- INFUSION NOW   Contact: [phone]  PT IS INFUSION RIGHT NOW SENT DAUGHTER UP TO CLINIC DESK TO GET STOMACH MEDS. PT STOMACH IS HURTING IN A LOT OF PAIN DAUGHTER EXPLAINS PT IS IN CHAIR 6 IN INFUSION

## 2019-08-23 NOTE — TELEPHONE ENCOUNTER
Went to infusion department and discussed symptoms with patient. Patient reports that pain will come and go and eating doesn't relieve it. Informed patient that nurse will discuss with Dr. Powell and then inform patient what Dr. Powell says.    Discussed symptoms with Dr. Powell and went to inform patient that Dr. Powell wants patient to take his pain medication for the pain. Patient sitting in chair eating lunch and reports that that is what he though Dr. Powell would want him to do.

## 2019-09-04 NOTE — PROGRESS NOTES
Malignant tumor of prostate (CMS/HCC)    12/13/2017 Initial Diagnosis     Prostate cancer metastatic to intrapelvic lymph node          - 1/30/2018 Radiation     Radiation OncologyTreatment Course:  Sadi Stone Jr. received 45 cGy in 18 fractions with Dr. Rosas at [facility]         1/30/2018 -  Hormonal Therapy     Received Eliguard every 6 months         5/7/2018 -  Chemotherapy     1. Taxotere X 6 cycles May- Aug 2018  2. Zytiga with Prednisone Oct 2018 - Jan 2019  3. Xtandi Jan 2019 - May 2019  4. Taxotere May - June 2019 - progression  5.  Jevtana July 2019 -            REASON FOR VISIT: Metastatic Prostate Cancer    HISTORY OF PRESENT ILLNESS:   64 y.o.  male presents today for Follow-up of his locally advanced prostate cancer.  He completed 6 cycles of Taxotere in aug 2018.  After which I had placed him on Zytiga.  He progressed in January 2019.  Subsequently was switched to Xtandi which again shows progression  in April 2019.  He became more symptomatic with more abdominal pain and poor appetite.  Reinitiated Taxotere in April 2019.  However had progression with couple of cycles.  He was started on Jevtana in July 2019.    He presents today with worsening symptoms.  He had CAT scans done prior to this visit.  Clinically he is doing reasonably well.  Pain seems relatively controlled.      Past medical history, social history and family history was reviewed and unchanged from prior visit.    Review of Systems:    Review of Systems   Constitutional: Positive for appetite change and fatigue.   HENT:  Negative.    Eyes: Negative.    Respiratory: Negative.    Cardiovascular: Positive for leg swelling.   Gastrointestinal: Positive for abdominal pain.   Endocrine: Negative.    Genitourinary: Negative.     Musculoskeletal: Negative.    Skin: Negative.    Neurological: Negative.    Hematological: Negative.    Psychiatric/Behavioral: Positive for sleep disturbance.      A comprehensive 14 point  "review of systems was performed and was negative except as mentioned.      Medications:  The current medication list was reviewed in the EMR    ALLERGIES:    Allergies   Allergen Reactions   • Bactrim [Sulfamethoxazole-Trimethoprim] Anaphylaxis         Physical Exam    VITAL SIGNS:  /88 Comment: LUE  Pulse 84   Temp 97.7 °F (36.5 °C) (Temporal)   Resp 20   Ht 172.7 cm (68\")   Wt 72.1 kg (159 lb)   SpO2 99% Comment: RA  BMI 24.18 kg/m²     Wt Readings from Last 3 Encounters:   09/04/19 72.1 kg (159 lb)   08/23/19 66.2 kg (146 lb)   08/12/19 69.4 kg (153 lb)        Performance Status: 1    General: well appearing, in no acute distress  HEENT: sclera anicteric, oropharynx clear, neck is supple  Lymphatics: no cervical, supraclavicular, or axillary adenopathy  Cardiovascular: regular rate and rhythm, no murmurs, rubs or gallops  Lungs: clear to auscultation bilaterally  Abdomen: soft, nontender, nondistended.  No palpable organomegaly  Extremities: + Left  lower extremity edema  Skin: no rashes, lesions, bruising, or petechiae  Msk:  Shows no weakness of the large muscle groups  Psych: Mood is stable    Lab Results   Component Value Date    HGB 8.7 (L) 08/23/2019    HCT 27.5 (L) 08/23/2019    MCV 98.8 (H) 08/23/2019     08/23/2019    WBC 3.10 (L) 08/23/2019    NEUTROABS 1.20 (L) 08/23/2019    LYMPHSABS 1.70 08/23/2019    MONOSABS 0.20 08/23/2019    EOSABS 0.04 10/12/2018    BASOSABS 0.03 10/12/2018     Lab Results   Component Value Date    GLUCOSE 125 (H) 08/12/2019    BUN 23 08/12/2019    CREATININE 1.30 08/12/2019     08/12/2019    K 4.3 08/12/2019     08/12/2019    CO2 21.0 (L) 08/12/2019    CALCIUM 8.4 (L) 08/12/2019    PROTEINTOT 5.0 (L) 08/12/2019    ALBUMIN 2.60 (L) 08/12/2019    BILITOT 0.4 08/12/2019    ALKPHOS 94 08/12/2019    AST 41 (H) 08/12/2019    ALT 18 08/12/2019       Lab Results   Component Value Date    PSA 38.500 (H) 08/12/2019    PSA 23.800 (H) 07/22/2019    PSA " 23.500 (H) 07/22/2019    PSA 12.900 (H) 07/01/2019    PSA 10.300 (H) 06/24/2019       Ct Abdomen Pelvis With Contrast    Result Date: 8/30/2019  1. There has been significant progression of disease in the liver with numerous metastatic lesions which are larger and of greater number than on the previous examination of 06/20/2019. There is also a new small right pleural effusion. 2. There is bulky retroperitoneal lymphadenopathy which is unchanged. There is also a lytic lesion at L1 which is unchanged.  D:  08/30/2019 E:  08/30/2019  This report was finalized on 8/30/2019 11:50 AM by Dr. Tung Moran MD.      Ct Abdomen Pelvis With Contrast    Result Date: 6/21/2019  1. Progressive hepatic metastasis and retroperitoneal adenopathy from prior comparison along with mild body wall edema. 2. Lytic area within the L1 vertebral body again noted.  D:  06/20/2019 E:  06/20/2019  This report was finalized on 6/21/2019 4:05 PM by Dr. Josh Peace.            Assessment/Plan    1.  Locally advanced metastatic prostate cancer: I reviewed his scans with him and his wife today.  He has clear progressive disease in the liver and abdomen.  At this point standard of care chemotherapy would have little or no effect on him.  His options include hospice care versus considering a clinical trial.  Unfortunately he would need to travel if he decides on a clinical trial.  He prefers hospice for now.  We will contact hospice to meet him at home to discuss this services and he can decide if he wants to pursue that.  I will continue to manage his palliative needs until hospice takes over his care.  I was able to answer all the questions that they had for me.        I spent a total of 25 minutes in direct patient care, greater than 20 minutes (greater than 50%) were spent in coordination of care, and counseling the patient regarding  metastatic prostate cancer . Answered any questions patient had with medication and plan.        Jolynn  MD Sylwia  [facility] Hematology and Oncology    9/4/2019     Return in (Approximately): PRN    Orders Placed This Encounter   Procedures   • Ambulatory Referral to Home Hospice     Referral Priority:   Routine     Referral Type:   Hospice     Referral Reason:   Specialty Services Required     Requested Specialty:   Hospice Services     Number of Visits Requested:   1         Please note that portions of this note may have been completed with a voice recognition program. Efforts were made to edit the dictations, but occasionally words are mistranscribed.

## 2021-08-18 NOTE — TELEPHONE ENCOUNTER
Called patient and spoke to wife and informed her that medication refill was sent in. Patient's wife stated she understood.   Complex Repair And Rhombic Flap Text: The defect edges were debeveled with a #15 scalpel blade.  The primary defect was closed partially with a complex linear closure.  Given the location of the remaining defect, shape of the defect and the proximity to free margins a rhombic flap was deemed most appropriate for complete closure of the defect.  Using a sterile surgical marker, an appropriate advancement flap was drawn incorporating the defect and placing the expected incisions within the relaxed skin tension lines where possible.    The area thus outlined was incised deep to adipose tissue with a #15 scalpel blade.  The skin margins were undermined to an appropriate distance in all directions utilizing iris scissors.

## 2021-12-13 NOTE — TELEPHONE ENCOUNTER
----- Message from Leslie Cross sent at 6/26/2018  3:24 PM EDT -----  Regarding: ANIA  Contact: [phone]  Shaw patient's daughter called he has been having stomach issues everytime he eats it hurts his stomach, having diarrhea, nauseated, and he was told to take a Melatonin and its not working for sleep. Please call him on cell number above.  
12/13/2021    Patient: Sim Morning   YOB: 1990   Date of Visit: 12/13/2021     Jolene Maldonado MD  [address] [place] [address]    Dear Jolene Maldonado MD,      Thank you for referring Ms. Teresita Milner to [facility] for evaluation. My notes for this consultation are attached. If you have questions, please do not hesitate to call me. I look forward to following your patient along with you.       Sincerely,    Jay Marrero MD
Returned call and talked to patient.  Patient reports that when he eats his mouth burns and hurts.  Discussed all the side effects patient is having with Dr Powell.   Got a verbal order for lidocaine viscous and diflucan for mouth soreness, and educated taking imoduim every 2 hours till not loose stool for 12 hrs, and regland for nausea after eating.  Patient educated and verbalized understanding.    
Normal for race

## 2023-10-09 NOTE — PROGRESS NOTES
Chief Complaint   Patient presents with   • Leg Swelling       Subjective   Sadi Stone is a 62 y.o. male    History of Present Illness  Pt still with swelling of left lower extremity, Saw his Urologist and they recommended ureteral stent placement. Pt with concerns and questions about possible stent placement. Pt also with urination almost on the hour. PT has an oncologist.  Amine    Allergies   Allergen Reactions   • Bactrim [Sulfamethoxazole-Trimethoprim] Anaphylaxis     Past Medical History:   Diagnosis Date   • Arthritis    • Glaucoma    • Peptic ulceration       Past Surgical History:   Procedure Laterality Date   • KIDNEY SURGERY Left     removed a spot    • KNEE SURGERY     • SHOULDER SURGERY       Social History     Social History   • Marital status:      Spouse name: N/A   • Number of children: N/A   • Years of education: N/A     Occupational History   • Not on file.     Social History Main Topics   • Smoking status: Never Smoker   • Smokeless tobacco: Never Used   • Alcohol use No   • Drug use: No   • Sexual activity: Not Currently     Other Topics Concern   • Not on file     Social History Narrative   • No narrative on file        Current Outpatient Prescriptions:   •  ALPHAGAN P 0.1 % solution ophthalmic solution, , Disp: , Rfl:   •  aspirin 81 MG EC tablet, Take 81 mg by mouth Daily., Disp: , Rfl:   •  diphenhydrAMINE (BENADRYL) 25 mg capsule, Take 1 capsule by mouth Every 6 (Six) Hours As Needed for Itching (qhs for itching)., Disp: 30 capsule, Rfl: 0  •  dutasteride (AVODART) 0.5 MG capsule, Take 1 capsule by mouth Daily., Disp: 30 capsule, Rfl: 11  •  loratadine (CLARITIN) 10 MG tablet, Take 1 tablet by mouth Every Morning., Disp: 30 tablet, Rfl: 0  •  Multiple Vitamins-Minerals (MULTIVITAMIN ADULT PO), Take  by mouth., Disp: , Rfl:   •  omeprazole (priLOSEC) 40 MG capsule, Take 1 capsule by mouth Daily., Disp: 30 capsule, Rfl: 2  •  tamsulosin (FLOMAX) 0.4 MG capsule 24 hr capsule,  Take 1 capsule by mouth Every Night., Disp: 30 capsule, Rfl: 11     Review of Systems   Constitutional: Negative for appetite change, chills and fever.        Night sweats   Respiratory: Negative for cough, shortness of breath and wheezing.    Cardiovascular: Negative for chest pain.   Gastrointestinal: Negative for constipation, diarrhea, nausea and vomiting.   Genitourinary: Positive for difficulty urinating and frequency. Negative for dysuria.   Psychiatric/Behavioral: Positive for sleep disturbance (nocturia).       Objective     Vitals:    03/14/18 0836   BP: 144/98   Pulse: 75   SpO2: 98%       Results for orders placed or performed in visit on 12/13/17   POCT Influenza A/B   Result Value Ref Range    Rapid Influenza A Ag NEGATIVE     Rapid Influenza B Ag NEGATIVE     Internal Control Passed Passed    Lot Number [id number]     Expiration Date 02/01/2019        Physical Exam   Constitutional: He is oriented to person, place, and time. He appears well-developed and well-nourished.   Cardiovascular: Normal rate, regular rhythm and normal heart sounds.    Pulmonary/Chest: Effort normal and breath sounds normal.   Musculoskeletal: He exhibits edema (left leg).   Neurological: He is alert and oriented to person, place, and time.   Skin: Skin is warm and dry.   Psychiatric: He has a normal mood and affect. His behavior is normal.   Vitals reviewed.      Assessment/Plan   Problem List Items Addressed This Visit        Immune and Lymphatic    Prostate cancer metastatic to intrapelvic lymph node - Primary       Genitourinary    Hydronephrosis of left kidney       Other    Elevated PSA measurement      Other Visit Diagnoses     Benign prostatic hyperplasia with nocturia        Relevant Medications    dutasteride (AVODART) 0.5 MG capsule    tamsulosin (FLOMAX) 0.4 MG capsule 24 hr capsule      For Prostate CA and Hydronephrosis- discussed with PT need for intervention for left ureter and PT given copies of latest CTs to take  to his Cancer MD. Discussed importance of this PT getting the needed intervention in a timely manner with possible death being an outcome if he does not have procedures. PT verbalized understanding.   If not addressed by current Urology or cancer MD. Pt is to let me know for possible refer for 2nd opinion.     Counseling provided on      .    Jh Danielle, APRN   3/14/2018   9:06 AM         This patient has been assessed with a concern for Malnutrition and was treated during this hospitalization for the following Nutrition diagnosis/diagnoses:     -  10/06/2023: Severe protein-calorie malnutrition   -  10/06/2023: Underweight (BMI < 19)